# Patient Record
Sex: MALE | Race: WHITE | NOT HISPANIC OR LATINO | Employment: OTHER | URBAN - NONMETROPOLITAN AREA
[De-identification: names, ages, dates, MRNs, and addresses within clinical notes are randomized per-mention and may not be internally consistent; named-entity substitution may affect disease eponyms.]

---

## 2017-01-31 RX ORDER — SPIRONOLACTONE 50 MG/1
TABLET, FILM COATED ORAL
Qty: 30 TABLET | Refills: 0 | Status: SHIPPED | OUTPATIENT
Start: 2017-01-31

## 2017-02-02 ENCOUNTER — APPOINTMENT (OUTPATIENT)
Dept: GENERAL RADIOLOGY | Facility: HOSPITAL | Age: 52
End: 2017-02-02

## 2017-02-02 ENCOUNTER — APPOINTMENT (OUTPATIENT)
Dept: ULTRASOUND IMAGING | Facility: HOSPITAL | Age: 52
End: 2017-02-02

## 2017-02-02 ENCOUNTER — HOSPITAL ENCOUNTER (INPATIENT)
Facility: HOSPITAL | Age: 52
LOS: 5 days | Discharge: HOME | End: 2017-02-07
Attending: INTERNAL MEDICINE | Admitting: FAMILY MEDICINE

## 2017-02-02 DIAGNOSIS — I50.20 SYSTOLIC CONGESTIVE HEART FAILURE, UNSPECIFIED CONGESTIVE HEART FAILURE CHRONICITY: Primary | ICD-10-CM

## 2017-02-02 DIAGNOSIS — Z78.9 DECREASED ACTIVITIES OF DAILY LIVING (ADL): ICD-10-CM

## 2017-02-02 DIAGNOSIS — R68.89 DECREASED ACTIVITY TOLERANCE: ICD-10-CM

## 2017-02-02 LAB
ALBUMIN SERPL-MCNC: 3.6 G/DL (ref 3.5–5)
ALBUMIN/GLOB SERPL: 1.3 G/DL (ref 1.1–2.5)
ALP SERPL-CCNC: 59 U/L (ref 24–120)
ALT SERPL W P-5'-P-CCNC: 40 U/L (ref 0–54)
ANION GAP SERPL CALCULATED.3IONS-SCNC: 6 MMOL/L (ref 4–13)
APTT PPP: 42.6 SECONDS (ref 24.1–34.8)
AST SERPL-CCNC: 15 U/L (ref 7–45)
BACTERIA UR QL AUTO: ABNORMAL /HPF
BASOPHILS # BLD AUTO: 0.02 10*3/MM3 (ref 0–0.2)
BASOPHILS NFR BLD AUTO: 0.3 % (ref 0–2)
BILIRUB SERPL-MCNC: 1 MG/DL (ref 0.1–1)
BILIRUB UR QL STRIP: NEGATIVE
BUN BLD-MCNC: 26 MG/DL (ref 5–21)
BUN/CREAT SERPL: 21.8 (ref 7–25)
CALCIUM SPEC-SCNC: 8.7 MG/DL (ref 8.4–10.4)
CHLORIDE SERPL-SCNC: 98 MMOL/L (ref 98–110)
CLARITY UR: ABNORMAL
CO2 SERPL-SCNC: 30 MMOL/L (ref 24–31)
COLOR UR: YELLOW
CREAT BLD-MCNC: 1.19 MG/DL (ref 0.5–1.4)
CRP SERPL-MCNC: <0.5 MG/DL (ref 0–0.99)
D DIMER PPP FEU-MCNC: 0.45 MG/L (FEU) (ref 0–0.5)
DEPRECATED RDW RBC AUTO: 64 FL (ref 40–54)
DIGOXIN SERPL-MCNC: 1.4 NG/ML (ref 0.8–2)
EOSINOPHIL # BLD AUTO: 0.07 10*3/MM3 (ref 0–0.7)
EOSINOPHIL NFR BLD AUTO: 1.1 % (ref 0–4)
ERYTHROCYTE [DISTWIDTH] IN BLOOD BY AUTOMATED COUNT: 16.8 % (ref 12–15)
GFR SERPL CREATININE-BSD FRML MDRD: 64 ML/MIN/1.73
GLOBULIN UR ELPH-MCNC: 2.7 GM/DL
GLUCOSE BLD-MCNC: 150 MG/DL (ref 70–100)
GLUCOSE UR STRIP-MCNC: NEGATIVE MG/DL
HCT VFR BLD AUTO: 52.3 % (ref 40–52)
HGB BLD-MCNC: 16.6 G/DL (ref 14–18)
HGB UR QL STRIP.AUTO: ABNORMAL
HYALINE CASTS UR QL AUTO: ABNORMAL /LPF
IMM GRANULOCYTES # BLD: 0.03 10*3/MM3 (ref 0–0.03)
IMM GRANULOCYTES NFR BLD: 0.5 % (ref 0–5)
INR PPP: 2.34 (ref 0.91–1.09)
KETONES UR QL STRIP: NEGATIVE
LEUKOCYTE ESTERASE UR QL STRIP.AUTO: ABNORMAL
LYMPHOCYTES # BLD AUTO: 1.38 10*3/MM3 (ref 0.72–4.86)
LYMPHOCYTES NFR BLD AUTO: 22.5 % (ref 15–45)
MCH RBC QN AUTO: 33.3 PG (ref 28–32)
MCHC RBC AUTO-ENTMCNC: 31.7 G/DL (ref 33–36)
MCV RBC AUTO: 104.8 FL (ref 82–95)
MONOCYTES # BLD AUTO: 0.65 10*3/MM3 (ref 0.19–1.3)
MONOCYTES NFR BLD AUTO: 10.6 % (ref 4–12)
NEUTROPHILS # BLD AUTO: 3.98 10*3/MM3 (ref 1.87–8.4)
NEUTROPHILS NFR BLD AUTO: 65 % (ref 39–78)
NITRITE UR QL STRIP: NEGATIVE
NT-PROBNP SERPL-MCNC: 5350 PG/ML (ref 0–900)
PH UR STRIP.AUTO: 5.5 [PH] (ref 5–8)
PLATELET # BLD AUTO: 120 10*3/MM3 (ref 130–400)
PMV BLD AUTO: 12.9 FL (ref 6–12)
POTASSIUM BLD-SCNC: 3.9 MMOL/L (ref 3.5–5.3)
PROT SERPL-MCNC: 6.3 G/DL (ref 6.3–8.7)
PROT UR QL STRIP: ABNORMAL
PROTHROMBIN TIME: 26.5 SECONDS (ref 11.9–14.6)
RBC # BLD AUTO: 4.99 10*6/MM3 (ref 4.8–5.9)
RBC # UR: ABNORMAL /HPF
REF LAB TEST METHOD: ABNORMAL
SODIUM BLD-SCNC: 134 MMOL/L (ref 135–145)
SP GR UR STRIP: 1.01 (ref 1–1.03)
SQUAMOUS #/AREA URNS HPF: ABNORMAL /HPF
TROPONIN I SERPL-MCNC: 0 NG/ML (ref 0–0.07)
UROBILINOGEN UR QL STRIP: ABNORMAL
WBC NRBC COR # BLD: 6.13 10*3/MM3 (ref 4.8–10.8)
WBC UR QL AUTO: ABNORMAL /HPF

## 2017-02-02 PROCEDURE — 80162 ASSAY OF DIGOXIN TOTAL: CPT | Performed by: NURSE PRACTITIONER

## 2017-02-02 PROCEDURE — 25010000002 CEFTRIAXONE: Performed by: NURSE PRACTITIONER

## 2017-02-02 PROCEDURE — 87086 URINE CULTURE/COLONY COUNT: CPT | Performed by: NURSE PRACTITIONER

## 2017-02-02 PROCEDURE — 25010000002 FUROSEMIDE PER 20 MG: Performed by: NURSE PRACTITIONER

## 2017-02-02 PROCEDURE — 93970 EXTREMITY STUDY: CPT

## 2017-02-02 PROCEDURE — 93970 EXTREMITY STUDY: CPT | Performed by: SURGERY

## 2017-02-02 PROCEDURE — 85610 PROTHROMBIN TIME: CPT | Performed by: NURSE PRACTITIONER

## 2017-02-02 PROCEDURE — 93010 ELECTROCARDIOGRAM REPORT: CPT | Performed by: INTERNAL MEDICINE

## 2017-02-02 PROCEDURE — 85025 COMPLETE CBC W/AUTO DIFF WBC: CPT | Performed by: NURSE PRACTITIONER

## 2017-02-02 PROCEDURE — 87040 BLOOD CULTURE FOR BACTERIA: CPT | Performed by: NURSE PRACTITIONER

## 2017-02-02 PROCEDURE — 71020 HC CHEST PA AND LATERAL: CPT

## 2017-02-02 PROCEDURE — 85379 FIBRIN DEGRADATION QUANT: CPT | Performed by: NURSE PRACTITIONER

## 2017-02-02 PROCEDURE — 86140 C-REACTIVE PROTEIN: CPT | Performed by: NURSE PRACTITIONER

## 2017-02-02 PROCEDURE — 99285 EMERGENCY DEPT VISIT HI MDM: CPT

## 2017-02-02 PROCEDURE — 80053 COMPREHEN METABOLIC PANEL: CPT | Performed by: NURSE PRACTITIONER

## 2017-02-02 PROCEDURE — 85730 THROMBOPLASTIN TIME PARTIAL: CPT | Performed by: NURSE PRACTITIONER

## 2017-02-02 PROCEDURE — 84484 ASSAY OF TROPONIN QUANT: CPT

## 2017-02-02 PROCEDURE — 93005 ELECTROCARDIOGRAM TRACING: CPT | Performed by: NURSE PRACTITIONER

## 2017-02-02 PROCEDURE — 94799 UNLISTED PULMONARY SVC/PX: CPT

## 2017-02-02 PROCEDURE — 81001 URINALYSIS AUTO W/SCOPE: CPT | Performed by: NURSE PRACTITIONER

## 2017-02-02 PROCEDURE — 83880 ASSAY OF NATRIURETIC PEPTIDE: CPT | Performed by: NURSE PRACTITIONER

## 2017-02-02 RX ORDER — SPIRONOLACTONE 50 MG/1
50 TABLET, FILM COATED ORAL DAILY
Status: DISCONTINUED | OUTPATIENT
Start: 2017-02-03 | End: 2017-02-07 | Stop reason: HOSPADM

## 2017-02-02 RX ORDER — SODIUM CHLORIDE 0.9 % (FLUSH) 0.9 %
10 SYRINGE (ML) INJECTION AS NEEDED
Status: DISCONTINUED | OUTPATIENT
Start: 2017-02-02 | End: 2017-02-07 | Stop reason: HOSPADM

## 2017-02-02 RX ORDER — ACETAZOLAMIDE 500 MG/1
500 CAPSULE, EXTENDED RELEASE ORAL 2 TIMES DAILY
Status: DISCONTINUED | OUTPATIENT
Start: 2017-02-02 | End: 2017-02-03

## 2017-02-02 RX ORDER — ONDANSETRON 2 MG/ML
4 INJECTION INTRAMUSCULAR; INTRAVENOUS EVERY 6 HOURS PRN
Status: DISCONTINUED | OUTPATIENT
Start: 2017-02-02 | End: 2017-02-07 | Stop reason: HOSPADM

## 2017-02-02 RX ORDER — BUDESONIDE AND FORMOTEROL FUMARATE DIHYDRATE 80; 4.5 UG/1; UG/1
2 AEROSOL RESPIRATORY (INHALATION)
Status: DISCONTINUED | OUTPATIENT
Start: 2017-02-02 | End: 2017-02-03

## 2017-02-02 RX ORDER — FUROSEMIDE 10 MG/ML
40 INJECTION INTRAMUSCULAR; INTRAVENOUS ONCE
Status: COMPLETED | OUTPATIENT
Start: 2017-02-02 | End: 2017-02-02

## 2017-02-02 RX ORDER — COLCHICINE 0.6 MG/1
0.6 TABLET ORAL DAILY
Status: DISCONTINUED | OUTPATIENT
Start: 2017-02-03 | End: 2017-02-07 | Stop reason: HOSPADM

## 2017-02-02 RX ORDER — SODIUM CHLORIDE 0.9 % (FLUSH) 0.9 %
1-10 SYRINGE (ML) INJECTION AS NEEDED
Status: DISCONTINUED | OUTPATIENT
Start: 2017-02-02 | End: 2017-02-07 | Stop reason: HOSPADM

## 2017-02-02 RX ORDER — DIGOXIN 125 MCG
125 TABLET ORAL
Status: DISCONTINUED | OUTPATIENT
Start: 2017-02-03 | End: 2017-02-03

## 2017-02-02 RX ORDER — FUROSEMIDE 10 MG/ML
40 INJECTION INTRAMUSCULAR; INTRAVENOUS 2 TIMES DAILY
Status: DISCONTINUED | OUTPATIENT
Start: 2017-02-02 | End: 2017-02-03

## 2017-02-02 RX ORDER — ACETAMINOPHEN 325 MG/1
650 TABLET ORAL EVERY 4 HOURS PRN
Status: DISCONTINUED | OUTPATIENT
Start: 2017-02-02 | End: 2017-02-07 | Stop reason: HOSPADM

## 2017-02-02 RX ADMIN — CEFTRIAXONE 1 G: 1 INJECTION, POWDER, FOR SOLUTION INTRAMUSCULAR; INTRAVENOUS at 18:42

## 2017-02-02 RX ADMIN — FUROSEMIDE 40 MG: 10 INJECTION, SOLUTION INTRAMUSCULAR; INTRAVENOUS at 18:21

## 2017-02-02 NOTE — ED PROVIDER NOTES
Subjective   HPI Comments: Is a 28-year-old white male who presents here today with complaint of cough and shortness of breath and lower extremity edema.  Patient states this is been occurring since Christmas.  He reports he has not followed up with primary care provider that he is currently in between doctors.  Patient does have a history of chronic atrial fibrillation for which he does follow with Dr. Box.  Patient states that he does take 20 mg of Lasix daily and reports that he has been persistently taking this as well as Aldactone.  Patient reports that he does monitor salt intake in his diet. He reports that since Christmas he has had difficulty with walking short distances from the bathroom to the living room.  He reports that he become short of breath when he takes office closed.  Patient reports he has had about a 5 pound weight gain since Christmas.  He states he does have a cough with thick white phlegm.  Patient presents ER today for further evaluation.  She denies fever or chest pain at this time.    Patient is a 51 y.o. male presenting with shortness of breath.   History provided by:  Patient   used: No    Shortness of Breath   Severity:  Mild  Onset quality:  Gradual  Duration:  6 weeks  Timing:  Constant  Progression:  Worsening  Chronicity:  New  Context: activity    Context: not animal exposure, not emotional upset, not fumes, not known allergens, not occupational exposure, not pollens, not smoke exposure, not strong odors, not URI and not weather changes    Relieved by:  Nothing  Worsened by:  Nothing  Ineffective treatments:  None tried  Associated symptoms: sputum production    Associated symptoms: no abdominal pain, no chest pain, no claudication, no cough, no diaphoresis, no ear pain, no fever, no headaches, no hemoptysis, no neck pain, no PND, no rash, no sore throat, no syncope, no swollen glands, no vomiting and no wheezing    Risk factors: obesity    Risk factors: no  recent alcohol use, no family hx of DVT, no hx of cancer, no hx of PE/DVT, no oral contraceptive use, no prolonged immobilization, no recent surgery and no tobacco use        Review of Systems   Constitutional: Negative for diaphoresis and fever.   HENT: Negative for ear pain and sore throat.    Respiratory: Positive for sputum production and shortness of breath. Negative for cough, hemoptysis and wheezing.    Cardiovascular: Negative for chest pain, claudication, syncope and PND.   Gastrointestinal: Negative for abdominal pain and vomiting.   Musculoskeletal: Negative for neck pain.   Skin: Negative for rash.   Neurological: Negative for headaches.   All other systems reviewed and are negative.      Past Medical History   Diagnosis Date   • Cardiomegaly    • CHF (NYHA class I, ACC/AHA stage B)    • DVT (deep venous thrombosis)    • S/P cardiac cath    • Severe left ventricular systolic dysfunction    • Sleep apnea        No Known Allergies    History reviewed. No pertinent past surgical history.    Family History   Problem Relation Age of Onset   • Heart disease Mother    • Heart disease Father    • Heart disease Maternal Grandmother    • Heart disease Maternal Grandfather    • Heart disease Paternal Grandmother    • Heart disease Paternal Grandfather        Social History     Social History   • Marital status: Single     Spouse name: N/A   • Number of children: N/A   • Years of education: N/A     Social History Main Topics   • Smoking status: Current Every Day Smoker     Packs/day: 0.50   • Smokeless tobacco: None   • Alcohol use No   • Drug use: No   • Sexual activity: Defer     Other Topics Concern   • None     Social History Narrative           Objective   Physical Exam   Constitutional: He is oriented to person, place, and time. He appears well-developed and well-nourished.   HENT:   Head: Normocephalic and atraumatic.   Eyes: Conjunctivae are normal. Pupils are equal, round, and reactive to light.   Neck:  Normal range of motion. Neck supple.   Cardiovascular: Normal rate, regular rhythm and normal heart sounds.    Pulmonary/Chest: Effort normal and breath sounds normal.   Abdominal: Soft. Bowel sounds are normal.   Musculoskeletal: Normal range of motion.        Legs:  Neurological: He is alert and oriented to person, place, and time.   Skin: Skin is warm and dry.   Psychiatric: He has a normal mood and affect.   Nursing note and vitals reviewed.      Procedures         ED Course  ED Course   Value Comment By Time    CXR:   Impression: Findings concerning for pulmonary edema. Alternatively,  viral etiologies could have this appearance. Correlate clinically.     Kim MICHELLE Evon, APRN 02/02 1509    BLE venous doppler: no DVT visualized Kim M Evon APRN 02/02 1509   Specific Gravity, UA: 1.008 (Reviewed) Kim Barnard, APRN 02/02 1724   Leuk Esterase, UA: (!) Moderate (2+) (Reviewed) Kim Barnard, APRN 02/02 1724   Blood, UA: (!) Large (3+) (Reviewed) Kim Barnard, APRN 02/02 1724    Labs at this time.  Patient urinalysis shows a large amount of blood, trace amount of protein, moderate amount leuk esterase, gently to count red blood cells, 16 WBC.  His INR was elevated at 2.3.  PT 26.5 and PTT 2.6.  His BNP is 5350.  Kim MICHELLE Evon, APRN 02/02 1753    Patient did have 2 sets of blood cultures drawn while the ER today.  He also received 1 g of Rocephin IV for his urinary tract infection.  His bilateral venous Doppler ultrasound was negative.  Chest x-ray showed findings concerning for pulmonary edema..  He stated patient's heart rate has been anywhere from 55 on the way down to 33 which was reportedly per nursing staff.  A repeat EKG was obtained after the original hCG which did show an atrial fibrillation with a slow ventricular response at 57 bpm.  I did go and speak to the patient and reevaluate him his heart rate was 38 and then immediately went back up into the 50s.  At this time patient  does not wish to be discharged home.  He states that he feels like he has too much fluid on board of any severe admitted to be evaluated further.  This is been discussed with Dr. Carcamo at this time is evaluating the patient as well. Kim Barnard, APRN 02/02 7503                  [facility]    Final diagnoses:   Systolic congestive heart failure, unspecified congestive heart failure chronicity            Arnulfo Carcamo Jr., MD  02/02/17 1011

## 2017-02-03 ENCOUNTER — APPOINTMENT (OUTPATIENT)
Dept: CT IMAGING | Facility: HOSPITAL | Age: 52
End: 2017-02-03

## 2017-02-03 ENCOUNTER — APPOINTMENT (OUTPATIENT)
Dept: CARDIOLOGY | Facility: HOSPITAL | Age: 52
End: 2017-02-03
Attending: INTERNAL MEDICINE

## 2017-02-03 PROBLEM — E66.01 MORBID OBESITY (HCC): Status: ACTIVE | Noted: 2017-02-03

## 2017-02-03 PROBLEM — E11.9 NEW ONSET TYPE 2 DIABETES MELLITUS (HCC): Status: ACTIVE | Noted: 2017-02-03

## 2017-02-03 PROBLEM — Z79.01 CHRONIC ANTICOAGULATION: Status: ACTIVE | Noted: 2017-02-03

## 2017-02-03 PROBLEM — F10.11 HISTORY OF ALCOHOL ABUSE: Status: ACTIVE | Noted: 2017-02-03

## 2017-02-03 PROBLEM — R00.1 BRADYCARDIA: Status: ACTIVE | Noted: 2017-02-03

## 2017-02-03 PROBLEM — I42.8 NONISCHEMIC CARDIOMYOPATHY (HCC): Status: ACTIVE | Noted: 2017-02-03

## 2017-02-03 PROBLEM — D53.1 MEGALOBLASTIC ANEMIA: Status: ACTIVE | Noted: 2017-02-03

## 2017-02-03 PROBLEM — R31.29 MICROSCOPIC HEMATURIA: Status: ACTIVE | Noted: 2017-02-03

## 2017-02-03 PROBLEM — G47.33 OBSTRUCTIVE SLEEP APNEA SYNDROME: Status: ACTIVE | Noted: 2017-02-03

## 2017-02-03 PROBLEM — I48.0 PAROXYSMAL ATRIAL FIBRILLATION (HCC): Status: ACTIVE | Noted: 2017-02-03

## 2017-02-03 PROBLEM — E78.5 DYSLIPIDEMIA: Status: ACTIVE | Noted: 2017-02-03

## 2017-02-03 PROBLEM — I50.43 ACUTE ON CHRONIC COMBINED SYSTOLIC AND DIASTOLIC CONGESTIVE HEART FAILURE (HCC): Status: ACTIVE | Noted: 2017-02-02

## 2017-02-03 PROBLEM — Z72.0 TOBACCO ABUSE: Status: ACTIVE | Noted: 2017-02-03

## 2017-02-03 LAB
ANION GAP SERPL CALCULATED.3IONS-SCNC: 7 MMOL/L (ref 4–13)
ARTICHOKE IGE QN: 81 MG/DL (ref 0–99)
BH CV ECHO MEAS - AI DEC SLOPE: 116 CM/SEC^2
BH CV ECHO MEAS - AI MAX PG: 7.7 MMHG
BH CV ECHO MEAS - AI MAX VEL: 139 CM/SEC
BH CV ECHO MEAS - AI P1/2T: 351 MSEC
BH CV ECHO MEAS - AO MAX PG (FULL): 8.9 MMHG
BH CV ECHO MEAS - AO MAX PG: 12.3 MMHG
BH CV ECHO MEAS - AO MEAN PG (FULL): 4 MMHG
BH CV ECHO MEAS - AO MEAN PG: 6 MMHG
BH CV ECHO MEAS - AO ROOT AREA (BSA CORRECTED): 1.4
BH CV ECHO MEAS - AO ROOT AREA: 11.3 CM^2
BH CV ECHO MEAS - AO ROOT DIAM: 3.8 CM
BH CV ECHO MEAS - AO V2 MAX: 175 CM/SEC
BH CV ECHO MEAS - AO V2 MEAN: 118 CM/SEC
BH CV ECHO MEAS - AO V2 VTI: 32.2 CM
BH CV ECHO MEAS - AVA(I,A): 2 CM^2
BH CV ECHO MEAS - AVA(I,D): 2 CM^2
BH CV ECHO MEAS - AVA(V,A): 2.2 CM^2
BH CV ECHO MEAS - AVA(V,D): 2.2 CM^2
BH CV ECHO MEAS - BSA(HAYCOCK): 3 M^2
BH CV ECHO MEAS - BSA: 2.8 M^2
BH CV ECHO MEAS - BZI_BMI: 53 KILOGRAMS/M^2
BH CV ECHO MEAS - BZI_METRIC_HEIGHT: 180.3 CM
BH CV ECHO MEAS - BZI_METRIC_WEIGHT: 172.4 KG
BH CV ECHO MEAS - CONTRAST EF 4CH: 43.6 ML/M^2
BH CV ECHO MEAS - EDV(CUBED): 117.6 ML
BH CV ECHO MEAS - EDV(MOD-SP4): 78.3 ML
BH CV ECHO MEAS - EDV(TEICH): 112.8 ML
BH CV ECHO MEAS - EF(CUBED): 62.3 %
BH CV ECHO MEAS - EF(MOD-SP4): 43.6 %
BH CV ECHO MEAS - EF(TEICH): 53.7 %
BH CV ECHO MEAS - ESV(CUBED): 44.4 ML
BH CV ECHO MEAS - ESV(MOD-SP4): 44.2 ML
BH CV ECHO MEAS - ESV(TEICH): 52.3 ML
BH CV ECHO MEAS - FS: 27.8 %
BH CV ECHO MEAS - IVS/LVPW: 0.89
BH CV ECHO MEAS - IVSD: 1.1 CM
BH CV ECHO MEAS - LA DIMENSION: 5.4 CM
BH CV ECHO MEAS - LA/AO: 1.4
BH CV ECHO MEAS - LV DIASTOLIC VOL/BSA (35-75): 28.3 ML/M^2
BH CV ECHO MEAS - LV MASS(C)D: 229 GRAMS
BH CV ECHO MEAS - LV MASS(C)DI: 82.7 GRAMS/M^2
BH CV ECHO MEAS - LV MAX PG: 3.4 MMHG
BH CV ECHO MEAS - LV MEAN PG: 2 MMHG
BH CV ECHO MEAS - LV SYSTOLIC VOL/BSA (12-30): 16 ML/M^2
BH CV ECHO MEAS - LV V1 MAX: 91.6 CM/SEC
BH CV ECHO MEAS - LV V1 MEAN: 64.3 CM/SEC
BH CV ECHO MEAS - LV V1 VTI: 15.8 CM
BH CV ECHO MEAS - LVIDD: 4.9 CM
BH CV ECHO MEAS - LVIDS: 3.5 CM
BH CV ECHO MEAS - LVLD AP4: 8.5 CM
BH CV ECHO MEAS - LVLS AP4: 8.6 CM
BH CV ECHO MEAS - LVOT AREA (M): 4.2 CM^2
BH CV ECHO MEAS - LVOT AREA: 4.2 CM^2
BH CV ECHO MEAS - LVOT DIAM: 2.3 CM
BH CV ECHO MEAS - LVPWD: 1.3 CM
BH CV ECHO MEAS - MR MAX PG: 85.7 MMHG
BH CV ECHO MEAS - MR MAX VEL: 463 CM/SEC
BH CV ECHO MEAS - MV DEC TIME: 0.22 SEC
BH CV ECHO MEAS - MV E MAX VEL: 110 CM/SEC
BH CV ECHO MEAS - RAP SYSTOLE: 5 MMHG
BH CV ECHO MEAS - RVSP: 49.1 MMHG
BH CV ECHO MEAS - SI(AO): 131.8 ML/M^2
BH CV ECHO MEAS - SI(CUBED): 26.5 ML/M^2
BH CV ECHO MEAS - SI(LVOT): 23.7 ML/M^2
BH CV ECHO MEAS - SI(MOD-SP4): 12.3 ML/M^2
BH CV ECHO MEAS - SI(TEICH): 21.9 ML/M^2
BH CV ECHO MEAS - SV(AO): 365.2 ML
BH CV ECHO MEAS - SV(CUBED): 73.3 ML
BH CV ECHO MEAS - SV(LVOT): 65.6 ML
BH CV ECHO MEAS - SV(MOD-SP4): 34.1 ML
BH CV ECHO MEAS - SV(TEICH): 60.5 ML
BH CV ECHO MEAS - TR MAX VEL: 332 CM/SEC
BUN BLD-MCNC: 27 MG/DL (ref 5–21)
BUN/CREAT SERPL: 22.1 (ref 7–25)
CALCIUM SPEC-SCNC: 8.9 MG/DL (ref 8.4–10.4)
CHLORIDE SERPL-SCNC: 99 MMOL/L (ref 98–110)
CHOLEST SERPL-MCNC: 110 MG/DL (ref 130–200)
CO2 SERPL-SCNC: 31 MMOL/L (ref 24–31)
CREAT BLD-MCNC: 1.22 MG/DL (ref 0.5–1.4)
DEPRECATED RDW RBC AUTO: 64.8 FL (ref 40–54)
ERYTHROCYTE [DISTWIDTH] IN BLOOD BY AUTOMATED COUNT: 17 % (ref 12–15)
GFR SERPL CREATININE-BSD FRML MDRD: 62 ML/MIN/1.73
GLUCOSE BLD-MCNC: 169 MG/DL (ref 70–100)
GLUCOSE BLDC GLUCOMTR-MCNC: 156 MG/DL (ref 70–130)
GLUCOSE BLDC GLUCOMTR-MCNC: 171 MG/DL (ref 70–130)
HBA1C MFR BLD: 8.8 %
HCT VFR BLD AUTO: 52.1 % (ref 40–52)
HDLC SERPL-MCNC: 22 MG/DL
HGB BLD-MCNC: 16.2 G/DL (ref 14–18)
LDLC/HDLC SERPL: 2.7 {RATIO}
LEFT ATRIUM VOLUME INDEX: 29.4 ML/M2
LV EF 2D ECHO EST: 55 %
MCH RBC QN AUTO: 32.9 PG (ref 28–32)
MCHC RBC AUTO-ENTMCNC: 31.1 G/DL (ref 33–36)
MCV RBC AUTO: 105.7 FL (ref 82–95)
PLATELET # BLD AUTO: 122 10*3/MM3 (ref 130–400)
PMV BLD AUTO: 13.1 FL (ref 6–12)
POTASSIUM BLD-SCNC: 3.9 MMOL/L (ref 3.5–5.3)
RBC # BLD AUTO: 4.93 10*6/MM3 (ref 4.8–5.9)
SODIUM BLD-SCNC: 137 MMOL/L (ref 135–145)
TRIGL SERPL-MCNC: 143 MG/DL (ref 0–149)
TSH SERPL DL<=0.05 MIU/L-ACNC: 3.25 MIU/ML (ref 0.47–4.68)
WBC NRBC COR # BLD: 5.85 10*3/MM3 (ref 4.8–10.8)

## 2017-02-03 PROCEDURE — 83036 HEMOGLOBIN GLYCOSYLATED A1C: CPT | Performed by: INTERNAL MEDICINE

## 2017-02-03 PROCEDURE — 82962 GLUCOSE BLOOD TEST: CPT

## 2017-02-03 PROCEDURE — 84443 ASSAY THYROID STIM HORMONE: CPT | Performed by: INTERNAL MEDICINE

## 2017-02-03 PROCEDURE — 63710000001 INSULIN LISPRO (HUMAN) PER 5 UNITS: Performed by: INTERNAL MEDICINE

## 2017-02-03 PROCEDURE — 99222 1ST HOSP IP/OBS MODERATE 55: CPT | Performed by: INTERNAL MEDICINE

## 2017-02-03 PROCEDURE — 80061 LIPID PANEL: CPT | Performed by: INTERNAL MEDICINE

## 2017-02-03 PROCEDURE — 85027 COMPLETE CBC AUTOMATED: CPT | Performed by: INTERNAL MEDICINE

## 2017-02-03 PROCEDURE — 93306 TTE W/DOPPLER COMPLETE: CPT | Performed by: INTERNAL MEDICINE

## 2017-02-03 PROCEDURE — 25010000002 FUROSEMIDE PER 20 MG: Performed by: INTERNAL MEDICINE

## 2017-02-03 PROCEDURE — 80048 BASIC METABOLIC PNL TOTAL CA: CPT | Performed by: INTERNAL MEDICINE

## 2017-02-03 PROCEDURE — 25010000002 PERFLUTREN (DEFINITY) 8.476 MG IN SODIUM CHLORIDE 10 ML INJECTION: Performed by: INTERNAL MEDICINE

## 2017-02-03 PROCEDURE — C8929 TTE W OR WO FOL WCON,DOPPLER: HCPCS

## 2017-02-03 PROCEDURE — 94640 AIRWAY INHALATION TREATMENT: CPT

## 2017-02-03 RX ORDER — ACETAZOLAMIDE 250 MG/1
250 TABLET ORAL 2 TIMES DAILY
Status: DISCONTINUED | OUTPATIENT
Start: 2017-02-03 | End: 2017-02-07 | Stop reason: HOSPADM

## 2017-02-03 RX ORDER — BUDESONIDE AND FORMOTEROL FUMARATE DIHYDRATE 160; 4.5 UG/1; UG/1
2 AEROSOL RESPIRATORY (INHALATION)
Status: DISCONTINUED | OUTPATIENT
Start: 2017-02-03 | End: 2017-02-07 | Stop reason: HOSPADM

## 2017-02-03 RX ORDER — LISINOPRIL 2.5 MG/1
2.5 TABLET ORAL
Status: DISCONTINUED | OUTPATIENT
Start: 2017-02-03 | End: 2017-02-05

## 2017-02-03 RX ORDER — NICOTINE POLACRILEX 4 MG
15 LOZENGE BUCCAL
Status: DISCONTINUED | OUTPATIENT
Start: 2017-02-03 | End: 2017-02-07 | Stop reason: HOSPADM

## 2017-02-03 RX ORDER — DEXTROSE MONOHYDRATE 25 G/50ML
25 INJECTION, SOLUTION INTRAVENOUS
Status: DISCONTINUED | OUTPATIENT
Start: 2017-02-03 | End: 2017-02-07 | Stop reason: HOSPADM

## 2017-02-03 RX ADMIN — FUROSEMIDE 40 MG: 10 INJECTION, SOLUTION INTRAMUSCULAR; INTRAVENOUS at 11:01

## 2017-02-03 RX ADMIN — BUMETANIDE 0.5 MG/HR: 0.25 INJECTION INTRAMUSCULAR; INTRAVENOUS at 17:31

## 2017-02-03 RX ADMIN — LISINOPRIL 2.5 MG: 2.5 TABLET ORAL at 17:31

## 2017-02-03 RX ADMIN — ACETAZOLAMIDE 500 MG: 500 CAPSULE, EXTENDED RELEASE ORAL at 11:00

## 2017-02-03 RX ADMIN — RIVAROXABAN 20 MG: 20 TABLET, FILM COATED ORAL at 11:00

## 2017-02-03 RX ADMIN — BUDESONIDE AND FORMOTEROL FUMARATE DIHYDRATE 2 PUFF: 80; 4.5 AEROSOL RESPIRATORY (INHALATION) at 11:59

## 2017-02-03 RX ADMIN — SODIUM CHLORIDE 1.5 ML: 9 INJECTION INTRAMUSCULAR; INTRAVENOUS; SUBCUTANEOUS at 09:45

## 2017-02-03 RX ADMIN — COLCHICINE 0.6 MG: 0.6 TABLET, FILM COATED ORAL at 11:00

## 2017-02-03 RX ADMIN — INSULIN LISPRO 2 UNITS: 100 INJECTION, SOLUTION INTRAVENOUS; SUBCUTANEOUS at 17:31

## 2017-02-03 RX ADMIN — TIOTROPIUM BROMIDE 1 CAPSULE: 18 CAPSULE ORAL; RESPIRATORY (INHALATION) at 12:00

## 2017-02-03 RX ADMIN — ACETAZOLAMIDE 250 MG: 250 TABLET ORAL at 17:32

## 2017-02-03 RX ADMIN — SPIRONOLACTONE 50 MG: 50 TABLET, FILM COATED ORAL at 11:00

## 2017-02-03 RX ADMIN — BUDESONIDE AND FORMOTEROL FUMARATE DIHYDRATE 2 PUFF: 160; 4.5 AEROSOL RESPIRATORY (INHALATION) at 20:51

## 2017-02-03 RX ADMIN — INSULIN LISPRO 2 UNITS: 100 INJECTION, SOLUTION INTRAVENOUS; SUBCUTANEOUS at 20:33

## 2017-02-03 NOTE — PROGRESS NOTES
[facility] Medicine Services  INPATIENT PROGRESS NOTE    Length of Stay: 1  Date of Admission: 2/2/2017  Primary Care Physician: No Known Provider (was AMTEO Mir)    Subjective   Chief Complaint: shortness of breath  HPI   He does not feel that he is getting very much fluid off.  Still very short of breath activity.  Still complains of significant lower extremity edema.    No complaints of chest discomfort.    He does not think that he can lie flat for a CT scan of his belly today.    Review of Systems     All pertinent negatives and positives are as above. All other systems have been reviewed and are negative unless otherwise stated.     Objective    Temp:  [97.6 °F (36.4 °C)-98.2 °F (36.8 °C)] 98 °F (36.7 °C)  Heart Rate:  [43-66] 63  Resp:  [18-21] 21  BP: (101-135)/(46-66) 135/57  Physical Exam  Constitutional: He is oriented to person, place, and time. He appears well-developed and well-nourished.   Head: Normocephalic and atraumatic.   Eyes: Conjunctivae and EOM are normal. Pupils are equal, round, and reactive to light.   Neck: Neck supple. No JVD present. No thyromegaly present.   Cardiovascular: Normal rate, regular rhythm, normal heart sounds and intact distal pulses. Exam reveals no gallop and no friction rub.   No murmur heard.  Pulmonary/Chest: Effort normal. No respiratory distress. He has no wheezes. He has rales. He exhibits no tenderness.   Abdominal: Soft. Bowel sounds are normal. He exhibits distension. There is no tenderness. There is no rebound and no guarding.   Musculoskeletal: Normal range of motion. He exhibits edema (1+ bilateral lower extremities with chronic stasis dermatitis and dry skin). He exhibits no tenderness or deformity.   Neurological: He is alert and oriented to person, place, and time. He displays normal reflexes. No cranial nerve deficit. He exhibits normal muscle tone.   Skin: Skin is warm and dry. No rash noted.   Bilateral  lower extremity stasis dermatitis and very dry skin. On a mycosis of the nails.   Psychiatric: He has a normal mood and affect. His behavior is normal. Judgment and thought content normal.     Intake/Output Summary (Last 24 hours) at 02/03/17 1401  Last data filed at 02/03/17 1303   Gross per 24 hour   Intake   2080 ml   Output    950 ml   Net   1130 ml     Results Review:  I have reviewed the labs, radiology results, and diagnostic studies.    Laboratory Data:     Results from last 7 days  Lab Units 02/03/17  0425 02/02/17  1347   WBC 10*3/mm3 5.85 6.13   HEMOGLOBIN g/dL 16.2 16.6   HEMATOCRIT % 52.1* 52.3*   PLATELETS 10*3/mm3 122* 120*       Results from last 7 days  Lab Units 02/03/17  0425 02/02/17  1347   SODIUM mmol/L 137 134*   POTASSIUM mmol/L 3.9 3.9   CHLORIDE mmol/L 99 98   TOTAL CO2 mmol/L 31.0 30.0   BUN mg/dL 27* 26*   CREATININE mg/dL 1.22 1.19   CALCIUM mg/dL 8.9 8.7   BILIRUBIN mg/dL  --  1.0   ALK PHOS U/L  --  59   ALT (SGPT) U/L  --  40   AST (SGOT) U/L  --  15   GLUCOSE mg/dL 169* 150*     Culture Data:   BLOOD CULTURE   Date Value Ref Range Status   02/02/2017 No growth at less than 24 hours  Preliminary   02/02/2017 No growth at less than 24 hours  Preliminary     URINE CULTURE   Date Value Ref Range Status   02/02/2017 10,000-20,000 CFU/mL Mixed Gram Positive Rossy (A)  Preliminary     Radiology Data:   Imaging Results (last 24 hours)     Procedure Component Value Units Date/Time    XR Chest 2 View [[id number]] Collected:  02/02/17 1441     Updated:  02/02/17 1446    Narrative:       EXAMINATION: XR CHEST 2 VW- 2/2/2017 14:41 CST     HISTORY: Shortness of breath     Comparison: 07/10/2016     Findings:  Heart is magnified but felt to be mildly enlarged. There is diffuse  interstitial prominence and indistinctness of the pulmonary vasculature,  concerning for pulmonary edema. No pleural effusion or pneumothorax.  Lungs appear hyperinflated.       Impression:       Impression: Findings  concerning for pulmonary edema. Alternatively,  viral etiologies could have this appearance. Correlate clinically.  This report was finalized on 02/02/2017 14:44 by Dr. Art Vasquez MD.        Hemoglobin A1c is 8.8.  TSH is 3.250.  Lipid panel shows a total cholesterol 110, HDL 22, LDL 81, and triglycerides 103.    I have reviewed the patient current medications.     Assessment/Plan   Assessment:   1. Acute on chronic diastolic heart failure (last ejection fraction recorded was 60% in 2014).  2. Dyspnea.  3. Lower extremity edema.  4. Paroxysmal atrial fibrillation on chronic anticoagulation with Xarelto.   5. Bradycardia, asymptomatic at present.  6. Relative hypotension.  7. Microscopic hematuria.  8. 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 Ongoing tobacco abuse with underlying COPD.  10. Megaloblastosis.  11.  Type II diabetes mellitus with a hemoglobin A1c of 8.8, new diagnosis.    Plan:   Echocardiogram has been done, but is pending official read.    Disontinue IV Lasix and place on a Bumex drip. Continue Aldactone.    Carvedilol and digoxin are on hold secondary to bradycardia and pauses.    I am going to start him back on a low-dose of lisinopril.    Continue on Xarelto.    He has been seen by Madelyn Bianchi from cardiology and we are awaiting an opinion from Dr. Ca.    Diabetic educator.  Nutrition consult.  I will wait to start him on oral diabetic medications until I can see what his ejection fraction is his kidney function will perform on diuretics.    I need to set him up for a noncontrasted CT scan of the abdomen and pelvis to assess hematuria, microscopic prior to discharge.    Discharge Planning: I expect patient to be discharged to home in 2-3 days.    Will Galo,    02/03/17   1:59 PM

## 2017-02-03 NOTE — PLAN OF CARE
Problem: Patient Care Overview (Adult)  Goal: Plan of Care Review  Outcome: Ongoing (interventions implemented as appropriate)    02/03/17 0301   Coping/Psychosocial Response Interventions   Plan Of Care Reviewed With patient   Patient Care Overview   Progress improving   Outcome Evaluation   Outcome Summary/Follow up Plan no complaints of SOA or pain, frequent pauses greatest at 3.27, 3+ pitting edema to bilateral lower extremities       Goal: Adult Individualization and Mutuality  Outcome: Ongoing (interventions implemented as appropriate)    Problem: Cardiac: Heart Failure (Adult)  Goal: Signs and Symptoms of Listed Potential Problems Will be Absent or Manageable (Cardiac: Heart Failure)  Outcome: Ongoing (interventions implemented as appropriate)    Problem: Arrhythmia/Dysrhythmia (Symptomatic) (Adult)  Goal: Signs and Symptoms of Listed Potential Problems Will be Absent or Manageable (Arrhythmia/Dysrhythmia)  Outcome: Ongoing (interventions implemented as appropriate)

## 2017-02-03 NOTE — PLAN OF CARE
Problem: Patient Care Overview (Adult)  Goal: Plan of Care Review  Outcome: Ongoing (interventions implemented as appropriate)    02/03/17 0397   Coping/Psychosocial Response Interventions   Plan Of Care Reviewed With patient   Patient Care Overview   Progress no change   Outcome Evaluation   Outcome Summary/Follow up Plan Initial RD assessment; appetite good. Pt with demonstrated knowledge deficit of Heart Healthy/ADA diet. Presented recommendations to Pt and encouraged over-time small, goal-based modificiations to diet. Provided RD contact info if further questions arise and Will continue to FU

## 2017-02-03 NOTE — PROGRESS NOTES
Discharge Planning Assessment  [facility]     Patient Name: Donte Bishop  MRN: [id number]  Today's Date: 2/3/2017    Admit Date: 2/2/2017          Discharge Needs Assessment       02/03/17 0931    Living Environment    Lives With parent(s)    Living Arrangements house    Quality Of Family Relationships supportive    Able to Return to Prior Living Arrangements yes    Discharge Needs Assessment    Concerns To Be Addressed no discharge needs identified;denies needs/concerns at this time    Anticipated Changes Related to Illness none    Equipment Currently Used at Home respiratory supplies    Equipment Needed After Discharge none    Transportation Available family or friend will provide            Discharge Plan       02/03/17 0935    Case Management/Social Work Plan    Plan PT resides at home with family and plans to dc to the same with no known needs at this time. Will follow.     Patient/Family In Agreement With Plan yes        Discharge Placement     No information found                Demographic Summary     None            Functional Status     None            Psychosocial     None            Abuse/Neglect     None            Legal     None            Substance Abuse     None            Patient Forms     None          SANDI Wood

## 2017-02-03 NOTE — PAYOR COMM NOTE
"[facility]  GWENDOLYN MCINTOSH RN [phone]  FAX [phone]    Shirley Bishop (51 y.o. Male)     Date of Birth Social Security Number Address Home Phone MRN    1965  [address]   [facility] [phone] [id number]    Latter-day Marital Status          [facility] Single       Admission Date Admission Type Admitting Provider Attending Provider Department, Room/Bed    2/2/17 Emergency iWll Galo DO Hancock, John C, DO [facility] 4B, 408/1    Discharge Date Discharge Disposition Discharge Destination                      Attending Provider: Will Galo DO     Allergies:  No Known Allergies    Isolation:  None   Infection:  None   Code Status:  FULL    Ht:  71\" (180.3 cm)   Wt:  380 lb (172 kg)    Admission Cmt:  None   Principal Problem:  Acute on chronic combined systolic and diastolic congestive heart failure [I50.43]                 Active Insurance as of 2/2/2017     Primary Coverage     Payor Plan Insurance Group Employer/Plan Group    ANTHEM MEDICARE REPLACEMENT ANTHEM MEDICARE ADVANTAGE [id number]     Payor Plan Address Payor Plan Phone Number Effective From Effective To    [address] [phone] 1/1/2016     [place] [address]       Subscriber Name Subscriber Birth Date Member ID       SHIRLEY BISHOP 1965 [id number]                 Emergency Contacts      (Rel.) Home Phone Work Phone Mobile Phone    Anahy Bishop (Mother) [phone] -- --               History & Physical      Will Galo DO at 2/2/2017  6:41 PM              [facility] Medicine Services  HISTORY AND PHYSICAL    Date of Admission: 2/2/2017  Primary Care Physician: He was seen in [facility] at [facility].  He is looking for a new provider.    Subjective     Chief Complaint: shortness of breath and lower extremity edema    History of Present Illness  Mr. Bishop is a 51-year-old  gentleman who resides in [facility]" [place].  He has been seen Juliette Chun for primary care.  However, she has recently left her practice and he is in search of new primary care. He has followed with Dr. Ca from cardiology in the past.  He previously had a history of a nonischemic cardiomyopathy and had an ejection fraction is low as 30% in 2014.  An echo was repeated later that year that showed his ejection fraction had increased to 60%.  However, he still had features of diastolic heart failure.    He says he slowly been retaining fluid since around Christmas.  He last saw Dr. Ca and Juliette Chun in September.  He says that he has been compliant with his medications.  He tells me that he has been drinking nothing but water and has not been eating any foods that contain salt.  Nevertheless, he continues to retain fluid.    He denies associated chest pain.  He has had acceleration of his lower extremity edema.  He complains of orthopnea paroxysmal nocturnal dyspnea.     He is found to have microscopic hematuria.  He is unaware of ever having this problem.  He denies oneyda blood in his urine.    He has a history of alcohol abuse.  He says he has not had any alcohol since Christmas.  He previously drank whiskey on a nearly daily basis.    Review of Systems   Constitutional: Positive for activity change, fatigue and unexpected weight change. Negative for chills, diaphoresis and fever.   Respiratory: Positive for shortness of breath. Negative for cough, chest tightness, wheezing and stridor.    Cardiovascular: Positive for leg swelling. Negative for chest pain and palpitations.   Gastrointestinal: Positive for abdominal distention. Negative for blood in stool, constipation, diarrhea, nausea and vomiting.   Genitourinary: Negative for difficulty urinating, dysuria, flank pain, frequency, hematuria, penile swelling and scrotal swelling.   Musculoskeletal: Negative for arthralgias and myalgias.   Neurological: Positive for weakness. Negative for  dizziness, syncope, light-headedness and numbness.      Otherwise complete ROS reviewed and negative except as mentioned in the HPI.    Past Medical History:   Past Medical History   Diagnosis Date   • Cardiomegaly    • CHF (NYHA class I, ACC/AHA stage B)    • DVT (deep venous thrombosis)    • S/P cardiac cath    • Severe left ventricular systolic dysfunction    • Sleep apnea      Past Surgical History:History reviewed. No pertinent past surgical history.    Social History:  reports that he has been smoking.  He has been smoking about 0.50 packs per day. He does not have any smokeless tobacco history on file. He reports that he does not drink alcohol or use illicit drugs.    Family History: family history includes Heart disease in his father, maternal grandfather, maternal grandmother, mother, paternal grandfather, and paternal grandmother.   His father had a nonischemic cardiomyopathy as well.    Allergies:  No Known Allergies    Medications:  Prior to Admission medications    Medication Sig Start Date End Date Taking? Authorizing Provider   acetaZOLAMIDE (DIAMOX) 500 MG capsule Take 500 mg by mouth 2 (two) times a day.    Historical Provider, MD   budesonide-formoterol (SYMBICORT) 80-4.5 MCG/ACT inhaler Inhale 2 puffs 2 (two) times a day.    Historical Provider, MD   carvedilol (COREG) 12.5 MG tablet Take 12.5 mg by mouth 2 (two) times a day with meals.    Historical Provider, MD   colchicine 0.6 MG tablet Take 0.6 mg by mouth daily.    Historical Provider, MD   digoxin (LANOXIN) 125 MCG tablet Take 125 mcg by mouth every day. DOSE UNKNOWN    Historical Provider, MD   furosemide (LASIX) 20 MG tablet Take 20 mg by mouth daily.    Historical Provider, MD   lisinopril (PRINIVIL,ZESTRIL) 5 MG tablet Take 5 mg by mouth daily.    Historical Provider, MD   pseudoephedrine-guaifenesin (MUCINEX D)  MG per 12 hr tablet Take 1 tablet by mouth every 12 (twelve) hours.    Historical Provider, MD   rivaroxaban (XARELTO)  "20 MG tablet Take 20 mg by mouth daily.    Historical Provider, MD   spironolactone (ALDACTONE) 50 MG tablet Take 50 mg by mouth daily.    Historical Provider, MD   tiotropium (SPIRIVA) 18 MCG per inhalation capsule Place 1 capsule into inhaler and inhale 1 (one) time daily.    Historical Provider, MD     Objective     Vital Signs:   Visit Vitals   • /46   • Pulse (!) 47   • Temp 97.4 °F (36.3 °C)   • Resp 18   • Ht 71\" (180.3 cm)   • Wt (!) 380 lb (172 kg)   • SpO2 93%   • BMI 53 kg/m2     Physical Exam   Constitutional: He is oriented to person, place, and time. He appears well-developed and well-nourished.   HENT:   Head: Normocephalic and atraumatic.   Eyes: Conjunctivae and EOM are normal. Pupils are equal, round, and reactive to light.   Neck: Neck supple. No JVD present. No thyromegaly present.   Cardiovascular: Normal rate, regular rhythm, normal heart sounds and intact distal pulses.  Exam reveals no gallop and no friction rub.    No murmur heard.  Pulmonary/Chest: Effort normal. No respiratory distress. He has no wheezes. He has rales. He exhibits no tenderness.   Abdominal: Soft. Bowel sounds are normal. He exhibits distension. There is no tenderness. There is no rebound and no guarding.   Musculoskeletal: Normal range of motion. He exhibits edema (1+ bilateral lower extremities with chronic stasis dermatitis and dry skin). He exhibits no tenderness or deformity.   Lymphadenopathy:     He has no cervical adenopathy.   Neurological: He is alert and oriented to person, place, and time. He displays normal reflexes. No cranial nerve deficit. He exhibits normal muscle tone.   Skin: Skin is warm and dry. No rash noted.   Bilateral lower extremity stasis dermatitis and very dry skin.  On a mycosis of the nails.   Psychiatric: He has a normal mood and affect. His behavior is normal. Judgment and thought content normal.     Results Reviewed:  Lab Results (last 24 hours)     Procedure Component Value Units " Date/Time    CBC & Differential [[id number]] Collected:  02/02/17 1347    Specimen:  Blood Updated:  02/02/17 1357    Narrative:       The following orders were created for panel order CBC & Differential.  Procedure                               Abnormality         Status                     ---------                               -----------         ------                     CBC Auto Differential[[id number]]         Abnormal            Final result                 Please view results for these tests on the individual orders.    CBC Auto Differential [[id number]]  (Abnormal) Collected:  02/02/17 1347    Specimen:  Blood Updated:  02/02/17 1357     WBC 6.13 10*3/mm3      RBC 4.99 10*6/mm3      Hemoglobin 16.6 g/dL      Hematocrit 52.3 (H) %      .8 (H) fL      MCH 33.3 (H) pg      MCHC 31.7 (L) g/dL      RDW 16.8 (H) %      RDW-SD 64.0 (H) fl      MPV 12.9 (H) fL      Platelets 120 (L) 10*3/mm3      Neutrophil % 65.0 %      Lymphocyte % 22.5 %      Monocyte % 10.6 %      Eosinophil % 1.1 %      Basophil % 0.3 %      Immature Grans % 0.5 %      Neutrophils, Absolute 3.98 10*3/mm3      Lymphocytes, Absolute 1.38 10*3/mm3      Monocytes, Absolute 0.65 10*3/mm3      Eosinophils, Absolute 0.07 10*3/mm3      Basophils, Absolute 0.02 10*3/mm3      Immature Grans, Absolute 0.03 10*3/mm3     POC Troponin, Rapid [[id number]]  (Normal) Collected:  02/02/17 1354    Specimen:  Blood Updated:  02/02/17 1411     Troponin I 0.00 ng/mL       Serial Number: [id number]    : [id number]       Protime-INR [[id number]]  (Abnormal) Collected:  02/02/17 1347    Specimen:  Blood Updated:  02/02/17 1411     Protime 26.5 (H) Seconds      INR 2.34 (H)     aPTT [[id number]]  (Abnormal) Collected:  02/02/17 1347    Specimen:  Blood Updated:  02/02/17 1411     PTT 42.6 (H) seconds     D-dimer, Quantitative [[id number]]  (Normal) Collected:  02/02/17 1347    Specimen:  Blood Updated:  02/02/17 1411     D-Dimer, Quantitative 0.45 mg/L (FEU)      Narrative:       Reference Range is 0-0.50 mg/L FEU. However, results <0.50 mg/L FEU tends to rule out DVT or PE. Results >0.50 mg/L FEU are not useful in predicting absence or presence of DVT or PE.    Comprehensive Metabolic Panel [[id number]]  (Abnormal) Collected:  02/02/17 1347    Specimen:  Blood Updated:  02/02/17 1414     Glucose 150 (H) mg/dL      BUN 26 (H) mg/dL      Creatinine 1.19 mg/dL      Sodium 134 (L) mmol/L      Potassium 3.9 mmol/L      Chloride 98 mmol/L      CO2 30.0 mmol/L      Calcium 8.7 mg/dL      Total Protein 6.3 g/dL      Albumin 3.60 g/dL      ALT (SGPT) 40 U/L      AST (SGOT) 15 U/L      Alkaline Phosphatase 59 U/L      Total Bilirubin 1.0 mg/dL      eGFR Non African Amer 64 mL/min/1.73      Globulin 2.7 gm/dL      A/G Ratio 1.3 g/dL      BUN/Creatinine Ratio 21.8      Anion Gap 6.0 mmol/L     C-reactive Protein [[id number]]  (Normal) Collected:  02/02/17 1347    Specimen:  Blood Updated:  02/02/17 1414     C-Reactive Protein <0.50 mg/dL     Urine Culture [[id number]] Collected:  02/02/17 1427    Specimen:  Urine from Urine, Clean Catch Updated:  02/02/17 1445    Digoxin Level [[id number]]  (Normal) Collected:  02/02/17 1347    Specimen:  Blood from Arm, Left Updated:  02/02/17 1454     Digoxin 1.40 ng/mL     Urinalysis With / Culture If Indicated [[id number]]  (Abnormal) Collected:  02/02/17 1427    Specimen:  Urine from Urine, Clean Catch Updated:  02/02/17 1528     Color, UA Yellow      Appearance, UA Cloudy (A)      pH, UA 5.5      Specific Gravity, UA 1.008      Glucose, UA Negative      Ketones, UA Negative      Bilirubin, UA Negative      Blood, UA Large (3+) (A)      Protein, UA Trace (A)      Leuk Esterase, UA Moderate (2+) (A)      Nitrite, UA Negative      Urobilinogen, UA 0.2 E.U./dL     Urinalysis, Microscopic Only [[id number]]  (Abnormal) Collected:  02/02/17 1427    Specimen:  Urine from Urine, Clean Catch Updated:  02/02/17 1528     RBC, UA Too Numerous to Count (A) /HPF      WBC,  UA 6-12 (A) /HPF      Bacteria, UA None Seen /HPF      Squamous Epithelial Cells, UA 0-2 /HPF      Hyaline Casts, UA 0-2 /LPF      Methodology Automated Microscopy     BNP [[id number]]  (Abnormal) Collected:  02/02/17 1347    Specimen:  Blood Updated:  02/02/17 1751     proBNP 5350.0 (H) pg/mL         Imaging Results (last 24 hours)     Procedure Component Value Units Date/Time    XR Chest 2 View [[id number]] Collected:  02/02/17 1441     Updated:  02/02/17 1446    Narrative:       EXAMINATION: XR CHEST 2 VW- 2/2/2017 14:41 CST     HISTORY: Shortness of breath     Comparison: 07/10/2016     Findings:  Heart is magnified but felt to be mildly enlarged. There is diffuse  interstitial prominence and indistinctness of the pulmonary vasculature,  concerning for pulmonary edema. No pleural effusion or pneumothorax.  Lungs appear hyperinflated.       Impression:       Impression: Findings concerning for pulmonary edema. Alternatively,  viral etiologies could have this appearance. Correlate clinically.  This report was finalized on 02/02/2017 14:44 by Dr. Art Vasquez MD.        I have personally reviewed and interpreted the radiology studies and ECG obtained at time of admission.     Assessment / Plan     Assessment & Plan  1.  Acute on chronic diastolic heart failure (last ejection fraction recorded was 60% in 2014).  2.  Dyspnea.  3.  Lower extremity edema.  4.  Paroxysmal atrial fibrillation on chronic anticoagulation with Xarelto.   5.  Bradycardia, asymptomatic at present.  6.  Relative hypotension.  7.  Microscopic hematuria.  8.  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  Ongoing tobacco abuse with underlying COPD.  10. Megaloblastic anemia.    He is admitted to [facility].  We will place him on  telemetry.  He will be diuresed with intravenous Lasix twice per day.  I will continue his Aldactone as well.  He also is chronically on Diamox.  I will hold his  lisinopril and carvedilol given his blood pressure and bradycardia.  I will continue on digoxin. He needs an updated echocardiogram which has been ordered.  We will continue his Xarelto.  I will consult Dr. Ca to see him.    Monitor on telemetry. Follow blood pressure closely.    Check hemoglobin A1c, fasting lipid panel, and TSH.    He will eventually need workup for microscopic hematuria. E was given a dose of Rocephin in the emergency department for a presumed urinary tract infection.  He has hematuria and no UTI by my interpretation.  There are no bacteria.    Code Status: Full.      I discussed the patients findings and my recommendations with the patient and his ER nurse.    Estimated length of stay is 3-4 days.     Will Galo DO   02/02/17   6:41 PM               Electronically signed by Will Galo DO at 2/2/2017  6:53 PM           Emergency Department Notes      Arnulfo Carcamo Jr., MD at 2/2/2017  1:30 PM          Subjective   HPI Comments: Is a 28-year-old white male who presents here today with complaint of cough and shortness of breath and lower extremity edema.  Patient states this is been occurring since Christmas.  He reports he has not followed up with primary care provider that he is currently in between doctors.  Patient does have a history of chronic atrial fibrillation for which he does follow with Dr. Box.  Patient states that he does take 20 mg of Lasix daily and reports that he has been persistently taking this as well as Aldactone.  Patient reports that he does monitor salt intake in his diet. He reports that since Christmas he has had difficulty with walking short distances from the bathroom to the living room.  He reports that he become short of breath when he takes office closed.  Patient reports he has had about a 5 pound weight gain since Christmas.  He states he does have a cough with thick white phlegm.  Patient presents ER today for further evaluation.  She denies  fever or chest pain at this time.    Patient is a 51 y.o. male presenting with shortness of breath.   History provided by:  Patient   used: No    Shortness of Breath   Severity:  Mild  Onset quality:  Gradual  Duration:  6 weeks  Timing:  Constant  Progression:  Worsening  Chronicity:  New  Context: activity    Context: not animal exposure, not emotional upset, not fumes, not known allergens, not occupational exposure, not pollens, not smoke exposure, not strong odors, not URI and not weather changes    Relieved by:  Nothing  Worsened by:  Nothing  Ineffective treatments:  None tried  Associated symptoms: sputum production    Associated symptoms: no abdominal pain, no chest pain, no claudication, no cough, no diaphoresis, no ear pain, no fever, no headaches, no hemoptysis, no neck pain, no PND, no rash, no sore throat, no syncope, no swollen glands, no vomiting and no wheezing    Risk factors: obesity    Risk factors: no recent alcohol use, no family hx of DVT, no hx of cancer, no hx of PE/DVT, no oral contraceptive use, no prolonged immobilization, no recent surgery and no tobacco use        Review of Systems   Constitutional: Negative for diaphoresis and fever.   HENT: Negative for ear pain and sore throat.    Respiratory: Positive for sputum production and shortness of breath. Negative for cough, hemoptysis and wheezing.    Cardiovascular: Negative for chest pain, claudication, syncope and PND.   Gastrointestinal: Negative for abdominal pain and vomiting.   Musculoskeletal: Negative for neck pain.   Skin: Negative for rash.   Neurological: Negative for headaches. other systems reviewed and are negative.      Past Medical History   Diagnosis Date   • Cardiomegaly    • CHF (NYHA class I, ACC/AHA stage B)    • DVT (deep venous thrombosis)    • S/P cardiac cath    • Severe left ventricular systolic dysfunction    • Sleep apnea        No Known Allergies    History reviewed. No pertinent past  surgical history.    Family History   Problem Relation Age of Onset   • Heart disease Mother    • Heart disease Father    • Heart disease Maternal Grandmother    • Heart disease Maternal Grandfather    • Heart disease Paternal Grandmother    • Heart disease Paternal Grandfather        Social History     Social History   • Marital status: Single     Spouse name: N/A   • Number of children: N/A   • Years of education: N/A     Social History Main Topics   • Smoking status: Current Every Day Smoker     Packs/day: 0.50   • Smokeless tobacco: None   • Alcohol use No   • Drug use: No   • Sexual activity: Defer     Other Topics Concern   • None     Social History Narrative           Objective   Physical Exam   Constitutional: He is oriented to person, place, and time. He appears well-developed and well-nourished.   HENT:   Head: Normocephalic and atraumatic.   Eyes: Conjunctivae are normal. Pupils are equal, round, and reactive to light.   Neck: Normal range of motion. Neck supple.   Cardiovascular: Normal rate, regular rhythm and normal heart sounds.    Pulmonary/Chest: Effort normal and breath sounds normal.   Abdominal: Soft. Bowel sounds are normal.   Musculoskeletal: Normal range of motion.        Legs:  Neurological: He is alert and oriented to person, place, and time.   Skin: Skin is warm and dry.   Psychiatric: He has a normal mood and affect. note and vitals reviewed.      Procedures        ED Course  ED Course   Value Comment By Time    CXR:   Impression: Findings concerning for pulmonary edema. Alternatively,  viral etiologies could have this appearance. Correlate clinically.     MATEO Carbajal 02/02 1509    BLE venous doppler: no DVT visualized MATEO Carbajal 02/02 1509   Specific Gravity, UA: 1.008 (Reviewed) MATEO Carbajal 02/02 1724   Leuk Esterase, UA: (!) Moderate (2+) (Reviewed) MATEO Carbajal 02/02 1724   Blood, UA: (!) Large (3+) (Reviewed) Kim Barnard, MATEO  02/02 1724    Labs at this time.  Patient urinalysis shows a large amount of blood, trace amount of protein, moderate amount leuk esterase, gently to count red blood cells, 16 WBC.  His INR was elevated at 2.3.  PT 26.5 and PTT 2.6.  His BNP is 5350.  Kim Barnard, APRN 02/02 1753    Patient did have 2 sets of blood cultures drawn while the ER today.  He also received 1 g of Rocephin IV for his urinary tract infection.  His bilateral venous Doppler ultrasound was negative.  Chest x-ray showed findings concerning for pulmonary edema..  He stated patient's heart rate has been anywhere from 55 on the way down to 33 which was reportedly per nursing staff.  A repeat EKG was obtained after the original hCG which did show an atrial fibrillation with a slow ventricular response at 57 bpm.  I did go and speak to the patient and reevaluate him his heart rate was 38 and then immediately went back up into the 50s.  At this time patient does not wish to be discharged home.  He states that he feels like he has too much fluid on board of any severe admitted to be evaluated further.  This is been discussed with Dr. Carcamo at this time is evaluating the patient as well. Kim Barnard, APRN 02/02 1754                  [facility]    Final diagnoses:   Systolic congestive heart failure, unspecified congestive heart failure chronicity           Arnulfo Carcamo Jr., MD  02/02/17 [id number]       Electronically signed by Arnulfo Carcamo Jr., MD at 2/2/2017  6:08 PM        Intake & Output (last day)       02/02 0701 - 02/03 0700 02/03 0701 - 02/04 0700    P.O. 1440 640    Total Intake(mL/kg) 1440 (8.4) 640 (3.7)    Urine (mL/kg/hr) 800 150 (0.1)    Total Output 800 150    Net +640 +490          Unmeasured Urine Occurrence 1 x           Lab Results (last 24 hours)     Procedure Component Value Units Date/Time    CBC & Differential [[id number]] Collected:  02/02/17 1347    Specimen:  Blood Updated:  02/02/17 9497    Narrative:       The  following orders were created for panel order CBC & Differential.  Procedure                               Abnormality         Status                     ---------                               -----------         ------                     CBC Auto Differential[[id number]]         Abnormal            Final result                 Please view results for these tests on the individual orders.    CBC Auto Differential [[id number]]  (Abnormal) Collected:  02/02/17 1347    Specimen:  Blood Updated:  02/02/17 1357     WBC 6.13 10*3/mm3      RBC 4.99 10*6/mm3      Hemoglobin 16.6 g/dL      Hematocrit 52.3 (H) %      .8 (H) fL      MCH 33.3 (H) pg      MCHC 31.7 (L) g/dL      RDW 16.8 (H) %      RDW-SD 64.0 (H) fl      MPV 12.9 (H) fL      Platelets 120 (L) 10*3/mm3      Neutrophil % 65.0 %      Lymphocyte % 22.5 %      Monocyte % 10.6 %      Eosinophil % 1.1 %      Basophil % 0.3 %      Immature Grans % 0.5 %      Neutrophils, Absolute 3.98 10*3/mm3      Lymphocytes, Absolute 1.38 10*3/mm3      Monocytes, Absolute 0.65 10*3/mm3      Eosinophils, Absolute 0.07 10*3/mm3      Basophils, Absolute 0.02 10*3/mm3      Immature Grans, Absolute 0.03 10*3/mm3     POC Troponin, Rapid [[id number]]  (Normal) Collected:  02/02/17 1354    Specimen:  Blood Updated:  02/02/17 1411     Troponin I 0.00 ng/mL       Serial Number: [id number]    : [id number]       Protime-INR [[id number]]  (Abnormal) Collected:  02/02/17 1347    Specimen:  Blood Updated:  02/02/17 1411     Protime 26.5 (H) Seconds      INR 2.34 (H)     aPTT [[id number]]  (Abnormal) Collected:  02/02/17 1347    Specimen:  Blood Updated:  02/02/17 1411     PTT 42.6 (H) seconds     D-dimer, Quantitative [[id number]]  (Normal) Collected:  02/02/17 1347    Specimen:  Blood Updated:  02/02/17 1411     D-Dimer, Quantitative 0.45 mg/L (FEU)     Narrative:       Reference Range is 0-0.50 mg/L FEU. However, results <0.50 mg/L FEU tends to rule out DVT or PE. Results >0.50 mg/L FEU are not  useful in predicting absence or presence of DVT or PE.    Comprehensive Metabolic Panel [[id number]]  (Abnormal) Collected:  02/02/17 1347    Specimen:  Blood Updated:  02/02/17 1414     Glucose 150 (H) mg/dL      BUN 26 (H) mg/dL      Creatinine 1.19 mg/dL      Sodium 134 (L) mmol/L      Potassium 3.9 mmol/L      Chloride 98 mmol/L      CO2 30.0 mmol/L      Calcium 8.7 mg/dL      Total Protein 6.3 g/dL      Albumin 3.60 g/dL      ALT (SGPT) 40 U/L      AST (SGOT) 15 U/L      Alkaline Phosphatase 59 U/L      Total Bilirubin 1.0 mg/dL      eGFR Non African Amer 64 mL/min/1.73      Globulin 2.7 gm/dL      A/G Ratio 1.3 g/dL      BUN/Creatinine Ratio 21.8      Anion Gap 6.0 mmol/L     C-reactive Protein [[id number]]  (Normal) Collected:  02/02/17 1347    Specimen:  Blood Updated:  02/02/17 1414     C-Reactive Protein <0.50 mg/dL     Digoxin Level [[id number]]  (Normal) Collected:  02/02/17 1347    Specimen:  Blood from Arm, Left Updated:  02/02/17 1454     Digoxin 1.40 ng/mL     Urinalysis With / Culture If Indicated [[id number]]  (Abnormal) Collected:  02/02/17 1427    Specimen:  Urine from Urine, Clean Catch Updated:  02/02/17 1528     Color, UA Yellow      Appearance, UA Cloudy (A)      pH, UA 5.5      Specific Gravity, UA 1.008      Glucose, UA Negative      Ketones, UA Negative      Bilirubin, UA Negative      Blood, UA Large (3+) (A)      Protein, UA Trace (A)      Leuk Esterase, UA Moderate (2+) (A)      Nitrite, UA Negative      Urobilinogen, UA 0.2 E.U./dL     Urinalysis, Microscopic Only [[id number]]  (Abnormal) Collected:  02/02/17 1427    Specimen:  Urine from Urine, Clean Catch Updated:  02/02/17 1528     RBC, UA Too Numerous to Count (A) /HPF      WBC, UA 6-12 (A) /HPF      Bacteria, UA None Seen /HPF      Squamous Epithelial Cells, UA 0-2 /HPF      Hyaline Casts, UA 0-2 /LPF      Methodology Automated Microscopy     BNP [[id number]]  (Abnormal) Collected:  02/02/17 1347    Specimen:  Blood Updated:  02/02/17 8994      proBNP 5350.0 (H) pg/mL     CBC (No Diff) [[id number]]  (Abnormal) Collected:  02/03/17 0425    Specimen:  Blood Updated:  02/03/17 0502     WBC 5.85 10*3/mm3      RBC 4.93 10*6/mm3      Hemoglobin 16.2 g/dL      Hematocrit 52.1 (H) %      .7 (H) fL      MCH 32.9 (H) pg      MCHC 31.1 (L) g/dL      RDW 17.0 (H) %      RDW-SD 64.8 (H) fl      MPV 13.1 (H) fL      Platelets 122 (L) 10*3/mm3     Lipid Panel [[id number]]  (Abnormal) Collected:  02/03/17 0425    Specimen:  Blood Updated:  02/03/17 0509     Total Cholesterol 110 (L) mg/dL      Triglycerides 143 mg/dL      HDL Cholesterol 22 (L) mg/dL      LDL Cholesterol  81 mg/dL      LDL/HDL Ratio 2.70     Basic Metabolic Panel [[id number]]  (Abnormal) Collected:  02/03/17 0425    Specimen:  Blood Updated:  02/03/17 0518     Glucose 169 (H) mg/dL      BUN 27 (H) mg/dL      Creatinine 1.22 mg/dL      Sodium 137 mmol/L      Potassium 3.9 mmol/L      Chloride 99 mmol/L      CO2 31.0 mmol/L      Calcium 8.9 mg/dL      eGFR Non African Amer 62 mL/min/1.73      BUN/Creatinine Ratio 22.1      Anion Gap 7.0 mmol/L     Narrative:       GFR Normal >60  Chronic Kidney Disease <60  Kidney Failure <15    TSH [[id number]]  (Normal) Collected:  02/03/17 0425    Specimen:  Blood Updated:  02/03/17 0528     TSH 3.250 mIU/mL     Hemoglobin A1c [[id number]] Collected:  02/03/17 0425    Specimen:  Blood Updated:  02/03/17 0603     Hemoglobin A1C 8.8 %     Narrative:       Less than 6.0           Non-Diabetic Range  6.0-7.0                 ADA Therapeutic Target  Greater than 7.0        Action Suggested    Blood Culture [[id number]]  (Normal) Collected:  02/02/17 1842    Specimen:  Blood from Arm, Left Updated:  02/03/17 0701     Blood Culture No growth at less than 24 hours     Blood Culture [[id number]]  (Normal) Collected:  02/02/17 1842    Specimen:  Blood from Arm, Right Updated:  02/03/17 0701     Blood Culture No growth at less than 24 hours     Urine Culture [[id number]]  (Abnormal)  Collected:  02/02/17 1427    Specimen:  Urine from Urine, Clean Catch Updated:  02/03/17 0806     Urine Culture        10,000-20,000 CFU/mL Mixed Gram Positive Rossy (A)    Narrative:       Probable Contaminant        Imaging Results (last 24 hours)     Procedure Component Value Units Date/Time    XR Chest 2 View [[id number]] Collected:  02/02/17 1441     Updated:  02/02/17 1446    Narrative:       EXAMINATION: XR CHEST 2 VW- 2/2/2017 14:41 CST     HISTORY: Shortness of breath     Comparison: 07/10/2016     Findings:  Heart is magnified but felt to be mildly enlarged. There is diffuse  interstitial prominence and indistinctness of the pulmonary vasculature,  concerning for pulmonary edema. No pleural effusion or pneumothorax.  Lungs appear hyperinflated.       Impression:       Impression: Findings concerning for pulmonary edema. Alternatively,  viral etiologies could have this appearance. Correlate clinically.  This report was finalized on 02/02/2017 14:44 by Dr. Art Vasquez MD.          Orders (last 24 hrs)     Start     Ordered    02/04/17 0600  Basic Metabolic Panel  Morning Draw      02/03/17 0933    02/03/17 1200  digoxin (LANOXIN) tablet 125 mcg  Daily Digoxin,   Status:  Discontinued      02/02/17 2017 02/03/17 1045  perflutren (DEFINITY) 8.476 mg in sodium chloride 10 mL injection  Once in Imaging      02/03/17 1003    02/03/17 0940  Adult Transthoracic Echo Complete With Contrast  Once      02/02/17 2011 02/03/17 0900  colchicine tablet 0.6 mg  Daily      02/02/17 2017 02/03/17 0900  rivaroxaban (XARELTO) tablet 20 mg  Daily      02/02/17 2017 02/03/17 0900  spironolactone (ALDACTONE) tablet 50 mg  Daily      02/02/17 2017 02/03/17 0600  Basic Metabolic Panel  Morning Draw      02/02/17 2011 02/03/17 0600  CBC (No Diff)  Morning Draw      02/02/17 2011 02/03/17 0600  Hemoglobin A1c  Morning Draw      02/02/17 2011 02/03/17 0600  Lipid Panel  Morning Draw      02/02/17 2011     02/03/17 0600  TSH  Morning Draw      02/02/17 2011 02/03/17 0000  Vital Signs  Every 4 Hours      02/02/17 2011 02/02/17 2200  Incentive Spirometry  Every 2 Hours While Awake      02/02/17 2011 02/02/17 2130  budesonide-formoterol (SYMBICORT) 80-4.5 MCG/ACT inhaler 2 puff  2 Times Daily - RT      02/02/17 2017 02/02/17 2100  furosemide (LASIX) injection 40 mg  2 Times Daily      02/02/17 2017 02/02/17 2100  acetaZOLAMIDE (DIAMOX) 12 hr capsule 500 mg  2 Times Daily      02/02/17 2017 02/02/17 2100  tiotropium (SPIRIVA) 18 MCG per inhalation capsule 1 capsule  Daily - RT      02/02/17 2017 02/02/17 2100  Strict Intake and Output  Every Hour      02/02/17 2011 02/02/17 2012  Weigh patient  Once      02/02/17 2011 02/02/17 2012  Oxygen Therapy-  Continuous      02/02/17 2011 02/02/17 2012  Insert Peripheral IV  Once      02/02/17 2011 02/02/17 2012  Saline Lock & Maintain IV Access  Continuous      02/02/17 2011 02/02/17 2012  Full Code  Continuous      02/02/17 2011 02/02/17 2012  VTE Risk Assessment - Moderate Risk  Once      02/02/17 2011 02/02/17 2012  Pharmacologic VTE Prophylaxis Not Indicated: Currently Anticoagulated  Once      02/02/17 2011 02/02/17 2012  Mechanical VTE Prophylaxis Not Indicated: Dermatitis / Infection to Bilateral Lower Extremities  Once      02/02/17 2011 02/02/17 2012  Diet Regular; Thin; Cardiac  Diet Effective Now      02/02/17 2011 02/02/17 2012  Echocardiogram 2D complete  Once,   Status:  Canceled      02/02/17 2011 02/02/17 2012  Inpatient Consult to Cardiology  Once     Specialty:  Cardiology  Provider:  Curtis Ca MD    02/02/17 2011 02/02/17 2011  acetaminophen (TYLENOL) tablet 650 mg  Every 4 Hours PRN      02/02/17 2011 02/02/17 2011  ondansetron (ZOFRAN) injection 4 mg  Every 6 Hours PRN      02/02/17 2011 02/02/17 2011  sodium chloride 0.9 % flush 1-10 mL  As Needed      02/02/17 2011 02/02/17 1808  Inpatient  Admission  Once      02/02/17 1808    02/02/17 1757  furosemide (LASIX) injection 40 mg  Once      02/02/17 1755    02/02/17 1755  cefTRIAXone (ROCEPHIN) 1 g/100 mL 0.9% NS (MBP)  Once      02/02/17 1753    02/02/17 1754  Blood Culture  Once      02/02/17 1753    02/02/17 1754  Blood Culture  Once      02/02/17 1753    02/02/17 1733  ECG 12 Lead  Once      02/02/17 1733    02/02/17 1623  Vital signs  Per Hospital Policy,   Status:  Canceled      02/02/17 1622    02/02/17 1512  Vital signs  Per Hospital Policy,   Status:  Canceled      02/02/17 1511    02/02/17 1510  BNP  Once      02/02/17 1509    02/02/17 1446  Urinalysis, Microscopic Only  Once      02/02/17 1445    02/02/17 1446  Urine Culture  Once      02/02/17 1445    02/02/17 1412  POC Troponin, Rapid  Once      02/02/17 1411    02/02/17 1357  Digoxin Level  STAT      02/02/17 1356    02/02/17 1329  sodium chloride 0.9 % flush 10 mL  As Needed      02/02/17 1329    Unscheduled  Oxygen Therapy- Nasal Cannula; 2 LPM; Titrate for spO2: equal to or greater than, 92%  As Needed      02/02/17 1513                  Consult Notes (last 24 hours) (Notes from 2/2/2017  1:35 PM through 2/3/2017  1:35 PM)      MATEO Lo at 2/3/2017  9:07 AM  Version 2 of 2         [facility] HEART GROUP CONSULT NOTE    Referring Provider: Will Galo DO    Reason for Consultation: CHF     Chief Complaint   Patient presents with   • Leg Swelling   • Cough       Subjective .     History of present illness:  Donte Bishop is a 51 y.o. male with a known PMH significant for chronic combined CHF, Atrial Fibrillation anticoagulated with Xarelto, COPD, tobacco abuse, ETOH use, DVT, DONTE, obesity, dyslipidemia, who presented to [facility] via ED with complaints of a 1.5 month hx of weight gain, LE edema, and dyspnea. Patient was admitted to  telemetry under the care of the hospitalist team to 4B telemetry with a Cardiology consult. He has reportedly been compliant with  his medications and low sodium diet.   He was found to have microscopic hematuria. He is unaware of ever having this problem. He denies oneyda blood in his urine. He has a history of alcohol abuse. He says he has not had any alcohol since Christmas. He previously drank whiskey on a nearly daily basis.    History  Past Medical History   Diagnosis Date   • Alcohol abuse, in remission      Quit 12/2016   • Anemia    • Arthritis    • Atrial fibrillation    • Cardiomegaly    • CHF (NYHA class I, ACC/AHA stage B)    • Chronic anticoagulation      Xarelto    • COPD (chronic obstructive pulmonary disease)    • DVT (deep venous thrombosis)    • New onset type 2 diabetes mellitus 2/3/2017   • Obesity    • S/P cardiac cath    • Severe left ventricular systolic dysfunction    • Sleep apnea    • Tobacco abuse      1/2 PPD    ,   Past Surgical History   Procedure Laterality Date   • Eye surgery     • Fracture surgery     • Fracture surgery Left      left arm. has screws  and pins   • Cardiac catheterization  2014     Nonischemic Cardiomyopathy    ,   Family History   Problem Relation Age of Onset   • Heart disease Mother    • Heart disease Father    • Heart disease Maternal Grandmother    • Heart disease Maternal Grandfather    • Heart disease Paternal Grandmother    • Heart disease Paternal Grandfather    ,   Social History   Substance Use Topics   • Smoking status: Current Every Day Smoker     Packs/day: 0.50   • Smokeless tobacco: None   • Alcohol use No      Comment: was occassional..none for two months   ,     Medications  Current Facility-Administered Medications   Medication Dose Route Frequency Provider Last Rate Last Dose   • acetaminophen (TYLENOL) tablet 650 mg  650 mg Oral Q4H PRN Will Galo DO       • acetaZOLAMIDE (DIAMOX) 12 hr capsule 500 mg  500 mg Oral BID Will Galo DO   500 mg at 02/03/17 1100   • budesonide-formoterol (SYMBICORT) 80-4.5 MCG/ACT inhaler 2 puff  2 puff Inhalation BID - RT Will GUADALUPE  DO [place]   2 puff at 02/02/17 2041   • colchicine tablet 0.6 mg  0.6 mg Oral Daily Will Galo DO   0.6 mg at 02/03/17 1100   • furosemide (LASIX) injection 40 mg  40 mg Intravenous BID Will Galo DO   40 mg at 02/03/17 1101   • ondansetron (ZOFRAN) injection 4 mg  4 mg Intravenous Q6H PRN Will Galo DO       • rivaroxaban (XARELTO) tablet 20 mg  20 mg Oral Daily Will Galo DO   20 mg at 02/03/17 1100   • sodium chloride 0.9 % flush 1-10 mL  1-10 mL Intravenous PRN Will Galo DO       • sodium chloride 0.9 % flush 10 mL  10 mL Intravenous PRN MATEO Carbajal       • spironolactone (ALDACTONE) tablet 50 mg  50 mg Oral Daily Will Galo DO   50 mg at 02/03/17 1100   • tiotropium (SPIRIVA) 18 MCG per inhalation capsule 1 capsule  1 capsule Inhalation Daily - RT Will Galo DO   1 capsule at 02/02/17 2041       Allergies:  Review of patient's allergies indicates no known allergies.    Review of Systems  Review of Systems   Constitution: Positive for weakness and malaise/fatigue. Negative for diaphoresis and fever.   Cardiovascular: Positive for dyspnea on exertion and leg swelling. Negative for chest pain and irregular heartbeat.   Respiratory: Positive for cough, shortness of breath and sputum production (clear).    Skin: Positive for poor wound healing. Negative for rash.   Musculoskeletal: Negative for falls.   Gastrointestinal: Positive for heartburn. Negative for bloating, abdominal pain, nausea and vomiting.   Genitourinary: Negative for hematuria.   Neurological: Negative for dizziness, focal weakness and light-headedness.   Psychiatric/Behavioral: Negative for altered mental status.       Objective     Physical Exam:  Patient Vitals for the past 24 hrs:   BP Temp Temp src Pulse Resp SpO2 Height Weight   02/03/17 0700 124/66 98 °F (36.7 °C) Temporal Art 66 21 92 % - -   02/03/17 0404 131/52 98 °F (36.7 °C) Oral 56 20 93 % - -   02/02/17 2338 113/52 97.7 °F (36.5 °C) Oral  "62 18 93 % - -   02/02/17 1958 123/57 97.6 °F (36.4 °C) Oral 57 20 90 % 71\" (180.3 cm) (!) 380 lb (172 kg)   02/02/17 1901 - 98.2 °F (36.8 °C) Oral - - - - -   02/02/17 1847 115/66 - - (!) 48 - 96 % - -   02/02/17 1837 119/65 - - (!) 47 - 97 % - -   02/02/17 1825 110/59 - - (!) 45 - 97 % - -   02/02/17 1802 116/59 - - - - 96 % - -   02/02/17 1747 118/57 - - (!) 49 - 95 % - -   02/02/17 1717 104/46 - - (!) 47 - 93 % - -   02/02/17 1702 104/63 - - (!) 43 - 94 % - -   02/02/17 1646 105/56 - - (!) 45 - 95 % - -   02/02/17 1631 111/59 - - 57 - 94 % - -   02/02/17 1617 113/62 - - (!) 47 - 96 % - -   02/02/17 1602 110/51 - - 51 - 93 % - -   02/02/17 1549 102/49 - - 62 - 95 % - -   02/02/17 1517 108/61 - - (!) 48 - 96 % - -   02/02/17 1507 - - - 55 - (!) 89 % - -   02/02/17 1503 - - - (!) 43 - 90 % - -   02/02/17 1502 - - - (!) 47 - 90 % - -   02/02/17 1501 122/57 - - (!) 48 - 91 % - -   02/02/17 1457 - - - (!) 48 - 90 % - -   02/02/17 1446 101/53 - - (!) 48 - 94 % - -   02/02/17 1439 108/54 - - - - - - -   02/02/17 1347 100/47 - - 56 - 90 % - -   02/02/17 1342 110/58 - - (!) 46 - 91 % - -   02/02/17 1253 104/42 97.4 °F (36.3 °C) - 50 18 96 % 71\" (180.3 cm) (!) 380 lb (172 kg)     Physical Exam   Constitutional: He is oriented to person, place, and time. He is cooperative. No distress. Nasal cannula in place.   Obese. Sitting on the side of the bed. Mother at bedside.    HENT:   Head: Normocephalic and atraumatic.   Cardiovascular: Intact distal pulses.  An irregularly irregular rhythm present. Bradycardia present.  Exam reveals distant heart sounds.  murmur heard.  Telemetry: A-Fib 30-70's with frequent pauses up to 3 seconds.    Pulmonary/Chest: No accessory muscle usage. No respiratory distress. He has decreased breath sounds.   Abdominal: Soft. Bowel sounds are normal. He exhibits no distension. There is no tenderness.   Musculoskeletal: He exhibits edema (BLE 4+pitting edema).   Neurological: He is alert and oriented " to person, place, and time.   Skin: Skin is warm and dry. No rash noted. He is not diaphoretic.   Psychiatric: He has a normal mood and affect. His speech is normal and behavior is normal.   Vitals reviewed.  Patient currently off the floor in Echo.     Results Review:   I reviewed the patient's new clinical results.    Lab Results   Component Value Date    WBC 5.85 02/03/2017    HGB 16.2 02/03/2017    HCT 52.1 (H) 02/03/2017    .7 (H) 02/03/2017     (L) 02/03/2017     Lab Results   Component Value Date    GLUCOSE 169 (H) 02/03/2017    CALCIUM 8.9 02/03/2017     02/03/2017    K 3.9 02/03/2017    CO2 31.0 02/03/2017    CL 99 02/03/2017    BUN 27 (H) 02/03/2017    CREATININE 1.22 02/03/2017    EGFRIFNONA 62 02/03/2017    BCR 22.1 02/03/2017    ANIONGAP 7.0 02/03/2017     Lab Results   Component Value Date    TSH 3.250 02/03/2017     Lab Results   Component Value Date    DDIMER 0.45 02/02/2017     Lab Results   Component Value Date    HGBA1C 8.8 02/03/2017     Lab Results   Component Value Date    DIGOXIN 1.40 02/02/2017       Imaging Results (last 72 hours)     Procedure Component Value Units Date/Time    XR Chest 2 View [[id number]] Collected:  02/02/17 1441     Updated:  02/02/17 1446    Narrative:       EXAMINATION: XR CHEST 2 VW- 2/2/2017 14:41 CST     HISTORY: Shortness of breath     Comparison: 07/10/2016     Findings:  Heart is magnified but felt to be mildly enlarged. There is diffuse  interstitial prominence and indistinctness of the pulmonary vasculature,  concerning for pulmonary edema. No pleural effusion or pneumothorax.  Lungs appear hyperinflated.       Impression:       Impression: Findings concerning for pulmonary edema. Alternatively,  viral etiologies could have this appearance. Correlate clinically.  This report was finalized on 02/02/2017 14:44 by Dr. Art Vasquez MD.            Principal Problem:    -Acute on chronic combined systolic and diastolic congestive heart failure r/t  Nonischemic Cardiomyopathy, EF as been as low as 30%, most recently 60% per 2014 Echo with diastolic dysfunction.     Active Problems:    -Paroxysmal atrial fibrillation- rate controlled/Bradycardic     -Chronic anticoagulation r/t paroxysmal A-Fib and DVT hx     -Type 2 DM- new onset wit georges A1C of 8.8     -Microscopic hematuria    -Bradycardia down to 30 BPM overnight with frequent pauses lasting up to 3 seconds.     -COPD (chronic obstructive pulmonary disease)    -Dyslipidemia    -Obstructive sleep apnea syndrome    -Morbid obesity    -Tobacco abuse, 1/2 PPD     -Megaloblastic anemia    -History of alcohol abuse, in remission since 12/2016       Plan   1. Monitor telemetry  2. Strict I&O, daily weight, low sodium diet  3. Anticoagulated with Xarelto  4. Coreg on hold due to bradycardia, Lisinopril on hold due to hypotension  5. Hold Digoxin- not Dig-toxic but having frequent pauses up to 3 seconds.   6. Agree with IV diuresis with Lasix  7. Echo-pending  8. BMP in am     Further orders per Dr. Ca upon his evaluation of the patient.     Thank you for the consultation, cardiology will gladly continue to follow.     MATEO Lo        Please note this cardiology consultation note is the result of a face to face consultation with the patient, in addition to reviewing medical records at length by myself, MATEO Alves.     Time: More than 50% of time spent in counseling and coordination of care:  Total face-to-face/floor time 45 min.  Time spent in counseling 20 min. Counseling included the following topics: Lab results, ECG/telemetry, reviewign medical records at length, assessment, discussing the plan of care      Electronically signed by MATEO Lo at 2/3/2017 11:19 AM      MATEO Lo at 2/3/2017  9:07 AM  Version 1 of 2         [facility] HEART GROUP CONSULT NOTE    Referring Provider: Will Galo DO    Reason for Consultation: CHF     Chief  Complaint   Patient presents with   • Leg Swelling   • Cough       Subjective .     History of present illness:  Donte Bishop is a 51 y.o. male with a known PMH significant for chronic combined CHF, Atrial Fibrillation anticoagulated with Xarelto, COPD, tobacco abuse, ETOH use, DVT, DONTE, obesity, dyslipidemia, who presented to [facility] via ED with complaints of a 1.5 month hx of weight gain, LE edema, and dyspnea. Patient was admitted to  telemetry under the care of the hospitalist team to  telemetry with a Cardiology consult. He has reportedly been compliant with his medications and low sodium diet.   He was found to have microscopic hematuria. He is unaware of ever having this problem. He denies oneyda blood in his urine. He has a history of alcohol abuse. He says he has not had any alcohol since Christmas. He previously drank whiskey on a nearly daily basis.    History  Past Medical History   Diagnosis Date   • Alcohol abuse, in remission      Quit 12/2016   • Anemia    • Arthritis    • Atrial fibrillation    • Cardiomegaly    • CHF (NYHA class I, ACC/AHA stage B)    • Chronic anticoagulation      Xarelto    • COPD (chronic obstructive pulmonary disease)    • DVT (deep venous thrombosis)    • New onset type 2 diabetes mellitus 2/3/2017   • Obesity    • S/P cardiac cath    • Severe left ventricular systolic dysfunction    • Sleep apnea    • Tobacco abuse      1/2 PPD    ,   Past Surgical History   Procedure Laterality Date   • Eye surgery     • Fracture surgery     • Fracture surgery Left      left arm. has screws  and pins   • Cardiac catheterization  2014     Nonischemic Cardiomyopathy    ,   Family History   Problem Relation Age of Onset   • Heart disease Mother    • Heart disease Father    • Heart disease Maternal Grandmother    • Heart disease Maternal Grandfather    • Heart disease Paternal Grandmother    • Heart disease Paternal Grandfather    ,   Social History   Substance Use Topics   • Smoking status:  "Current Every Day Smoker     Packs/day: 0.50   • Smokeless tobacco: None   • Alcohol use No      Comment: was occassional..none for two months   ,     Medications  Current Facility-Administered Medications   Medication Dose Route Frequency Provider Last Rate Last Dose   • acetaminophen (TYLENOL) tablet 650 mg  650 mg Oral Q4H PRN Will Galo DO       • acetaZOLAMIDE (DIAMOX) 12 hr capsule 500 mg  500 mg Oral BID Will Galo DO   500 mg at 02/02/17 2151   • budesonide-formoterol (SYMBICORT) 80-4.5 MCG/ACT inhaler 2 puff  2 puff Inhalation BID - RT Will Galo DO   2 puff at 02/02/17 2041   • colchicine tablet 0.6 mg  0.6 mg Oral Daily Will Galo DO       • furosemide (LASIX) injection 40 mg  40 mg Intravenous BID Will Galo DO   40 mg at 02/02/17 2158   • ondansetron (ZOFRAN) injection 4 mg  4 mg Intravenous Q6H PRN Will Galo DO       • rivaroxaban (XARELTO) tablet 20 mg  20 mg Oral Daily Will Galo DO       • sodium chloride 0.9 % flush 1-10 mL  1-10 mL Intravenous PRN Will Galo DO       • sodium chloride 0.9 % flush 10 mL  10 mL Intravenous PRN MATEO Carbajal       • spironolactone (ALDACTONE) tablet 50 mg  50 mg Oral Daily Will Galo DO       • tiotropium (SPIRIVA) 18 MCG per inhalation capsule 1 capsule  1 capsule Inhalation Daily - RT Will Galo DO   1 capsule at 02/02/17 2041       Allergies:  Review of patient's allergies indicates no known allergies.    Review of Systems  Review of Systems   Unable to perform ROS  Patient off the floor in Echo.     Objective     Physical Exam:  Patient Vitals for the past 24 hrs:   BP Temp Temp src Pulse Resp SpO2 Height Weight   02/03/17 0700 124/66 98 °F (36.7 °C) Temporal Art 66 21 92 % - -   02/03/17 0404 131/52 98 °F (36.7 °C) Oral 56 20 93 % - -   02/02/17 2338 113/52 97.7 °F (36.5 °C) Oral 62 18 93 % - -   02/02/17 1958 123/57 97.6 °F (36.4 °C) Oral 57 20 90 % 71\" (180.3 cm) (!) 380 lb (172 kg)   02/02/17 1901 - " "98.2 °F (36.8 °C) Oral - - - - -   02/02/17 1847 115/66 - - (!) 48 - 96 % - -   02/02/17 1837 119/65 - - (!) 47 - 97 % - -   02/02/17 1825 110/59 - - (!) 45 - 97 % - -   02/02/17 1802 116/59 - - - - 96 % - -   02/02/17 1747 118/57 - - (!) 49 - 95 % - -   02/02/17 1717 104/46 - - (!) 47 - 93 % - -   02/02/17 1702 104/63 - - (!) 43 - 94 % - -   02/02/17 1646 105/56 - - (!) 45 - 95 % - -   02/02/17 1631 111/59 - - 57 - 94 % - -   02/02/17 1617 113/62 - - (!) 47 - 96 % - -   02/02/17 1602 110/51 - - 51 - 93 % - -   02/02/17 1549 102/49 - - 62 - 95 % - -   02/02/17 1517 108/61 - - (!) 48 - 96 % - -   02/02/17 1507 - - - 55 - (!) 89 % - -   02/02/17 1503 - - - (!) 43 - 90 % - -   02/02/17 1502 - - - (!) 47 - 90 % - -   02/02/17 1501 122/57 - - (!) 48 - 91 % - -   02/02/17 1457 - - - (!) 48 - 90 % - -   02/02/17 1446 101/53 - - (!) 48 - 94 % - -   02/02/17 1439 108/54 - - - - - - -   02/02/17 1347 100/47 - - 56 - 90 % - -   02/02/17 1342 110/58 - - (!) 46 - 91 % - -   02/02/17 1253 104/42 97.4 °F (36.3 °C) - 50 18 96 % 71\" (180.3 cm) (!) 380 lb (172 kg)     Physical Exam   Cardiovascular: An irregularly irregular rhythm present. Bradycardia present.    Telemetry: A-Fib 30-70's with frequent pauses up to 3 seconds.    Vitals reviewed.  Patient currently off the floor in Echo.     Results Review:   I reviewed the patient's new clinical results.    Lab Results   Component Value Date    WBC 5.85 02/03/2017    HGB 16.2 02/03/2017    HCT 52.1 (H) 02/03/2017    .7 (H) 02/03/2017     (L) 02/03/2017     Lab Results   Component Value Date    GLUCOSE 169 (H) 02/03/2017    CALCIUM 8.9 02/03/2017     02/03/2017    K 3.9 02/03/2017    CO2 31.0 02/03/2017    CL 99 02/03/2017    BUN 27 (H) 02/03/2017    CREATININE 1.22 02/03/2017    EGFRIFNONA 62 02/03/2017    BCR 22.1 02/03/2017    ANIONGAP 7.0 02/03/2017     Lab Results   Component Value Date    TSH 3.250 02/03/2017     Lab Results   Component Value Date    DDIMER " 0.45 02/02/2017     Lab Results   Component Value Date    HGBA1C 8.8 02/03/2017     Lab Results   Component Value Date    DIGOXIN 1.40 02/02/2017       Imaging Results (last 72 hours)     Procedure Component Value Units Date/Time    XR Chest 2 View [[id number]] Collected:  02/02/17 1441     Updated:  02/02/17 1446    Narrative:       EXAMINATION: XR CHEST 2 VW- 2/2/2017 14:41 CST     HISTORY: Shortness of breath     Comparison: 07/10/2016     Findings:  Heart is magnified but felt to be mildly enlarged. There is diffuse  interstitial prominence and indistinctness of the pulmonary vasculature,  concerning for pulmonary edema. No pleural effusion or pneumothorax.  Lungs appear hyperinflated.       Impression:       Impression: Findings concerning for pulmonary edema. Alternatively,  viral etiologies could have this appearance. Correlate clinically.  This report was finalized on 02/02/2017 14:44 by Dr. Art Vasquez MD.            Principal Problem:    -Acute on chronic combined systolic and diastolic congestive heart failure r/t Nonischemic Cardiomyopathy, EF as been as low as 30%, most recently 60% per 2014 Echo with diastolic dysfunction.     Active Problems:    -Paroxysmal atrial fibrillation- rate controlled/Bradycardic     -Chronic anticoagulation r/t paroxysmal A-Fib and DVT hx     -Type 2 DM- new onset wit georges A1C of 8.8     -Microscopic hematuria    -Bradycardia down to 30 BPM overnight with frequent pauses lasting up to 3 seconds.     -COPD (chronic obstructive pulmonary disease)    -Dyslipidemia    -Obstructive sleep apnea syndrome    -Morbid obesity    -Tobacco abuse, 1/2 PPD     -Megaloblastic anemia    -History of alcohol abuse, in remission since 12/2016       Plan   1. Monitor telemetry  2. Strict I&O, daily weight, low sodium diet  3. Anticoagulated with Xarelto  4. Coreg on hold due to bradycardia, Lisinopril on hold due to hypotension  5. Hold Digoxin- not Dig-toxic but having frequent pauses up to 3  seconds.   6. Agree with IV diuresis with Lasix  7. Echo-pending  8. BMP in am     Further orders per Dr. Ca upon his evaluation of the patient.     Thank you for the consultation, cardiology will gladly continue to follow.     MATEO Lo        Please note this cardiology consultation note is the result of a face to face consultation with the patient, in addition to reviewing medical records at length by myself, MATEO Alves.     Time: More than 50% of time spent in counseling and coordination of care:  Total face-to-face/floor time 45 min.  Time spent in counseling 20 min. Counseling included the following topics: Lab results, ECG/telemetry, reviewign medical records at length, assessment, discussing the plan of care      Electronically signed by MATEO Lo at 2/3/2017  9:55 AM

## 2017-02-03 NOTE — H&P
[facility] Medicine Services  HISTORY AND PHYSICAL    Date of Admission: 2/2/2017  Primary Care Physician: He was seen in [facility] at [facility].  He is looking for a new provider.    Subjective     Chief Complaint: shortness of breath and lower extremity edema    History of Present Illness  Mr. Bishop is a 51-year-old  gentleman who resides in [place], [place].  He has been seen Juliette Chun for primary care.  However, she has recently left her practice and he is in search of new primary care. He has followed with Dr. Ca from cardiology in the past.  He previously had a history of a nonischemic cardiomyopathy and had an ejection fraction is low as 30% in 2014.  An echo was repeated later that year that showed his ejection fraction had increased to 60%.  However, he still had features of diastolic heart failure.    He says he slowly been retaining fluid since around [place].  He last saw Dr. Ca and Juliette Chun in September.  He says that he has been compliant with his medications.  He tells me that he has been drinking nothing but water and has not been eating any foods that contain salt.  Nevertheless, he continues to retain fluid.    He denies associated chest pain.  He has had acceleration of his lower extremity edema.  He complains of orthopnea paroxysmal nocturnal dyspnea.     He is found to have microscopic hematuria.  He is unaware of ever having this problem.  He denies oneyda blood in his urine.    He has a history of alcohol abuse.  He says he has not had any alcohol since Christmas.  He previously drank whiskey on a nearly daily basis.    Review of Systems   Constitutional: Positive for activity change, fatigue and unexpected weight change. Negative for chills, diaphoresis and fever.   Respiratory: Positive for shortness of breath. Negative for cough, chest tightness, wheezing and stridor.    Cardiovascular: Positive for leg swelling.  Negative for chest pain and palpitations.   Gastrointestinal: Positive for abdominal distention. Negative for blood in stool, constipation, diarrhea, nausea and vomiting.   Genitourinary: Negative for difficulty urinating, dysuria, flank pain, frequency, hematuria, penile swelling and scrotal swelling.   Musculoskeletal: Negative for arthralgias and myalgias.   Neurological: Positive for weakness. Negative for dizziness, syncope, light-headedness and numbness.      Otherwise complete ROS reviewed and negative except as mentioned in the HPI.    Past Medical History:   Past Medical History   Diagnosis Date   • Cardiomegaly    • CHF (NYHA class I, ACC/AHA stage B)    • DVT (deep venous thrombosis)    • S/P cardiac cath    • Severe left ventricular systolic dysfunction    • Sleep apnea      Past Surgical History:History reviewed. No pertinent past surgical history.    Social History:  reports that he has been smoking.  He has been smoking about 0.50 packs per day. He does not have any smokeless tobacco history on file. He reports that he does not drink alcohol or use illicit drugs.    Family History: family history includes Heart disease in his father, maternal grandfather, maternal grandmother, mother, paternal grandfather, and paternal grandmother.   His father had a nonischemic cardiomyopathy as well.    Allergies:  No Known Allergies    Medications:  Prior to Admission medications    Medication Sig Start Date End Date Taking? Authorizing Provider   acetaZOLAMIDE (DIAMOX) 500 MG capsule Take 500 mg by mouth 2 (two) times a day.    Historical Provider, MD   budesonide-formoterol (SYMBICORT) 80-4.5 MCG/ACT inhaler Inhale 2 puffs 2 (two) times a day.    Historical Provider, MD   carvedilol (COREG) 12.5 MG tablet Take 12.5 mg by mouth 2 (two) times a day with meals.    Historical Provider, MD   colchicine 0.6 MG tablet Take 0.6 mg by mouth daily.    Historical Provider, MD   digoxin (LANOXIN) 125 MCG tablet Take 125 mcg  "by mouth every day. DOSE UNKNOWN    Historical Provider, MD   furosemide (LASIX) 20 MG tablet Take 20 mg by mouth daily.    Historical Provider, MD   lisinopril (PRINIVIL,ZESTRIL) 5 MG tablet Take 5 mg by mouth daily.    Historical Provider, MD   pseudoephedrine-guaifenesin (MUCINEX D)  MG per 12 hr tablet Take 1 tablet by mouth every 12 (twelve) hours.    Historical Provider, MD   rivaroxaban (XARELTO) 20 MG tablet Take 20 mg by mouth daily.    Historical Provider, MD   spironolactone (ALDACTONE) 50 MG tablet Take 50 mg by mouth daily.    Historical Provider, MD   tiotropium (SPIRIVA) 18 MCG per inhalation capsule Place 1 capsule into inhaler and inhale 1 (one) time daily.    Historical Provider, MD     Objective     Vital Signs:   Visit Vitals   • /46   • Pulse (!) 47   • Temp 97.4 °F (36.3 °C)   • Resp 18   • Ht 71\" (180.3 cm)   • Wt (!) 380 lb (172 kg)   • SpO2 93%   • BMI 53 kg/m2     Physical Exam   Constitutional: He is oriented to person, place, and time. He appears well-developed and well-nourished.   HENT:   Head: Normocephalic and atraumatic.   Eyes: Conjunctivae and EOM are normal. Pupils are equal, round, and reactive to light.   Neck: Neck supple. No JVD present. No thyromegaly present.   Cardiovascular: Normal rate, regular rhythm, normal heart sounds and intact distal pulses.  Exam reveals no gallop and no friction rub.    No murmur heard.  Pulmonary/Chest: Effort normal. No respiratory distress. He has no wheezes. He has rales. He exhibits no tenderness.   Abdominal: Soft. Bowel sounds are normal. He exhibits distension. There is no tenderness. There is no rebound and no guarding.   Musculoskeletal: Normal range of motion. He exhibits edema (1+ bilateral lower extremities with chronic stasis dermatitis and dry skin). He exhibits no tenderness or deformity.   Lymphadenopathy:     He has no cervical adenopathy.   Neurological: He is alert and oriented to person, place, and time. He " displays normal reflexes. No cranial nerve deficit. He exhibits normal muscle tone.   Skin: Skin is warm and dry. No rash noted.   Bilateral lower extremity stasis dermatitis and very dry skin.  On a mycosis of the nails.   Psychiatric: He has a normal mood and affect. His behavior is normal. Judgment and thought content normal.     Results Reviewed:  Lab Results (last 24 hours)     Procedure Component Value Units Date/Time    CBC & Differential [[id number]] Collected:  02/02/17 1347    Specimen:  Blood Updated:  02/02/17 1357    Narrative:       The following orders were created for panel order CBC & Differential.  Procedure                               Abnormality         Status                     ---------                               -----------         ------                     CBC Auto Differential[[id number]]         Abnormal            Final result                 Please view results for these tests on the individual orders.    CBC Auto Differential [[id number]]  (Abnormal) Collected:  02/02/17 1347    Specimen:  Blood Updated:  02/02/17 1357     WBC 6.13 10*3/mm3      RBC 4.99 10*6/mm3      Hemoglobin 16.6 g/dL      Hematocrit 52.3 (H) %      .8 (H) fL      MCH 33.3 (H) pg      MCHC 31.7 (L) g/dL      RDW 16.8 (H) %      RDW-SD 64.0 (H) fl      MPV 12.9 (H) fL      Platelets 120 (L) 10*3/mm3      Neutrophil % 65.0 %      Lymphocyte % 22.5 %      Monocyte % 10.6 %      Eosinophil % 1.1 %      Basophil % 0.3 %      Immature Grans % 0.5 %      Neutrophils, Absolute 3.98 10*3/mm3      Lymphocytes, Absolute 1.38 10*3/mm3      Monocytes, Absolute 0.65 10*3/mm3      Eosinophils, Absolute 0.07 10*3/mm3      Basophils, Absolute 0.02 10*3/mm3      Immature Grans, Absolute 0.03 10*3/mm3     POC Troponin, Rapid [[id number]]  (Normal) Collected:  02/02/17 1354    Specimen:  Blood Updated:  02/02/17 1411     Troponin I 0.00 ng/mL       Serial Number: [id number]    : [id number]       Protime-INR [[id number]]   (Abnormal) Collected:  02/02/17 1347    Specimen:  Blood Updated:  02/02/17 1411     Protime 26.5 (H) Seconds      INR 2.34 (H)     aPTT [[id number]]  (Abnormal) Collected:  02/02/17 1347    Specimen:  Blood Updated:  02/02/17 1411     PTT 42.6 (H) seconds     D-dimer, Quantitative [[id number]]  (Normal) Collected:  02/02/17 1347    Specimen:  Blood Updated:  02/02/17 1411     D-Dimer, Quantitative 0.45 mg/L (FEU)     Narrative:       Reference Range is 0-0.50 mg/L FEU. However, results <0.50 mg/L FEU tends to rule out DVT or PE. Results >0.50 mg/L FEU are not useful in predicting absence or presence of DVT or PE.    Comprehensive Metabolic Panel [[id number]]  (Abnormal) Collected:  02/02/17 1347    Specimen:  Blood Updated:  02/02/17 1414     Glucose 150 (H) mg/dL      BUN 26 (H) mg/dL      Creatinine 1.19 mg/dL      Sodium 134 (L) mmol/L      Potassium 3.9 mmol/L      Chloride 98 mmol/L      CO2 30.0 mmol/L      Calcium 8.7 mg/dL      Total Protein 6.3 g/dL      Albumin 3.60 g/dL      ALT (SGPT) 40 U/L      AST (SGOT) 15 U/L      Alkaline Phosphatase 59 U/L      Total Bilirubin 1.0 mg/dL      eGFR Non African Amer 64 mL/min/1.73      Globulin 2.7 gm/dL      A/G Ratio 1.3 g/dL      BUN/Creatinine Ratio 21.8      Anion Gap 6.0 mmol/L     C-reactive Protein [[id number]]  (Normal) Collected:  02/02/17 1347    Specimen:  Blood Updated:  02/02/17 1414     C-Reactive Protein <0.50 mg/dL     Urine Culture [[id number]] Collected:  02/02/17 1427    Specimen:  Urine from Urine, Clean Catch Updated:  02/02/17 1445    Digoxin Level [[id number]]  (Normal) Collected:  02/02/17 1347    Specimen:  Blood from Arm, Left Updated:  02/02/17 1454     Digoxin 1.40 ng/mL     Urinalysis With / Culture If Indicated [[id number]]  (Abnormal) Collected:  02/02/17 1427    Specimen:  Urine from Urine, Clean Catch Updated:  02/02/17 1528     Color, UA Yellow      Appearance, UA Cloudy (A)      pH, UA 5.5      Specific Gravity, UA 1.008      Glucose, UA  Negative      Ketones, UA Negative      Bilirubin, UA Negative      Blood, UA Large (3+) (A)      Protein, UA Trace (A)      Leuk Esterase, UA Moderate (2+) (A)      Nitrite, UA Negative      Urobilinogen, UA 0.2 E.U./dL     Urinalysis, Microscopic Only [[id number]]  (Abnormal) Collected:  02/02/17 1427    Specimen:  Urine from Urine, Clean Catch Updated:  02/02/17 1528     RBC, UA Too Numerous to Count (A) /HPF      WBC, UA 6-12 (A) /HPF      Bacteria, UA None Seen /HPF      Squamous Epithelial Cells, UA 0-2 /HPF      Hyaline Casts, UA 0-2 /LPF      Methodology Automated Microscopy     BNP [[id number]]  (Abnormal) Collected:  02/02/17 1347    Specimen:  Blood Updated:  02/02/17 1751     proBNP 5350.0 (H) pg/mL         Imaging Results (last 24 hours)     Procedure Component Value Units Date/Time    XR Chest 2 View [[id number]] Collected:  02/02/17 1441     Updated:  02/02/17 1446    Narrative:       EXAMINATION: XR CHEST 2 VW- 2/2/2017 14:41 CST     HISTORY: Shortness of breath     Comparison: 07/10/2016     Findings:  Heart is magnified but felt to be mildly enlarged. There is diffuse  interstitial prominence and indistinctness of the pulmonary vasculature,  concerning for pulmonary edema. No pleural effusion or pneumothorax.  Lungs appear hyperinflated.       Impression:       Impression: Findings concerning for pulmonary edema. Alternatively,  viral etiologies could have this appearance. Correlate clinically.  This report was finalized on 02/02/2017 14:44 by Dr. Art Vasquez MD.        I have personally reviewed and interpreted the radiology studies and ECG obtained at time of admission.     Assessment / Plan     Assessment & Plan  1.  Acute on chronic diastolic heart failure (last ejection fraction recorded was 60% in 2014).  2.  Dyspnea.  3.  Lower extremity edema.  4.  Paroxysmal atrial fibrillation on chronic anticoagulation with Xarelto.   5.  Bradycardia, asymptomatic at present.  6.  Relative hypotension.  7.   Microscopic hematuria.  8.  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  Ongoing tobacco abuse with underlying COPD.  10. Megaloblastic anemia.    He is admitted to [facility].  We will place him on 4B telemetry.  He will be diuresed with intravenous Lasix twice per day.  I will continue his Aldactone as well.  He also is chronically on Diamox.  I will hold his lisinopril and carvedilol given his blood pressure and bradycardia.  I will continue on digoxin. He needs an updated echocardiogram which has been ordered.  We will continue his Xarelto.  I will consult Dr. Ca to see him.    Monitor on telemetry. Follow blood pressure closely.    Check hemoglobin A1c, fasting lipid panel, and TSH.    He will eventually need workup for microscopic hematuria. E was given a dose of Rocephin in the emergency department for a presumed urinary tract infection.  He has hematuria and no UTI by my interpretation.  There are no bacteria.    Code Status: Full.      I discussed the patients findings and my recommendations with the patient and his ER nurse.    Estimated length of stay is 3-4 days.     Will Galo DO   02/02/17   6:41 PM

## 2017-02-03 NOTE — CONSULTS
[facility] CONSULT NOTE    Referring Provider: Will Galo DO    Reason for Consultation: CHF     Chief Complaint   Patient presents with   • Leg Swelling   • Cough       Subjective .     History of present illness:  Donte Bishop is a 51 y.o. male with a known PMH significant for chronic combined CHF, Atrial Fibrillation anticoagulated with Xarelto, COPD, tobacco abuse, ETOH use, DVT, DONTE, obesity, dyslipidemia, who presented to [facility] via ED with complaints of a 1.5 month hx of weight gain, LE edema, and dyspnea. Patient was admitted to  telemetry under the care of the hospitalist team to  telemetry with a Cardiology consult. He has reportedly been compliant with his medications and low sodium diet.   He was found to have microscopic hematuria. He is unaware of ever having this problem. He denies oneyda blood in his urine. He has a history of alcohol abuse. He says he has not had any alcohol since Christmas. He previously drank whiskey on a nearly daily basis.    History  Past Medical History   Diagnosis Date   • Alcohol abuse, in remission      Quit 12/2016   • Anemia    • Arthritis    • Atrial fibrillation    • Cardiomegaly    • CHF (NYHA class I, ACC/AHA stage B)    • Chronic anticoagulation      Xarelto    • COPD (chronic obstructive pulmonary disease)    • DVT (deep venous thrombosis)    • New onset type 2 diabetes mellitus 2/3/2017   • Obesity    • S/P cardiac cath    • Severe left ventricular systolic dysfunction    • Sleep apnea    • Tobacco abuse      1/2 PPD    ,   Past Surgical History   Procedure Laterality Date   • Eye surgery     • Fracture surgery     • Fracture surgery Left      left arm. has screws  and pins   • Cardiac catheterization  2014     Nonischemic Cardiomyopathy    ,   Family History   Problem Relation Age of Onset   • Heart disease Mother    • Heart disease Father    • Heart disease Maternal Grandmother    • Heart disease Maternal Grandfather    • Heart  disease Paternal Grandmother    • Heart disease Paternal Grandfather    ,   Social History   Substance Use Topics   • Smoking status: Current Every Day Smoker     Packs/day: 0.50   • Smokeless tobacco: None   • Alcohol use No      Comment: was occassional..none for two months   ,     Medications  Current Facility-Administered Medications   Medication Dose Route Frequency Provider Last Rate Last Dose   • acetaminophen (TYLENOL) tablet 650 mg  650 mg Oral Q4H PRN Will Galo DO       • acetaZOLAMIDE (DIAMOX) 12 hr capsule 500 mg  500 mg Oral BID Will Galo DO   500 mg at 02/03/17 1100   • budesonide-formoterol (SYMBICORT) 80-4.5 MCG/ACT inhaler 2 puff  2 puff Inhalation BID - RT Will Galo DO   2 puff at 02/02/17 2041   • colchicine tablet 0.6 mg  0.6 mg Oral Daily Will Galo DO   0.6 mg at 02/03/17 1100   • furosemide (LASIX) injection 40 mg  40 mg Intravenous BID Will Galo DO   40 mg at 02/03/17 1101   • ondansetron (ZOFRAN) injection 4 mg  4 mg Intravenous Q6H PRN Will Galo DO       • rivaroxaban (XARELTO) tablet 20 mg  20 mg Oral Daily Will Galo DO   20 mg at 02/03/17 1100   • sodium chloride 0.9 % flush 1-10 mL  1-10 mL Intravenous PRN Will Galo DO       • sodium chloride 0.9 % flush 10 mL  10 mL Intravenous PRN MATEO Carbajal       • spironolactone (ALDACTONE) tablet 50 mg  50 mg Oral Daily Will Galo DO   50 mg at 02/03/17 1100   • tiotropium (SPIRIVA) 18 MCG per inhalation capsule 1 capsule  1 capsule Inhalation Daily - RT Will Galo DO   1 capsule at 02/02/17 2041       Allergies:  Review of patient's allergies indicates no known allergies.    Review of Systems  Review of Systems   Constitution: Positive for weakness and malaise/fatigue. Negative for diaphoresis and fever.   Cardiovascular: Positive for dyspnea on exertion and leg swelling. Negative for chest pain and irregular heartbeat.   Respiratory: Positive for cough, shortness of breath and  "sputum production (clear).    Skin: Positive for poor wound healing. Negative for rash.   Musculoskeletal: Negative for falls.   Gastrointestinal: Positive for heartburn. Negative for bloating, abdominal pain, nausea and vomiting.   Genitourinary: Negative for hematuria.   Neurological: Negative for dizziness, focal weakness and light-headedness.   Psychiatric/Behavioral: Negative for altered mental status.       Objective     Physical Exam:  Patient Vitals for the past 24 hrs:   BP Temp Temp src Pulse Resp SpO2 Height Weight   02/03/17 0700 124/66 98 °F (36.7 °C) Temporal Art 66 21 92 % - -   02/03/17 0404 131/52 98 °F (36.7 °C) Oral 56 20 93 % - -   02/02/17 2338 113/52 97.7 °F (36.5 °C) Oral 62 18 93 % - -   02/02/17 1958 123/57 97.6 °F (36.4 °C) Oral 57 20 90 % 71\" (180.3 cm) (!) 380 lb (172 kg)   02/02/17 1901 - 98.2 °F (36.8 °C) Oral - - - - -   02/02/17 1847 115/66 - - (!) 48 - 96 % - -   02/02/17 1837 119/65 - - (!) 47 - 97 % - -   02/02/17 1825 110/59 - - (!) 45 - 97 % - -   02/02/17 1802 116/59 - - - - 96 % - -   02/02/17 1747 118/57 - - (!) 49 - 95 % - -   02/02/17 1717 104/46 - - (!) 47 - 93 % - -   02/02/17 1702 104/63 - - (!) 43 - 94 % - -   02/02/17 1646 105/56 - - (!) 45 - 95 % - -   02/02/17 1631 111/59 - - 57 - 94 % - -   02/02/17 1617 113/62 - - (!) 47 - 96 % - -   02/02/17 1602 110/51 - - 51 - 93 % - -   02/02/17 1549 102/49 - - 62 - 95 % - -   02/02/17 1517 108/61 - - (!) 48 - 96 % - -   02/02/17 1507 - - - 55 - (!) 89 % - -   02/02/17 1503 - - - (!) 43 - 90 % - -   02/02/17 1502 - - - (!) 47 - 90 % - -   02/02/17 1501 122/57 - - (!) 48 - 91 % - -   02/02/17 1457 - - - (!) 48 - 90 % - -   02/02/17 1446 101/53 - - (!) 48 - 94 % - -   02/02/17 1439 108/54 - - - - - - -   02/02/17 1347 100/47 - - 56 - 90 % - -   02/02/17 1342 110/58 - - (!) 46 - 91 % - -   02/02/17 1253 104/42 97.4 °F (36.3 °C) - 50 18 96 % 71\" (180.3 cm) (!) 380 lb (172 kg)     Physical Exam   Constitutional: He is oriented to " person, place, and time. He is cooperative. No distress. Nasal cannula in place.   Obese. Sitting on the side of the bed. Mother at bedside.    HENT:   Head: Normocephalic and atraumatic.   Cardiovascular: Intact distal pulses.  An irregularly irregular rhythm present. Bradycardia present.  Exam reveals distant heart sounds.    No murmur heard.  Telemetry: A-Fib 30-70's with frequent pauses up to 3 seconds.    Pulmonary/Chest: No accessory muscle usage. No respiratory distress. He has decreased breath sounds.   Abdominal: Soft. Bowel sounds are normal. He exhibits no distension. There is no tenderness.   Musculoskeletal: He exhibits edema (BLE 4+pitting edema).   Neurological: He is alert and oriented to person, place, and time.   Skin: Skin is warm and dry. No rash noted. He is not diaphoretic.   Psychiatric: He has a normal mood and affect. His speech is normal and behavior is normal.   Vitals reviewed.  Patient currently off the floor in [place].     Results Review:   I reviewed the patient's new clinical results.    Lab Results   Component Value Date    WBC 5.85 02/03/2017    HGB 16.2 02/03/2017    HCT 52.1 (H) 02/03/2017    .7 (H) 02/03/2017     (L) 02/03/2017     Lab Results   Component Value Date    GLUCOSE 169 (H) 02/03/2017    CALCIUM 8.9 02/03/2017     02/03/2017    K 3.9 02/03/2017    CO2 31.0 02/03/2017    CL 99 02/03/2017    BUN 27 (H) 02/03/2017    CREATININE 1.22 02/03/2017    EGFRIFNONA 62 02/03/2017    BCR 22.1 02/03/2017    ANIONGAP 7.0 02/03/2017     Lab Results   Component Value Date    TSH 3.250 02/03/2017     Lab Results   Component Value Date    DDIMER 0.45 02/02/2017     Lab Results   Component Value Date    HGBA1C 8.8 02/03/2017     Lab Results   Component Value Date    DIGOXIN 1.40 02/02/2017       Imaging Results (last 72 hours)     Procedure Component Value Units Date/Time    XR Chest 2 View [[id number]] Collected:  02/02/17 1441     Updated:  02/02/17 1446    Narrative:        EXAMINATION: XR CHEST 2 VW- 2/2/2017 14:41 CST     HISTORY: Shortness of breath     Comparison: 07/10/2016     Findings:  Heart is magnified but felt to be mildly enlarged. There is diffuse  interstitial prominence and indistinctness of the pulmonary vasculature,  concerning for pulmonary edema. No pleural effusion or pneumothorax.  Lungs appear hyperinflated.       Impression:       Impression: Findings concerning for pulmonary edema. Alternatively,  viral etiologies could have this appearance. Correlate clinically.  This report was finalized on 02/02/2017 14:44 by Dr. Art Vasquez MD.            Principal Problem:    -Acute on chronic combined systolic and diastolic congestive heart failure r/t Nonischemic Cardiomyopathy, EF as been as low as 30%, most recently 60% per 2014 Echo with diastolic dysfunction.     Active Problems:    -Paroxysmal atrial fibrillation- rate controlled/Bradycardic     -Chronic anticoagulation r/t paroxysmal A-Fib and DVT hx     -Type 2 DM- new onset wit georges A1C of 8.8     -Microscopic hematuria    -Bradycardia down to 30 BPM overnight with frequent pauses lasting up to 3 seconds.     -COPD (chronic obstructive pulmonary disease)    -Dyslipidemia    -Obstructive sleep apnea syndrome    -Morbid obesity    -Tobacco abuse, 1/2 PPD     -Megaloblastic anemia    -History of alcohol abuse, in remission since 12/2016       Plan   1. Monitor telemetry  2. Strict I&O, daily weight, low sodium diet  3. Anticoagulated with Xarelto  4. Coreg on hold due to bradycardia, Lisinopril on hold due to hypotension  5. Hold Digoxin- not Dig-toxic but having frequent pauses up to 3 seconds.   6. Agree with IV diuresis with Lasix  7. Echo-pending  8. BMP in am     Further orders per Dr. Ca upon his evaluation of the patient.     Thank you for the consultation, cardiology will gladly continue to follow.     MATEO Lo        Please note this cardiology consultation note is the result of a face  to face consultation with the patient, in addition to reviewing medical records at length by myself, MATEO Alves.     Time: More than 50% of time spent in counseling and coordination of care:  Total face-to-face/floor time 45 min.  Time spent in counseling 20 min. Counseling included the following topics: Lab results, ECG/telemetry, reviewign medical records at length, assessment, discussing the plan of care

## 2017-02-04 ENCOUNTER — APPOINTMENT (OUTPATIENT)
Dept: CT IMAGING | Facility: HOSPITAL | Age: 52
End: 2017-02-04

## 2017-02-04 LAB
ANION GAP SERPL CALCULATED.3IONS-SCNC: 8 MMOL/L (ref 4–13)
BACTERIA SPEC AEROBE CULT: ABNORMAL
BUN BLD-MCNC: 21 MG/DL (ref 5–21)
BUN/CREAT SERPL: 18.8 (ref 7–25)
CALCIUM SPEC-SCNC: 8.6 MG/DL (ref 8.4–10.4)
CHLORIDE SERPL-SCNC: 94 MMOL/L (ref 98–110)
CO2 SERPL-SCNC: 33 MMOL/L (ref 24–31)
CREAT BLD-MCNC: 1.12 MG/DL (ref 0.5–1.4)
GFR SERPL CREATININE-BSD FRML MDRD: 69 ML/MIN/1.73
GLUCOSE BLD-MCNC: 175 MG/DL (ref 70–100)
GLUCOSE BLDC GLUCOMTR-MCNC: 154 MG/DL (ref 70–130)
GLUCOSE BLDC GLUCOMTR-MCNC: 163 MG/DL (ref 70–130)
GLUCOSE BLDC GLUCOMTR-MCNC: 179 MG/DL (ref 70–130)
GLUCOSE BLDC GLUCOMTR-MCNC: 200 MG/DL (ref 70–130)
MAGNESIUM SERPL-MCNC: 0.9 MG/DL (ref 1.4–2.2)
POTASSIUM BLD-SCNC: 3.5 MMOL/L (ref 3.5–5.3)
SODIUM BLD-SCNC: 135 MMOL/L (ref 135–145)

## 2017-02-04 PROCEDURE — 80048 BASIC METABOLIC PNL TOTAL CA: CPT | Performed by: NURSE PRACTITIONER

## 2017-02-04 PROCEDURE — 82962 GLUCOSE BLOOD TEST: CPT

## 2017-02-04 PROCEDURE — 74176 CT ABD & PELVIS W/O CONTRAST: CPT

## 2017-02-04 PROCEDURE — 94799 UNLISTED PULMONARY SVC/PX: CPT

## 2017-02-04 PROCEDURE — 25010000002 MAGNESIUM SULFATE PER 500 MG OF MAGNESIUM: Performed by: INTERNAL MEDICINE

## 2017-02-04 PROCEDURE — 94640 AIRWAY INHALATION TREATMENT: CPT

## 2017-02-04 PROCEDURE — 99233 SBSQ HOSP IP/OBS HIGH 50: CPT | Performed by: INTERNAL MEDICINE

## 2017-02-04 PROCEDURE — 83735 ASSAY OF MAGNESIUM: CPT | Performed by: INTERNAL MEDICINE

## 2017-02-04 PROCEDURE — 94760 N-INVAS EAR/PLS OXIMETRY 1: CPT

## 2017-02-04 RX ORDER — HYDROCODONE BITARTRATE AND ACETAMINOPHEN 5; 325 MG/1; MG/1
1 TABLET ORAL EVERY 6 HOURS PRN
Status: DISCONTINUED | OUTPATIENT
Start: 2017-02-04 | End: 2017-02-07 | Stop reason: HOSPADM

## 2017-02-04 RX ORDER — POTASSIUM CHLORIDE 750 MG/1
20 CAPSULE, EXTENDED RELEASE ORAL 2 TIMES DAILY WITH MEALS
Status: DISCONTINUED | OUTPATIENT
Start: 2017-02-04 | End: 2017-02-05

## 2017-02-04 RX ADMIN — INSULIN LISPRO 3 UNITS: 100 INJECTION, SOLUTION INTRAVENOUS; SUBCUTANEOUS at 17:12

## 2017-02-04 RX ADMIN — INSULIN LISPRO 2 UNITS: 100 INJECTION, SOLUTION INTRAVENOUS; SUBCUTANEOUS at 21:02

## 2017-02-04 RX ADMIN — ACETAZOLAMIDE 250 MG: 250 TABLET ORAL at 09:58

## 2017-02-04 RX ADMIN — BUDESONIDE AND FORMOTEROL FUMARATE DIHYDRATE 2 PUFF: 160; 4.5 AEROSOL RESPIRATORY (INHALATION) at 21:30

## 2017-02-04 RX ADMIN — METFORMIN HYDROCHLORIDE 500 MG: 500 TABLET ORAL at 17:11

## 2017-02-04 RX ADMIN — ACETAZOLAMIDE 250 MG: 250 TABLET ORAL at 17:12

## 2017-02-04 RX ADMIN — HYDROCODONE BITARTRATE AND ACETAMINOPHEN 1 TABLET: 5; 325 TABLET ORAL at 17:19

## 2017-02-04 RX ADMIN — MAGNESIUM SULFATE HEPTAHYDRATE 2 G: 500 INJECTION, SOLUTION INTRAMUSCULAR; INTRAVENOUS at 10:29

## 2017-02-04 RX ADMIN — BUDESONIDE AND FORMOTEROL FUMARATE DIHYDRATE 2 PUFF: 160; 4.5 AEROSOL RESPIRATORY (INHALATION) at 10:06

## 2017-02-04 RX ADMIN — RIVAROXABAN 20 MG: 20 TABLET, FILM COATED ORAL at 09:58

## 2017-02-04 RX ADMIN — INSULIN LISPRO 2 UNITS: 100 INJECTION, SOLUTION INTRAVENOUS; SUBCUTANEOUS at 12:18

## 2017-02-04 RX ADMIN — LISINOPRIL 2.5 MG: 2.5 TABLET ORAL at 09:58

## 2017-02-04 RX ADMIN — METFORMIN HYDROCHLORIDE 500 MG: 500 TABLET ORAL at 12:18

## 2017-02-04 RX ADMIN — SPIRONOLACTONE 50 MG: 50 TABLET, FILM COATED ORAL at 09:58

## 2017-02-04 RX ADMIN — POTASSIUM CHLORIDE 20 MEQ: 750 CAPSULE, EXTENDED RELEASE ORAL at 17:12

## 2017-02-04 RX ADMIN — TIOTROPIUM BROMIDE 1 CAPSULE: 18 CAPSULE ORAL; RESPIRATORY (INHALATION) at 10:06

## 2017-02-04 RX ADMIN — BUMETANIDE 0.5 MG/HR: 0.25 INJECTION INTRAMUSCULAR; INTRAVENOUS at 09:59

## 2017-02-04 RX ADMIN — POTASSIUM CHLORIDE 20 MEQ: 750 CAPSULE, EXTENDED RELEASE ORAL at 12:18

## 2017-02-04 RX ADMIN — INSULIN LISPRO 2 UNITS: 100 INJECTION, SOLUTION INTRAVENOUS; SUBCUTANEOUS at 09:58

## 2017-02-04 RX ADMIN — COLCHICINE 0.6 MG: 0.6 TABLET, FILM COATED ORAL at 09:58

## 2017-02-04 NOTE — PLAN OF CARE
Problem: Patient Care Overview (Adult)  Goal: Plan of Care Review  Outcome: Ongoing (interventions implemented as appropriate)    02/03/17 1647 02/03/17 2021   Coping/Psychosocial Response Interventions   Plan Of Care Reviewed With patient --    Patient Care Overview   Progress --  no change   Outcome Evaluation   Outcome Summary/Follow up Plan --  Bumex drip started, good urine output. CT scan of abdomen tomorrow. No distress noted       Goal: Adult Individualization and Mutuality  Outcome: Ongoing (interventions implemented as appropriate)  Goal: Discharge Needs Assessment  Outcome: Ongoing (interventions implemented as appropriate)    Problem: Cardiac: Heart Failure (Adult)  Goal: Signs and Symptoms of Listed Potential Problems Will be Absent or Manageable (Cardiac: Heart Failure)  Outcome: Ongoing (interventions implemented as appropriate)    Problem: Arrhythmia/Dysrhythmia (Symptomatic) (Adult)  Goal: Signs and Symptoms of Listed Potential Problems Will be Absent or Manageable (Arrhythmia/Dysrhythmia)  Outcome: Ongoing (interventions implemented as appropriate)

## 2017-02-04 NOTE — PLAN OF CARE
Problem: Patient Care Overview (Adult)  Goal: Plan of Care Review  Outcome: Ongoing (interventions implemented as appropriate)    Problem: Cardiac: Heart Failure (Adult)  Goal: Signs and Symptoms of Listed Potential Problems Will be Absent or Manageable (Cardiac: Heart Failure)  Outcome: Ongoing (interventions implemented as appropriate)    Problem: Arrhythmia/Dysrhythmia (Symptomatic) (Adult)  Goal: Signs and Symptoms of Listed Potential Problems Will be Absent or Manageable (Arrhythmia/Dysrhythmia)  Outcome: Ongoing (interventions implemented as appropriate)    02/04/17 0353   Arrhythmia/Dysrhythmia (Symptomatic)   Problems Assessed (Arrhythmia/Dysrhythmia) all   Problems Present (Arrhythmia/Dysrhythmia) electrophysiological conduction defect

## 2017-02-04 NOTE — PROGRESS NOTES
"    [facility] Medicine Services  INPATIENT PROGRESS NOTE    Length of Stay: 2  Date of Admission: 2/2/2017  Primary Care Physician: No Known Provider (was MATEO Mir)    Subjective   Chief Complaint: edema and dyspnea  HPI   5 liters negative over the last 24 hours with stable renal function. He says he is feeling much better with regards to breathing and edema. He says he was \"up peeing all night.\"     Complains of pain in his legs at times from being in bed. He is on Xarelto and we have not used SCDs because of his edema.     Review of Systems     All pertinent negatives and positives are as above. All other systems have been reviewed and are negative unless otherwise stated.     Objective    Temp:  [96.9 °F (36.1 °C)-98.7 °F (37.1 °C)] 96.9 °F (36.1 °C)  Heart Rate:  [64-73] 72  Resp:  [18-22] 20  BP: (117-144)/(55-74) 117/64  Physical Exam  Constitutional: He is oriented to person, place, and time. He appears well-developed and well-nourished.   Head: Normocephalic and atraumatic.   Eyes: Conjunctivae and EOM are normal. Pupils are equal, round, and reactive to light.   Neck: Neck supple. No JVD present. No thyromegaly present.   Cardiovascular: Normal rate, regular rhythm, normal heart sounds and intact distal pulses. Exam reveals no gallop and no friction rub.   No murmur heard.  Pulmonary/Chest: Effort normal. No respiratory distress. He has no wheezes. He has rales. He exhibits no tenderness.   Abdominal: Soft. Bowel sounds are normal. He exhibits distension. There is no tenderness. There is no rebound and no guarding.   Musculoskeletal: Normal range of motion. He exhibits edema (1+ bilateral lower extremities with chronic stasis dermatitis and dry skin). He exhibits no tenderness or deformity.   Neurological: He is alert and oriented to person, place, and time. He displays normal reflexes. No cranial nerve deficit. He exhibits normal muscle tone.   Skin: Skin is " warm and dry. No rash noted.   Bilateral lower extremity stasis dermatitis and very dry skin. On a mycosis of the nails.   Psychiatric: He has a normal mood and affect. His behavior is normal. Judgment and thought content normal.     Intake/Output Summary (Last 24 hours) at 02/04/17 1111  Last data filed at 02/04/17 1100   Gross per 24 hour   Intake   2360 ml   Output   7875 ml   Net  -5515 ml     Results Review:  I have reviewed the labs, radiology results, and diagnostic studies.    Laboratory Data:     Results from last 7 days  Lab Units 02/04/17  0727 02/03/17  0425 02/02/17  1347   SODIUM mmol/L 135 137 134*   POTASSIUM mmol/L 3.5 3.9 3.9   CHLORIDE mmol/L 94* 99 98   TOTAL CO2 mmol/L 33.0* 31.0 30.0   BUN mg/dL 21 27* 26*   CREATININE mg/dL 1.12 1.22 1.19   CALCIUM mg/dL 8.6 8.9 8.7   BILIRUBIN mg/dL  --   --  1.0   ALK PHOS U/L  --   --  59   ALT (SGPT) U/L  --   --  40   AST (SGOT) U/L  --   --  15   GLUCOSE mg/dL 175* 169* 150*     Radiology Data:   Imaging Results (last 24 hours)     Procedure Component Value Units Date/Time    CT Abdomen Pelvis Without Contrast [[id number]] Collected:  02/04/17 0856     Updated:  02/04/17 0945    Narrative:       CT ABDOMEN AND PELVIS WITHOUT CONTRAST 2/4/2017 8:30 AM CST     HISTORY: Systolic heart failure, hematuria     COMPARISON: None      DLP: 1643 mGy cm     In order to have a CT radiation dose as low as reasonably achievable,  Automated Exposure Control was utilized for adjustment of the mA and/or  KV according to patient size.     TECHNIQUE: Noncontrast enhanced images of the abdomen and pelvis  obtained without oral contrast.      FINDINGS:   Small nodular area along a linear focus of density in the LEFT lung base  (axial image 11) measures up to 7 mm. Follow-up CT chest is recommended  in 6 months for reevaluation.      LIVER: No focal liver lesion.      BILIARY SYSTEM: The gallbladder is unremarkable. No intrahepatic or  extrahepatic ductal dilatation.       PANCREAS: No focal pancreatic lesion.      SPLEEN: Some calcifications in the spleen suggest old granulomatous  change.      KIDNEYS AND ADRENALS: The adrenal glands are unremarkable.  There are  innumerable nonobstructing nephroliths bilaterally. The largest on the  RIGHT is at the inferior pole and measures up to 9 mm. The largest on  the LEFT is also at the inferior pole and measures up to 7 mm. The  ureters are decompressed.     RETROPERITONEUM: No mass, lymphadenopathy or hemorrhage.      GI TRACT: There is diverticulosis without diverticulitis. There is mild  constipation. There is no evidence of bowel obstruction. The appendix  has a normal appearance. There is ingested material within the stomach.     OTHER: There is no mesenteric mass, lymphadenopathy or fluid collection.  Degenerative changes are seen throughout the lumbar spine. There is  transitional anatomy at the lumbosacral junction on the RIGHT.          PELVIS: No mass lesion, fluid collection or significant lymphadenopathy  is seen in the pelvis. The urinary bladder is normal in appearance.       Impression:       1. No evidence of obstructive uropathy. There are numerous bilateral  nonobstructing nephroliths.  2. There is a 7 mm nodular density at the LEFT lung base. Follow-up CT  scan in 6 months is recommended per the below attached guidelines.   3. Diverticulosis without diverticulitis.        Fleischner Society Recommendations for Management of SOLID Pulmonary  Nodules:     1.  If a nodule is less than or equal to 4 mm in size, low risk patients  require no follow up, and high risk patients require a follow-up CT of  the chest at 12 months.  2.  If a nodule is 5-6 mm in size, low risk patients require a follow-up  CT at 12 months, and high risk patients require follow up CT scans at  6-12 months and 18-24 months.  3.  If a nodule is 7-8 mm in size, low risk patients requiring follow-up  at 6-12 months and 18-24 months, and high risk patients  requiring  follow-up at 3-6 months, 9-12 months and 24 months.  4.  If a nodule is greater than 8 mm in size, ALL patients require  follow-up at 3, 9 and 24 months. PET/CT or biopsy should also be  considered.  This report was finalized on 02/04/2017 09:43 by Dr. Tyron Ravi MD.      ECHO Interpretation Summary   · Left ventricular function is normal. Estimated EF = 55%.  · Left ventricular diastolic dysfunction (grade I) consistent with impaired relaxation.  · All left ventricular wall segments contract normally.  · Right ventricular cavity is borderline dilated. Normal function.  · Moderate pulmonary hypertension is present.     Most recent Accu-Cheks are 150, 169, 171, and 154.    I have reviewed the patient current medications.     Assessment/Plan   Assessment:   1. Acute on chronic diastolic heart failure, EF 55%.   2. Moderate pulmonary hypertension.   3. Lower extremity edema and dyspnea.  4. Paroxysmal atrial fibrillation on chronic anticoagulation with Xarelto.   5. Bradycardia, asymptomatic at present.  6. Relative hypotension, improved.  7. Microscopic hematuria with non-obstructing nephrolithiasis.  8. 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   9. Ongoing tobacco abuse with underlying COPD.  10. Megaloblastosis.  11. Type II diabetes mellitus with a hemoglobin A1c of 8.8, new diagnosis.  12. Pulmonary nodule, will require outpatient followup.   13. Hypomagnesmia.      Plan:   Disontinued IV Lasix on 2/3 and placed on a Bumex drip. I would continue this for another 24-48 hours and monitor his status and renal function. Continue Aldactone.     Carvedilol and digoxin are on hold secondary to bradycardia and pauses at presentation.  He has been rate controlled atrial fibrillation on telemetry with no significant bradycardia recently.  However, he continues to have small pauses at times.     Back on a low-dose of lisinopril.    Replace magnesium IV. Place oral  potassium.      Continue on Xarelto.     Start on metformin. Diabetic educator. Nutrition consult.     PT/OT consults.      Discharge Planning: I expect patient to be discharged to home in 2-3 days.    Will Galo DO   02/04/17   11:01 AM

## 2017-02-04 NOTE — PROGRESS NOTES
"[facility] -  Progress Note     LOS: 2 days   Patient Care Team:  No Known Provider as PCP - General      Reason for consultation: CHF     Subjective     Interval History:     Significant diuresis overnight since placed on Bumex gtt. \"I'm breathing so much better\".     Review of Systems:   Review of Systems   Constitution: Positive for weakness and malaise/fatigue. Negative for diaphoresis and fever.   Cardiovascular: Positive for dyspnea on exertion and leg swelling. Negative for chest pain, irregular heartbeat, near-syncope, palpitations, paroxysmal nocturnal dyspnea and syncope.   Respiratory: Positive for shortness of breath (improving) and sputum production (white). Negative for wheezing.    Skin: Negative for rash.   Musculoskeletal: Negative for falls.   Gastrointestinal: Negative for bloating, abdominal pain, nausea and vomiting.   Psychiatric/Behavioral: Negative for altered mental status.        Objective     Vital Sign Min/Max for last 24 hours  Temp  Min: 96.9 °F (36.1 °C)  Max: 98.7 °F (37.1 °C)   BP  Min: 117/64  Max: 144/74   Pulse  Min: 53  Max: 73   Resp  Min: 18  Max: 22   SpO2  Min: 91 %  Max: 93 %   Flow (L/min)  Min: 2  Max: 2   Weight  Min: 392 lb 4 oz (178 kg)  Max: 392 lb 4 oz (178 kg)     Last Weight    02/03/17 2000   Weight: (!) 392 lb 4 oz (178 kg) (standing)         Intake/Output Summary (Last 24 hours) at 02/04/17 1123  Last data filed at 02/04/17 1100   Gross per 24 hour   Intake   2360 ml   Output   7875 ml   Net  -5515 ml         Physical Exam:  Physical Exam   Constitutional: He is oriented to person, place, and time. He appears well-developed and well-nourished. He is cooperative. No distress. Nasal cannula in place.   Resting comfortably in bed. Obese.    HENT:   Head: Normocephalic and atraumatic.   Cardiovascular: Normal rate and intact distal pulses.  An irregularly irregular rhythm present.   Telemetry: A-Fib 61-85 BPM, 2 pauses overnight lasting 2.0 " and 2.37 seconds    Musculoskeletal: He exhibits edema (3+BLE pitting edema).   Neurological: He is alert and oriented to person, place, and time.   Skin: Skin is warm and dry. No rash noted. He is not diaphoretic.   Psychiatric: He has a normal mood and affect. His speech is normal and behavior is normal.        Results Review:   Lab Results   Component Value Date    WBC 5.85 02/03/2017    HGB 16.2 02/03/2017    HCT 52.1 (H) 02/03/2017    .7 (H) 02/03/2017     (L) 02/03/2017     Lab Results   Component Value Date    GLUCOSE 175 (H) 02/04/2017    CALCIUM 8.6 02/04/2017     02/04/2017    K 3.5 02/04/2017    CO2 33.0 (H) 02/04/2017    CL 94 (L) 02/04/2017    BUN 21 02/04/2017    CREATININE 1.12 02/04/2017    EGFRIFNONA 69 02/04/2017    BCR 18.8 02/04/2017    ANIONGAP 8.0 02/04/2017        Echo EF Estimated  Lab Results   Component Value Date    ECHOEFEST 55 02/03/2017       Medication Review: yes  Current Facility-Administered Medications   Medication Dose Route Frequency Provider Last Rate Last Dose   • acetaminophen (TYLENOL) tablet 650 mg  650 mg Oral Q4H PRN Will Galo DO       • acetaZOLAMIDE (DIAMOX) tablet 250 mg  250 mg Oral BID Will Galo DO   250 mg at 02/04/17 0958   • budesonide-formoterol (SYMBICORT) 160-4.5 MCG/ACT inhaler 2 puff  2 puff Inhalation BID - RT Will Galo DO   2 puff at 02/04/17 1006   • bumetanide (BUMEX) 0.1 mg/mL in sodium chloride 0.9 % 100 mL infusion  0.5 mg/hr Intravenous Continuous Will Galo DO 5 mL/hr at 02/04/17 0959 0.5 mg/hr at 02/04/17 0959   • colchicine tablet 0.6 mg  0.6 mg Oral Daily Will Galo DO   0.6 mg at 02/04/17 0958   • dextrose (D50W) solution 25 g  25 g Intravenous Q15 Min PRN Will C Galo, DO       • dextrose (GLUTOSE) oral gel 15 g  15 g Oral Q15 Min PRN Will Galo, DO       • glucagon (human recombinant) (GLUCAGEN DIAGNOSTIC) injection 1 mg  1 mg Subcutaneous Q15 Min PRN Will Galo, DO       • insulin lispro  (humaLOG) injection 2-7 Units  2-7 Units Subcutaneous 4x Daily AC & at Bedtime Will Galo DO   2 Units at 02/04/17 0958   • lisinopril (PRINIVIL,ZESTRIL) tablet 2.5 mg  2.5 mg Oral Q24H Will Galo DO   2.5 mg at 02/04/17 0958   • metFORMIN (GLUCOPHAGE) tablet 500 mg  500 mg Oral BID With Meals Will Galo DO       • ondansetron (ZOFRAN) injection 4 mg  4 mg Intravenous Q6H PRN Will Galo DO       • potassium chloride (MICRO-K) CR capsule 20 mEq  20 mEq Oral BID With Meals Will Galo DO       • rivaroxaban (XARELTO) tablet 20 mg  20 mg Oral Daily Will Galo DO   20 mg at 02/04/17 0958   • sodium chloride 0.9 % flush 1-10 mL  1-10 mL Intravenous PRN Will Galo DO       • sodium chloride 0.9 % flush 10 mL  10 mL Intravenous PRN Kim Barnard, APRN       • spironolactone (ALDACTONE) tablet 50 mg  50 mg Oral Daily Will Galo DO   50 mg at 02/04/17 0958   • tiotropium (SPIRIVA) 18 MCG per inhalation capsule 1 capsule  1 capsule Inhalation Daily - RT Will Galo DO   1 capsule at 02/04/17 1006         Assessment/Plan     Principal Problem:    Acute on chronic combined systolic and diastolic congestive heart failure- significant diuresis since starting Bumex gtt.     Active Problems:    Paroxysmal atrial fibrillation    Nonischemic cardiomyopathy    Chronic anticoagulation    Microscopic hematuria    Bradycardia    New onset type 2 diabetes mellitus    COPD (chronic obstructive pulmonary disease)    Dyslipidemia    Obstructive sleep apnea syndrome    Morbid obesity    Tobacco abuse    Megaloblastic anemia    History of alcohol abuse      Plan   1. Strict I&O, daily weight, low sodium diet  2. Bumex gtt- plans to continue for another day  3. Anticoagulated with Xarelto   4. Monitor telemetry   5. BMP in am       Nila Bianchi, MATEO  02/04/17  11:23 AM

## 2017-02-04 NOTE — PLAN OF CARE
Problem: Patient Care Overview (Adult)  Goal: Plan of Care Review    02/04/17 0407   Outcome Evaluation   Outcome Summary/Follow up Plan hgb a1C 8.8...pt received coverage on SSI

## 2017-02-04 NOTE — PLAN OF CARE
Problem: Patient Care Overview (Adult)  Goal: Plan of Care Review  Outcome: Ongoing (interventions implemented as appropriate)    Problem: Cardiac: Heart Failure (Adult)  Goal: Signs and Symptoms of Listed Potential Problems Will be Absent or Manageable (Cardiac: Heart Failure)  Outcome: Ongoing (interventions implemented as appropriate)    Problem: Arrhythmia/Dysrhythmia (Symptomatic) (Adult)  Goal: Signs and Symptoms of Listed Potential Problems Will be Absent or Manageable (Arrhythmia/Dysrhythmia)  Outcome: Ongoing (interventions implemented as appropriate)

## 2017-02-05 LAB
ANION GAP SERPL CALCULATED.3IONS-SCNC: 7 MMOL/L (ref 4–13)
BUN BLD-MCNC: 21 MG/DL (ref 5–21)
BUN/CREAT SERPL: 18.9 (ref 7–25)
CALCIUM SPEC-SCNC: 8.4 MG/DL (ref 8.4–10.4)
CHLORIDE SERPL-SCNC: 87 MMOL/L (ref 98–110)
CO2 SERPL-SCNC: 39 MMOL/L (ref 24–31)
CREAT BLD-MCNC: 1.11 MG/DL (ref 0.5–1.4)
GFR SERPL CREATININE-BSD FRML MDRD: 69 ML/MIN/1.73
GLUCOSE BLD-MCNC: 180 MG/DL (ref 70–100)
GLUCOSE BLDC GLUCOMTR-MCNC: 176 MG/DL (ref 70–130)
GLUCOSE BLDC GLUCOMTR-MCNC: 176 MG/DL (ref 70–130)
GLUCOSE BLDC GLUCOMTR-MCNC: 188 MG/DL (ref 70–130)
GLUCOSE BLDC GLUCOMTR-MCNC: 195 MG/DL (ref 70–130)
MAGNESIUM SERPL-MCNC: 0.9 MG/DL (ref 1.4–2.2)
POTASSIUM BLD-SCNC: 3.4 MMOL/L (ref 3.5–5.3)
SODIUM BLD-SCNC: 133 MMOL/L (ref 135–145)

## 2017-02-05 PROCEDURE — 25010000002 MAGNESIUM SULFATE PER 500 MG OF MAGNESIUM: Performed by: INTERNAL MEDICINE

## 2017-02-05 PROCEDURE — 94640 AIRWAY INHALATION TREATMENT: CPT

## 2017-02-05 PROCEDURE — 82962 GLUCOSE BLOOD TEST: CPT

## 2017-02-05 PROCEDURE — 80048 BASIC METABOLIC PNL TOTAL CA: CPT | Performed by: INTERNAL MEDICINE

## 2017-02-05 PROCEDURE — 99232 SBSQ HOSP IP/OBS MODERATE 35: CPT | Performed by: NURSE PRACTITIONER

## 2017-02-05 PROCEDURE — 83735 ASSAY OF MAGNESIUM: CPT | Performed by: INTERNAL MEDICINE

## 2017-02-05 RX ORDER — BUMETANIDE 1 MG/1
1 TABLET ORAL 2 TIMES DAILY
Status: DISCONTINUED | OUTPATIENT
Start: 2017-02-06 | End: 2017-02-07

## 2017-02-05 RX ORDER — POTASSIUM CHLORIDE 750 MG/1
40 CAPSULE, EXTENDED RELEASE ORAL 2 TIMES DAILY WITH MEALS
Status: DISCONTINUED | OUTPATIENT
Start: 2017-02-05 | End: 2017-02-07 | Stop reason: HOSPADM

## 2017-02-05 RX ORDER — LISINOPRIL 5 MG/1
5 TABLET ORAL
Status: DISCONTINUED | OUTPATIENT
Start: 2017-02-06 | End: 2017-02-07 | Stop reason: HOSPADM

## 2017-02-05 RX ADMIN — INSULIN LISPRO 2 UNITS: 100 INJECTION, SOLUTION INTRAVENOUS; SUBCUTANEOUS at 11:08

## 2017-02-05 RX ADMIN — BUDESONIDE AND FORMOTEROL FUMARATE DIHYDRATE 2 PUFF: 160; 4.5 AEROSOL RESPIRATORY (INHALATION) at 08:06

## 2017-02-05 RX ADMIN — INSULIN LISPRO 2 UNITS: 100 INJECTION, SOLUTION INTRAVENOUS; SUBCUTANEOUS at 20:30

## 2017-02-05 RX ADMIN — RIVAROXABAN 20 MG: 20 TABLET, FILM COATED ORAL at 09:30

## 2017-02-05 RX ADMIN — BUMETANIDE 0.5 MG/HR: 0.25 INJECTION INTRAMUSCULAR; INTRAVENOUS at 06:06

## 2017-02-05 RX ADMIN — MAGNESIUM GLUCONATE 500 MG ORAL TABLET 400 MG: 500 TABLET ORAL at 08:33

## 2017-02-05 RX ADMIN — HYDROCODONE BITARTRATE AND ACETAMINOPHEN 1 TABLET: 5; 325 TABLET ORAL at 00:58

## 2017-02-05 RX ADMIN — METFORMIN HYDROCHLORIDE 500 MG: 500 TABLET ORAL at 17:13

## 2017-02-05 RX ADMIN — HYDROCODONE BITARTRATE AND ACETAMINOPHEN 1 TABLET: 5; 325 TABLET ORAL at 17:12

## 2017-02-05 RX ADMIN — METFORMIN HYDROCHLORIDE 500 MG: 500 TABLET ORAL at 09:30

## 2017-02-05 RX ADMIN — ACETAZOLAMIDE 250 MG: 250 TABLET ORAL at 09:30

## 2017-02-05 RX ADMIN — LISINOPRIL 2.5 MG: 2.5 TABLET ORAL at 08:33

## 2017-02-05 RX ADMIN — TIOTROPIUM BROMIDE 1 CAPSULE: 18 CAPSULE ORAL; RESPIRATORY (INHALATION) at 08:05

## 2017-02-05 RX ADMIN — POTASSIUM CHLORIDE 40 MEQ: 750 CAPSULE, EXTENDED RELEASE ORAL at 17:12

## 2017-02-05 RX ADMIN — MAGNESIUM GLUCONATE 500 MG ORAL TABLET 400 MG: 500 TABLET ORAL at 17:13

## 2017-02-05 RX ADMIN — COLCHICINE 0.6 MG: 0.6 TABLET, FILM COATED ORAL at 09:30

## 2017-02-05 RX ADMIN — SPIRONOLACTONE 50 MG: 50 TABLET, FILM COATED ORAL at 09:30

## 2017-02-05 RX ADMIN — POTASSIUM CHLORIDE 40 MEQ: 750 CAPSULE, EXTENDED RELEASE ORAL at 09:29

## 2017-02-05 RX ADMIN — INSULIN LISPRO 2 UNITS: 100 INJECTION, SOLUTION INTRAVENOUS; SUBCUTANEOUS at 08:32

## 2017-02-05 RX ADMIN — MAGNESIUM SULFATE HEPTAHYDRATE 2 G: 500 INJECTION, SOLUTION INTRAMUSCULAR; INTRAVENOUS at 09:16

## 2017-02-05 RX ADMIN — BUDESONIDE AND FORMOTEROL FUMARATE DIHYDRATE 2 PUFF: 160; 4.5 AEROSOL RESPIRATORY (INHALATION) at 20:45

## 2017-02-05 RX ADMIN — ACETAZOLAMIDE 250 MG: 250 TABLET ORAL at 17:12

## 2017-02-05 RX ADMIN — INSULIN LISPRO 2 UNITS: 100 INJECTION, SOLUTION INTRAVENOUS; SUBCUTANEOUS at 17:13

## 2017-02-05 NOTE — PLAN OF CARE
Problem: Patient Care Overview (Adult)  Goal: Plan of Care Review  Outcome: Ongoing (interventions implemented as appropriate)    02/05/17 0551   Coping/Psychosocial Response Interventions   Plan Of Care Reviewed With patient   Patient Care Overview   Progress improving   Outcome Evaluation   Outcome Summary/Follow up Plan bumex gtt continued per order, good urine output, PRN pain med given x1 for bilateral leg/knee pain,  this shift 2 units SSI given       Goal: Adult Individualization and Mutuality  Outcome: Ongoing (interventions implemented as appropriate)    Problem: Cardiac: Heart Failure (Adult)  Goal: Signs and Symptoms of Listed Potential Problems Will be Absent or Manageable (Cardiac: Heart Failure)  Outcome: Ongoing (interventions implemented as appropriate)    Problem: Arrhythmia/Dysrhythmia (Symptomatic) (Adult)  Goal: Signs and Symptoms of Listed Potential Problems Will be Absent or Manageable (Arrhythmia/Dysrhythmia)  Outcome: Ongoing (interventions implemented as appropriate)

## 2017-02-05 NOTE — PROGRESS NOTES
"    [facility] Medicine Services  INPATIENT PROGRESS NOTE    Length of Stay: 3  Date of Admission: 2/2/2017  Primary Care Physician: No Known Provider (was MATEO Mir)    Subjective   Chief Complaint: shortness of breath, edema   HPI   He is down 11 liters over the last 48 hours and his renal function is tolerating it without any issue.     He feels considerably better. \"I have legs and can breathe now.\"     He would really like to stop the drip if he can.  He is having quite a bit of trouble from getting up and down so much to urinate at night.  He claims he needs some rest.  I discussed this with his nurse, Angi, and we will discontinue the drip at 6 o'clock tonight and start oral Bumex in the morning.    Review of Systems     All pertinent negatives and positives are as above. All other systems have been reviewed and are negative unless otherwise stated.     Objective    Temp:  [97.3 °F (36.3 °C)-98.1 °F (36.7 °C)] 97.5 °F (36.4 °C)  Heart Rate:  [68-83] 77  Resp:  [18-20] 18  BP: (105-133)/(58-77) 127/65 Telemetry shows atrial flutter from 59-92, no pauses overnight or this morning.   Physical Exam  Constitutional: He is oriented to person, place, and time. He appears well-developed and well-nourished.   Up in bed, watching TV. Discussed with his nurse, Angi.   Head: Normocephalic and atraumatic.   Eyes: Conjunctivae and EOM are normal. Pupils are equal, round, and reactive to light.   Neck: Neck supple. No JVD present. No thyromegaly present.   Cardiovascular: Normal rate, regular rhythm, normal heart sounds and intact distal pulses. Exam reveals no gallop and no friction rub.   No murmur heard.  Pulmonary/Chest: Effort normal. No respiratory distress. He has no wheezes. He has no rales today. He exhibits no tenderness.   Abdominal: Soft. Bowel sounds are normal. He exhibits improved distension. There is no tenderness. There is no rebound and no guarding. "   Musculoskeletal: Normal range of motion. He exhibits edema continues to improve (now trace to 1+ of the bilateral lower extremities with chronic stasis dermatitis and dry skin). He exhibits no tenderness or deformity.   Neurological: He is alert and oriented to person, place, and time. He displays normal reflexes. No cranial nerve deficit. He exhibits normal muscle tone.   Skin: Skin is warm and dry. No rash noted.   Bilateral lower extremity stasis dermatitis and very dry skin. Onychomycosis of the nails.   Psychiatric: He has a normal mood and affect. His behavior is normal. Judgment and thought content normal.     Intake/Output Summary (Last 24 hours) at 02/05/17 1108  Last data filed at 02/05/17 0852   Gross per 24 hour   Intake   1880 ml   Output   8100 ml   Net  -6220 ml     Results Review:  I have reviewed the labs, radiology results, and diagnostic studies.    Laboratory Data:     Results from last 7 days  Lab Units 02/05/17  0659 02/04/17  0727 02/03/17  0425 02/02/17  1347   SODIUM mmol/L 133* 135 137 134*   POTASSIUM mmol/L 3.4* 3.5 3.9 3.9   CHLORIDE mmol/L 87* 94* 99 98   TOTAL CO2 mmol/L 39.0* 33.0* 31.0 30.0   BUN mg/dL 21 21 27* 26*   CREATININE mg/dL 1.11 1.12 1.22 1.19   CALCIUM mg/dL 8.4 8.6 8.9 8.7   BILIRUBIN mg/dL  --   --   --  1.0   ALK PHOS U/L  --   --   --  59   ALT (SGPT) U/L  --   --   --  40   AST (SGOT) U/L  --   --   --  15   GLUCOSE mg/dL 180* 175* 169* 150*     Magnesium 0.9 again.     Most recent Accu-Cheks are 179, 163, 180, 176.    I have reviewed the patient current medications.     Assessment/Plan   Assessment:   1. Acute on chronic diastolic heart failure, EF 55%.   2. Moderate pulmonary hypertension.   3. Lower extremity edema and dyspnea.  4. Paroxysmal atrial fibrillation on chronic anticoagulation with Xarelto.   5. Bradycardia, asymptomatic at present.  6. Relative hypotension, improved.  7. Microscopic hematuria with non-obstructing nephrolithiasis.  8. History of  alcohol abuse, which she says is in remission, last drink in December.   9. Ongoing tobacco abuse with underlying COPD.  10. DONTE on home CPAP.   11. Type II diabetes mellitus with a hemoglobin A1c of 8.8, new diagnosis.  12. Pulmonary nodule, will require outpatient followup.   13. Hypomagnesmia.   14. Slight hypokalemia.   15. Slight hyponatremia.       Plan:   Disontinued IV Lasix on 2/3 and placed on a Bumex drip. I would continue this until 1800 tonight and then start oral Bumex in the morning. Monitor renal function and electrolytes.        Carvedilol and digoxin have been held secondary to bradycardia and pauses at presentation.  He has been rate controlled atrial fibrillation/flutter on telemetry with no significant bradycardia or pauses recently. I will defer to cardiology on restart of these.       Back on a low-dose of lisinopril and will increase to 5 mg po daily.      Replace magnesium IV and orally. Continue oral potassium.       Continue on Xarelto.      Started on metformin on 2/4. Diabetic educator. Nutrition consult.      PT/OT consults.       Discharge Planning: I expect patient to be discharged to home in 1-2 days.    Will Galo,    02/05/17   11:04 AM

## 2017-02-05 NOTE — PROGRESS NOTES
"[facility] -  Progress Note     LOS: 3 days   Patient Care Team:  No Known Provider as PCP - General      Reason for consultation: CHF     Subjective     Interval History:     \"My breathing is a lot better today. My legs are still swollen. I can't sleep bc I'm peeing non stop but I don't want the Bumex gtt off until all this fluid is off\".     Review of Systems:   Review of Systems   Constitution: Positive for weakness and malaise/fatigue. Negative for diaphoresis and fever.   Cardiovascular: Positive for dyspnea on exertion and leg swelling. Negative for chest pain, irregular heartbeat, near-syncope, palpitations, paroxysmal nocturnal dyspnea and syncope.   Respiratory: Positive for shortness of breath (improving) and sputum production (white). Negative for wheezing.    Skin: Negative for rash.   Musculoskeletal: Negative for falls.   Gastrointestinal: Negative for bloating, abdominal pain, nausea and vomiting.   Psychiatric/Behavioral: Negative for altered mental status.        Objective     Vital Sign Min/Max for last 24 hours  Temp  Min: 97.3 °F (36.3 °C)  Max: 98.1 °F (36.7 °C)   BP  Min: 105/58  Max: 135/62   Pulse  Min: 53  Max: 83   Resp  Min: 18  Max: 20   SpO2  Min: 91 %  Max: 93 %   Flow (L/min)  Min: 2  Max: 2   Weight  Min: 379 lb 14.4 oz (172 kg)  Max: 379 lb 14.4 oz (172 kg)     Last Weight    02/04/17  1954   Weight: (!) 379 lb 14.4 oz (172 kg)         Intake/Output Summary (Last 24 hours) at 02/05/17 0842  Last data filed at 02/05/17 0744   Gross per 24 hour   Intake   1880 ml   Output   8775 ml   Net  -6895 ml         Physical Exam:  Physical Exam   Constitutional: He is oriented to person, place, and time. He appears well-developed and well-nourished. He is cooperative. No distress. Nasal cannula in place.   Resting comfortably in bed. Obese.    HENT:   Head: Normocephalic and atraumatic.   Cardiovascular: Normal rate and intact distal pulses.  An irregularly irregular " rhythm present.   Telemetry: A-Flutter 59-92 BPM, no pauses overnight or this morning    Pulmonary/Chest: Effort normal and breath sounds normal. No accessory muscle usage. No respiratory distress.   Abdominal: Soft. Bowel sounds are normal. He exhibits no distension. There is no tenderness.   Musculoskeletal: He exhibits edema (2+ BLE pitting edema- improved compared to yesterday).   Neurological: He is alert and oriented to person, place, and time.   Skin: Skin is warm and dry. No rash noted. He is not diaphoretic.   Psychiatric: He has a normal mood and affect. His speech is normal and behavior is normal.        Results Review:   Lab Results   Component Value Date    WBC 5.85 02/03/2017    HGB 16.2 02/03/2017    HCT 52.1 (H) 02/03/2017    .7 (H) 02/03/2017     (L) 02/03/2017     Lab Results   Component Value Date    GLUCOSE 180 (H) 02/05/2017    CALCIUM 8.4 02/05/2017     (L) 02/05/2017    K 3.4 (L) 02/05/2017    CO2 39.0 (H) 02/05/2017    CL 87 (L) 02/05/2017    BUN 21 02/05/2017    CREATININE 1.11 02/05/2017    EGFRIFNONA 69 02/05/2017    BCR 18.9 02/05/2017    ANIONGAP 7.0 02/05/2017        Echo EF Estimated  Lab Results   Component Value Date    ECHOEFEST 55 02/03/2017       Medication Review: yes  Current Facility-Administered Medications   Medication Dose Route Frequency Provider Last Rate Last Dose   • acetaminophen (TYLENOL) tablet 650 mg  650 mg Oral Q4H PRN Will Galo DO       • acetaZOLAMIDE (DIAMOX) tablet 250 mg  250 mg Oral BID Will Galo DO   250 mg at 02/04/17 1712   • budesonide-formoterol (SYMBICORT) 160-4.5 MCG/ACT inhaler 2 puff  2 puff Inhalation BID - RT Will Galo DO   2 puff at 02/05/17 0806   • bumetanide (BUMEX) 0.1 mg/mL in sodium chloride 0.9 % 100 mL infusion  0.5 mg/hr Intravenous Continuous Will Galo DO 5 mL/hr at 02/05/17 0606 0.5 mg/hr at 02/05/17 0606   • colchicine tablet 0.6 mg  0.6 mg Oral Daily Will Galo DO   0.6 mg at 02/04/17  0958   • dextrose (D50W) solution 25 g  25 g Intravenous Q15 Min PRN Will Galo DO       • dextrose (GLUTOSE) oral gel 15 g  15 g Oral Q15 Min PRN Will Galo DO       • glucagon (human recombinant) (GLUCAGEN DIAGNOSTIC) injection 1 mg  1 mg Subcutaneous Q15 Min PRN Will Galo DO       • HYDROcodone-acetaminophen (NORCO) 5-325 MG per tablet 1 tablet  1 tablet Oral Q6H PRN Will Galo DO   1 tablet at 02/05/17 0058   • insulin lispro (humaLOG) injection 2-7 Units  2-7 Units Subcutaneous 4x Daily AC & at Bedtime Will Galo DO   2 Units at 02/05/17 0832   • lisinopril (PRINIVIL,ZESTRIL) tablet 2.5 mg  2.5 mg Oral Q24H Will Galo DO   2.5 mg at 02/05/17 0833   • magnesium oxide (MAGOX) tablet 400 mg  400 mg Oral BID Will Galo DO   400 mg at 02/05/17 0833   • magnesium sulfate 2 g in dextrose (D5W) 5 % 100 mL IVPB  2 g Intravenous Once Will Galo DO       • metFORMIN (GLUCOPHAGE) tablet 500 mg  500 mg Oral BID With Meals Will Galo DO   500 mg at 02/04/17 1711   • ondansetron (ZOFRAN) injection 4 mg  4 mg Intravenous Q6H PRN Will Galo DO       • potassium chloride (MICRO-K) CR capsule 40 mEq  40 mEq Oral BID With Meals Will Galo DO       • rivaroxaban (XARELTO) tablet 20 mg  20 mg Oral Daily Will Galo DO   20 mg at 02/04/17 0958   • sodium chloride 0.9 % flush 1-10 mL  1-10 mL Intravenous PRN Will Galo DO       • sodium chloride 0.9 % flush 10 mL  10 mL Intravenous PRN MATEO Carbajal       • spironolactone (ALDACTONE) tablet 50 mg  50 mg Oral Daily Will Galo DO   50 mg at 02/04/17 0958   • tiotropium (SPIRIVA) 18 MCG per inhalation capsule 1 capsule  1 capsule Inhalation Daily - RT Will C Galo, DO   1 capsule at 02/05/17 0805         Assessment/Plan     Principal Problem:    Acute on chronic combined systolic and diastolic congestive heart failure- significant diuresis since starting Bumex gtt. 13lb weight loss since admission.      Active Problems:    Paroxysmal atrial fibrillation, rate controlled. No pauses overnight.     Nonischemic cardiomyopathy    Chronic anticoagulation with Xarelto     Microscopic hematuria    Bradycardia    New onset type 2 diabetes mellitus    COPD (chronic obstructive pulmonary disease)    Dyslipidemia    Obstructive sleep apnea syndrome- compliant with home CPAP     Morbid obesity    Tobacco abuse    Megaloblastic anemia    History of alcohol abuse    Hyponatremia    Hypokalemia       Plan   1. Strict I&O, daily weight, low sodium diet  2. Bumex gtt- will likely d/c today- defer to primary who started the gtt  3. Anticoagulated with Xarelto   4. Monitor telemetry   5. BMP in am   6. K+ replacement  7. Continue Lisinopril. BB on hold due to bradycardia and pauses.       Nila Bianchi, APRN  02/05/17  8:42 AM

## 2017-02-06 LAB
ANION GAP SERPL CALCULATED.3IONS-SCNC: 8 MMOL/L (ref 4–13)
BUN BLD-MCNC: 24 MG/DL (ref 5–21)
BUN/CREAT SERPL: 19.8 (ref 7–25)
CALCIUM SPEC-SCNC: 8.7 MG/DL (ref 8.4–10.4)
CHLORIDE SERPL-SCNC: 85 MMOL/L (ref 98–110)
CO2 SERPL-SCNC: 38 MMOL/L (ref 24–31)
CREAT BLD-MCNC: 1.21 MG/DL (ref 0.5–1.4)
GFR SERPL CREATININE-BSD FRML MDRD: 63 ML/MIN/1.73
GLUCOSE BLD-MCNC: 198 MG/DL (ref 70–100)
GLUCOSE BLDC GLUCOMTR-MCNC: 188 MG/DL (ref 70–130)
GLUCOSE BLDC GLUCOMTR-MCNC: 198 MG/DL (ref 70–130)
GLUCOSE BLDC GLUCOMTR-MCNC: 215 MG/DL (ref 70–130)
MAGNESIUM SERPL-MCNC: 1.2 MG/DL (ref 1.4–2.2)
POTASSIUM BLD-SCNC: 3.1 MMOL/L (ref 3.5–5.3)
SODIUM BLD-SCNC: 131 MMOL/L (ref 135–145)

## 2017-02-06 PROCEDURE — 94640 AIRWAY INHALATION TREATMENT: CPT

## 2017-02-06 PROCEDURE — G8979 MOBILITY GOAL STATUS: HCPCS

## 2017-02-06 PROCEDURE — 25010000002 POTASSIUM CHLORIDE PER 2 MEQ: Performed by: FAMILY MEDICINE

## 2017-02-06 PROCEDURE — 82962 GLUCOSE BLOOD TEST: CPT

## 2017-02-06 PROCEDURE — 80048 BASIC METABOLIC PNL TOTAL CA: CPT | Performed by: INTERNAL MEDICINE

## 2017-02-06 PROCEDURE — G8987 SELF CARE CURRENT STATUS: HCPCS | Performed by: OCCUPATIONAL THERAPIST

## 2017-02-06 PROCEDURE — G8980 MOBILITY D/C STATUS: HCPCS

## 2017-02-06 PROCEDURE — 97166 OT EVAL MOD COMPLEX 45 MIN: CPT | Performed by: OCCUPATIONAL THERAPIST

## 2017-02-06 PROCEDURE — G8989 SELF CARE D/C STATUS: HCPCS | Performed by: OCCUPATIONAL THERAPIST

## 2017-02-06 PROCEDURE — G8978 MOBILITY CURRENT STATUS: HCPCS

## 2017-02-06 PROCEDURE — G8988 SELF CARE GOAL STATUS: HCPCS | Performed by: OCCUPATIONAL THERAPIST

## 2017-02-06 PROCEDURE — 99233 SBSQ HOSP IP/OBS HIGH 50: CPT | Performed by: INTERNAL MEDICINE

## 2017-02-06 PROCEDURE — 97161 PT EVAL LOW COMPLEX 20 MIN: CPT

## 2017-02-06 PROCEDURE — 83735 ASSAY OF MAGNESIUM: CPT | Performed by: INTERNAL MEDICINE

## 2017-02-06 RX ORDER — POTASSIUM CHLORIDE 14.9 MG/ML
20 INJECTION INTRAVENOUS ONCE
Status: COMPLETED | OUTPATIENT
Start: 2017-02-06 | End: 2017-02-06

## 2017-02-06 RX ORDER — CARVEDILOL 3.12 MG/1
3.12 TABLET ORAL 2 TIMES DAILY WITH MEALS
Status: DISCONTINUED | OUTPATIENT
Start: 2017-02-06 | End: 2017-02-07 | Stop reason: HOSPADM

## 2017-02-06 RX ADMIN — SPIRONOLACTONE 50 MG: 50 TABLET, FILM COATED ORAL at 10:34

## 2017-02-06 RX ADMIN — ACETAZOLAMIDE 250 MG: 250 TABLET ORAL at 18:49

## 2017-02-06 RX ADMIN — COLCHICINE 0.6 MG: 0.6 TABLET, FILM COATED ORAL at 09:08

## 2017-02-06 RX ADMIN — ACETAZOLAMIDE 250 MG: 250 TABLET ORAL at 09:08

## 2017-02-06 RX ADMIN — LISINOPRIL 5 MG: 5 TABLET ORAL at 09:08

## 2017-02-06 RX ADMIN — MAGNESIUM GLUCONATE 500 MG ORAL TABLET 400 MG: 500 TABLET ORAL at 18:51

## 2017-02-06 RX ADMIN — BUDESONIDE AND FORMOTEROL FUMARATE DIHYDRATE 2 PUFF: 160; 4.5 AEROSOL RESPIRATORY (INHALATION) at 06:59

## 2017-02-06 RX ADMIN — BUDESONIDE AND FORMOTEROL FUMARATE DIHYDRATE 2 PUFF: 160; 4.5 AEROSOL RESPIRATORY (INHALATION) at 20:05

## 2017-02-06 RX ADMIN — MAGNESIUM GLUCONATE 500 MG ORAL TABLET 400 MG: 500 TABLET ORAL at 09:08

## 2017-02-06 RX ADMIN — INSULIN LISPRO 3 UNITS: 100 INJECTION, SOLUTION INTRAVENOUS; SUBCUTANEOUS at 21:15

## 2017-02-06 RX ADMIN — POTASSIUM CHLORIDE 40 MEQ: 750 CAPSULE, EXTENDED RELEASE ORAL at 18:49

## 2017-02-06 RX ADMIN — METFORMIN HYDROCHLORIDE 500 MG: 500 TABLET ORAL at 09:08

## 2017-02-06 RX ADMIN — METFORMIN HYDROCHLORIDE 500 MG: 500 TABLET ORAL at 18:49

## 2017-02-06 RX ADMIN — INSULIN LISPRO 2 UNITS: 100 INJECTION, SOLUTION INTRAVENOUS; SUBCUTANEOUS at 18:50

## 2017-02-06 RX ADMIN — POTASSIUM CHLORIDE 40 MEQ: 750 CAPSULE, EXTENDED RELEASE ORAL at 09:08

## 2017-02-06 RX ADMIN — BUMETANIDE 1 MG: 1 TABLET ORAL at 18:49

## 2017-02-06 RX ADMIN — INSULIN LISPRO 2 UNITS: 100 INJECTION, SOLUTION INTRAVENOUS; SUBCUTANEOUS at 09:07

## 2017-02-06 RX ADMIN — CARVEDILOL 3.12 MG: 3.12 TABLET, FILM COATED ORAL at 16:05

## 2017-02-06 RX ADMIN — RIVAROXABAN 20 MG: 20 TABLET, FILM COATED ORAL at 09:08

## 2017-02-06 RX ADMIN — POTASSIUM CHLORIDE 20 MEQ: 200 INJECTION, SOLUTION INTRAVENOUS at 16:05

## 2017-02-06 RX ADMIN — BUMETANIDE 1 MG: 1 TABLET ORAL at 09:08

## 2017-02-06 RX ADMIN — TIOTROPIUM BROMIDE 1 CAPSULE: 18 CAPSULE ORAL; RESPIRATORY (INHALATION) at 06:52

## 2017-02-06 NOTE — PLAN OF CARE
Problem: Patient Care Overview (Adult)  Goal: Plan of Care Review  Outcome: Ongoing (interventions implemented as appropriate)    02/05/17 1803   Coping/Psychosocial Response Interventions   Plan Of Care Reviewed With patient   Patient Care Overview   Progress improving   Outcome Evaluation   Outcome Summary/Follow up Plan Bumex gtt D/c'd, good urine output       Goal: Adult Individualization and Mutuality  Outcome: Ongoing (interventions implemented as appropriate)  Goal: Discharge Needs Assessment  Outcome: Ongoing (interventions implemented as appropriate)    Problem: Cardiac: Heart Failure (Adult)  Goal: Signs and Symptoms of Listed Potential Problems Will be Absent or Manageable (Cardiac: Heart Failure)  Outcome: Ongoing (interventions implemented as appropriate)    Problem: Arrhythmia/Dysrhythmia (Symptomatic) (Adult)  Goal: Signs and Symptoms of Listed Potential Problems Will be Absent or Manageable (Arrhythmia/Dysrhythmia)  Outcome: Ongoing (interventions implemented as appropriate)

## 2017-02-06 NOTE — PLAN OF CARE
Problem: Patient Care Overview (Adult)  Goal: Plan of Care Review  Outcome: Ongoing (interventions implemented as appropriate)    02/06/17 1189   Coping/Psychosocial Response Interventions   Plan Of Care Reviewed With patient   Patient Care Overview   Progress improving   Outcome Evaluation   Outcome Summary/Follow up Plan OT Eval completed. Pt is independent with all mobility and Modified independent with ADL tasks. He reports he is back to his baseline functional status with ADL and mobility. He denies the need for OT services at this time. Anticipated d/c is home with family.

## 2017-02-06 NOTE — CONSULTS
"Pt awake and alert.  New diagnosis with A1C of 8.8%.  Has not been able to exercise due to SOA and chronic back pain from injury.  He lost his PCP also.  Discussed action of Metformin.  Given glucometer and demo for home use.  Discussed goal.  Has seen Dietitian this visit also. Discussed need for Foot Care to prevent complications.  Feet very dry and nails very thick.  Feet have \"been numb\" for years.  Discussed need to see Podiatrist and possibility of special shoes due to [place].  He has been unable to take care of feet due to SOA and so forth.  "

## 2017-02-06 NOTE — PROGRESS NOTES
Continued Stay Note  [facility]     Patient Name: Donte Bishop  MRN: [id number]  Today's Date: 2/6/2017    Admit Date: 2/2/2017          Discharge Plan       02/06/17 0834    Case Management/Social Work Plan    Plan PT continues to plan to dc home with no known needs. Will continue to follow.     Patient/Family In Agreement With Plan yes              Discharge Codes     None            SANDI Wood

## 2017-02-06 NOTE — PROGRESS NOTES
[facility] Medicine Services  INPATIENT PROGRESS NOTE    Length of Stay: 4  Date of Admission: 2/2/2017  Primary Care Physician: No Known Provider    Subjective     Chief Complaint:     Shortness of breath, edema.    HPI     The patient has had an additional 1.5 L out over the last 24 hours.  He is breathing much better and is no longer short of breath with exertion.  He has transitioned to oral diuretics without difficulty.      Review of Systems       All pertinent negatives and positives are as above. All other systems have been reviewed and are negative unless otherwise stated.     Objective    Temp:  [97 °F (36.1 °C)-98.5 °F (36.9 °C)] 97.1 °F (36.2 °C)  Heart Rate:  [70-91] 91  Resp:  [16-20] 18  BP: (110-139)/(52-91) 124/60    Lab Results (last 24 hours)     Procedure Component Value Units Date/Time    POC Glucose Fingerstick [[id number]]  (Abnormal) Collected:  02/05/17 1607    Specimen:  Blood Updated:  02/05/17 1624     Glucose 176 (H) mg/dL       : [id number] Kenny LisaMeter ID: [id number]       Blood Culture [[id number]]  (Normal) Collected:  02/02/17 1842    Specimen:  Blood from Arm, Left Updated:  02/05/17 1901     Blood Culture No growth at 3 days     Blood Culture [[id number]]  (Normal) Collected:  02/02/17 1842    Specimen:  Blood from Arm, Right Updated:  02/05/17 1901     Blood Culture No growth at 3 days     POC Glucose Fingerstick [[id number]]  (Abnormal) Collected:  02/05/17 1943    Specimen:  Blood Updated:  02/05/17 2013     Glucose 195 (H) mg/dL       : [id number] Doug LisaMeter ID: [id number]       Basic Metabolic Panel [[id number]]  (Abnormal) Collected:  02/06/17 0310    Specimen:  Blood Updated:  02/06/17 0453     Glucose 198 (H) mg/dL      BUN 24 (H) mg/dL      Creatinine 1.21 mg/dL      Sodium 131 (L) mmol/L      Potassium 3.1 (L) mmol/L      Chloride 85 (L) mmol/L      CO2 38.0 (H) mmol/L      Calcium 8.7 mg/dL      eGFR Non  Amer 63  mL/min/1.73      BUN/Creatinine Ratio 19.8      Anion Gap 8.0 mmol/L     Narrative:       GFR Normal >60  Chronic Kidney Disease <60  Kidney Failure <15    Magnesium [[id number]]  (Abnormal) Collected:  02/06/17 0310    Specimen:  Blood Updated:  02/06/17 0453     Magnesium 1.2 (L) mg/dL     POC Glucose Fingerstick [[id number]]  (Abnormal) Collected:  02/06/17 1143    Specimen:  Blood Updated:  02/06/17 1155     Glucose 188 (H) mg/dL       [id number] [id number]             Imaging Results (last 24 hours)     ** No results found for the last 24 hours. **             Intake/Output Summary (Last 24 hours) at 02/06/17 1308  Last data filed at 02/06/17 1139   Gross per 24 hour   Intake   1820 ml   Output   3375 ml   Net  -1555 ml       Physical Exam    Constitutional: He is oriented to person, place, and time. He appears well-developed and well-nourished.   Up in bed, watching TV.  The patient's mother is at bedside.  Head: Normocephalic and atraumatic.   Eyes: Conjunctivae and EOM are normal. Pupils are equal, round, and reactive to light.   Neck: Neck supple. No JVD present. No thyromegaly present.   Cardiovascular: Normal rate, regular rhythm, normal heart sounds and intact distal pulses. Exam reveals no gallop and no friction rub.   No murmur heard.  Pulmonary/Chest: Effort normal. No respiratory distress. He has no wheezes. He has no rales today. He exhibits no tenderness.   Abdominal: Soft. Bowel sounds are normal. He exhibits improved distension. There is no tenderness. There is no rebound and no guarding.   Musculoskeletal: Normal range of motion. He exhibits edema continues to improve (now trace to 1+ of the bilateral lower extremities with chronic stasis dermatitis and dry skin). He exhibits no tenderness or deformity.   Neurological: He is alert and oriented to person, place, and time. He displays normal reflexes. No cranial nerve deficit. He exhibits normal muscle tone.   Skin: Skin  is warm and dry. No rash noted.   Bilateral lower extremity stasis dermatitis and very dry skin. Onychomycosis of the nails.   Psychiatric: He has a normal mood and affect. His behavior is normal. Judgment and thought content normal.      Results Review:  I have reviewed the labs, radiology results, and diagnostic studies since my last progress note and made treatment changes reflective of the results.   I have reviewed the current medications.    Assessment/Plan     Hospital Problem List     * (Principal)Acute on chronic combined systolic and diastolic congestive heart failure    COPD (chronic obstructive pulmonary disease)    Dyslipidemia    Obstructive sleep apnea syndrome    Morbid obesity    Chronic anticoagulation    Overview Signed 2/3/2017  9:15 AM by MATEO Lo     Xarelto         Tobacco abuse    Paroxysmal atrial fibrillation    Megaloblastic anemia    History of alcohol abuse    Microscopic hematuria    Bradycardia    Nonischemic cardiomyopathy    Overview Signed 2/3/2017  9:39 AM by MATEO Lo     Last [facility] 2014          New onset type 2 diabetes mellitus      1. Acute on chronic diastolic heart failure, EF 55%.   2. Moderate pulmonary hypertension.   3. Lower extremity edema and dyspnea.  4. Paroxysmal atrial fibrillation on chronic anticoagulation with Xarelto.   5. Bradycardia, asymptomatic at present.  6. Relative hypotension, improved.  7. Microscopic hematuria with non-obstructing nephrolithiasis.  8. 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   9. Ongoing tobacco abuse with underlying COPD.  10. DONTE on home CPAP.   11. Type II diabetes mellitus with a hemoglobin A1c of 8.8, new diagnosis.  12. Pulmonary nodule, will require outpatient followup.   13. Hypomagnesmia.   14. Slight hypokalemia.   15. Slight hyponatremia.     PLAN:    Probable discharge in a.m.  Continue oral potassium supplementation  One IV run potassium 20  mEq    Selvin Garcia DO   02/06/17   1:08 PM

## 2017-02-06 NOTE — THERAPY DISCHARGE NOTE
Acute Care - Physical Therapy Initial Eval/Discharge  [facility]     Patient Name: Donte Bishop  : 1965  MRN: [id number]  Today's Date: 2017   Onset of Illness/Injury or Date of Surgery Date: 17  Date of Referral to PT: 17  Referring Physician: Dr. Galo      Admit Date: 2017    Visit Dx:    ICD-10-CM ICD-9-CM   1. Systolic congestive heart failure, unspecified congestive heart failure chronicity I50.20 428.20     428.0   2. Decreased activities of daily living (ADL) Z78.9 V49.89   3. Decreased activity tolerance R68.89 780.99     Patient Active Problem List   Diagnosis   • COPD (chronic obstructive pulmonary disease)   • Acute on chronic combined systolic and diastolic congestive heart failure   • Dyslipidemia   • Obstructive sleep apnea syndrome   • Morbid obesity   • Chronic anticoagulation   • Tobacco abuse   • Paroxysmal atrial fibrillation   • Megaloblastic anemia   • History of alcohol abuse   • Microscopic hematuria   • Bradycardia   • Nonischemic cardiomyopathy   • New onset type 2 diabetes mellitus     Past Medical History   Diagnosis Date   • Alcohol abuse, in remission      Quit 2016   • Anemia    • Arthritis    • Atrial fibrillation    • Cardiomegaly    • CHF (NYHA class I, ACC/AHA stage B)    • Chronic anticoagulation      Xarelto    • COPD (chronic obstructive pulmonary disease)    • DVT (deep venous thrombosis)    • New onset type 2 diabetes mellitus 2/3/2017   • Obesity    • S/P cardiac cath    • Severe left ventricular systolic dysfunction    • Sleep apnea    • Tobacco abuse      1/2 PPD      Past Surgical History   Procedure Laterality Date   • Eye surgery     • Fracture surgery     • Fracture surgery Left      left arm. has screws  and pins   • Cardiac catheterization       Nonischemic Cardiomyopathy           PT ASSESSMENT (last 72 hours)      PT Evaluation       17 1053 17 1042    Rehab Evaluation    Document Type evaluation   See PRIMITIVO TURNER (r)  COLE (t) EVANGELISTA (c) evaluation   See MAR  -TR    Subjective Information agree to therapy;complains of;dyspnea;numbness;swelling;weakness   numbness/swelling in B feet/legs  -EVANGELISTA (r) COLE (t) EVANGELISTA (c) agree to therapy;complains of;weakness;dyspnea;numbness;swelling   Numbness and swelling B feet and legs  -TR    Patient Effort, Rehab Treatment  good  -TR    Symptoms Noted During/After Treatment  other (see comments)  -TR    Symptoms Noted Comment  O2 decreased to 86% with mobility. RN informed.   -TR    General Information    Patient Profile Review yes  -EVANGELISTA (r) COLE (t) EVANGELISTA (c) yes  -TR    Onset of Illness/Injury or Date of Surgery Date 02/02/17  -EVANGELISTA (r) COLE (t) EVANGELISTA (c) 02/02/17  -TR    Referring Physician Dr. Galo  -EVANGELISTA (r) COLE (t) EVANGELISTA (c) Dr. Galo   weakness  -TR    General Observations Long sitting in bed, on 2 L O2/NC, alert, oriented, mother present  -EVANGELISTA (r) COLE (t) EVANGELISTA (c) Seated EOB, alert, oriented, O2 NC 2L, telemetry, pts mother present.  -TR    Pertinent History Of Current Problem Admitted with cough, SOB, LE edema since Wallace, A/C CHF, hypotension, microscopic hematuria, megloblastic anemia, 2/2 CXR: pulmonary edema, 2/4 CT abd/pelvis: nodular density at L lung base, diverticulosis.   -EVANGELISTA (r) COLE (t) EVAGNELISTA (c) Admitted with cough, SOB, LE edema since Lyubov, A/C CHF, hypotension, microscopic hematuria, megloblastic anemia, 2/2 CXR: pulmonary edema, 2/4 CT abd/pelvis: nodular density at L lung base, diverticulosis.   -TR    Precautions/Limitations fall precautions;oxygen therapy device and L/min  -EVANGELISTA (r) COLE (t) EVANGELISTA (c) fall precautions;oxygen therapy device and L/min  -TR    Prior Level of Function independent:;all household mobility;gait;ADL's;driving   short distances for gait, no AD  -EVANGELISTA (r)  EVANGELISTA (c) independent:;all household mobility;community mobility;ADL's;driving   walked short distances  -TR    Equipment Currently Used at Home shower chair;respiratory supplies;cane, straight   BiPAP  -EVANGELISTA martinez) COLE Erazot) EVANGELISTA marti)  respiratory supplies;shower chair;oxygen;cane, straight   Bi-pap  -TR    Plans/Goals Discussed With patient;agreed upon  -EVANGELISTA (r) SM (t) EVANGELISTA (c) patient;agreed upon  -TR    Risks Reviewed patient:;LOB;dizziness;increased discomfort;change in vital signs  -EVANGELISTA (r) SM (t) EVANGELISTA (c) patient:;LOB;dizziness;increased discomfort;change in vital signs  -TR    Benefits Reviewed patient:;improve function;increase independence;increase strength;increase balance;increase knowledge  -EVANGELISTA (r) SM (t) EVANGELISTA (c) patient:;improve function;increase independence;increase strength;increase balance;increase knowledge  -TR    Barriers to Rehab previous functional deficit  -EVANGELISTA (r) SM (t) EVANGELISTA (c) previous functional deficit  -TR    Living Environment    Lives With parent(s)  -EVANGELISTA (r) SM (t) EVANGELISTA (c) parent(s)  -TR    Living Arrangements house  -EVANGELISTA (r) SM (t) EVANGELISTA (c) house  -TR    Home Accessibility ramps present at home;stairs (1 railing present);stairs to enter home;bed and bath on same level   uses steps down, ramp up; walk in shower  -EVANGELISTA (r) SM (t) EVANGELISTA (c) ramps present at home;stairs to enter home  -TR    Number of Stairs to Enter Home 4  -EVANGELISTA (r) SM (t) EVANGELISTA (c) 4  -TR    Stair Railings at Home outside, present on right side  -EVANGELISTA (r) SM (t) EVANGELISTA (c) outside, present on right side  -TR    Living Environment Comment  walk in shower  -TR    Clinical Impression    Date of Referral to PT 02/04/17  -EVANGELISTA (r) SM (t) EVANGELISTA (c)     PT Diagnosis decreased endurance  -EVANGELISTA (r) SM (t) EVANGELISTA (c)     Functional Level At Time Of Evaluation Conditionally independent with bed mobility. Independent with standing transfers. Supervision for gait ~ 30 ft due to O2 levels.  -EVANGELISTA (r) SM (t) EVANGELISTA (c)     Patient/Family Goals Statement return home  -EVANGELISTA (r) SM (t) EVANGELISTA (c)     Criteria for Skilled Therapeutic Interventions Met no;current level of function same as previous level of function;patient/family declined skilled intervention at this time  -EVANGELISTA     Rehab Potential other (see comments)    1-time PT eval  -EVANGELISTA (r) SM (t) EVANGELISTA (c)     Predicted Duration of Therapy Intervention (days/wks) 1-time PT eval  -EVANGELISTA (r) SM (t) EVANGELISTA (c)     Vital Signs    Pretreatment Heart Rate (beats/min) 74  -EVANGELISTA (r) SM (t) EVANGELISTA (c)     Posttreatment Heart Rate (beats/min) 75  -EVANGELISTA (r) SM (t) EVANGELISTA (c)     Pre SpO2 (%) 89  -EVANGELISTA (r) SM (t) EVANGELISTA (c) 89  -TR    O2 Delivery Pre Treatment supplemental O2   2L O2/NC  -EVANGELISTA (r) SM (t) EVANGELISTA (c) supplemental O2   2L NC  -TR    Intra SpO2 (%) 86  -EVANGELISTA (r) SM (t) EVANGELISTA (c) 86  -TR    O2 Delivery Intra Treatment room air   notified RN of SpO2  -EVANGELISTA (r) SM (t) EVANGELISTA (c) room air   RN informed of O2 readings  -TR    Post SpO2 (%) 89  -EVANGELISTA (r) SM (t) EVANGELISTA (c) 89  -TR    O2 Delivery Post Treatment supplemental O2   2 L O2/NC  -EVANGELISTA (r) SM (t) EVANGELISTA (c) supplemental O2   2L NC  -TR    Pre Patient Position Supine  -EVANGELISTA (r) SM (t) EVANGELISTA (c) Supine  -TR    Intra Patient Position Standing  -EVANGELISTA (r) SM (t) EVANGELISTA (c) Standing  -TR    Post Patient Position Sitting  -EVANGELISTA (r) SM (t) EVANGELISTA (c) Sitting  -TR    Pain Assessment    Pain Assessment No/denies pain  -EVANGELISTA (r) SM (t) EVANGELISTA (c) No/denies pain  -TR    Vision Assessment/Intervention    Visual Impairment WFL with corrective lenses   reading glasses  -EVANGELISTA (r) SM (t) EVANGELISTA (c) WFL with corrective lenses   Reading glasses  -TR    Cognitive Assessment/Intervention    Current Cognitive/Communication Assessment functional  -EVANGELISTA (r) SM (t) EVANGELISTA (c) functional  -TR    Orientation Status oriented x 4  -EVANGELISTA (r) SM (t) EVANGELISTA (c) oriented x 4  -TR    Follows Commands/Answers Questions able to follow multi-step instructions;100% of the time  -EVANGELISTA (r) SM (t) EVANGELISTA (c) able to follow multi-step instructions;100% of the time  -TR    Personal Safety WNL/WFL  -EVANGELISTA (r) SM (t) EVANGELISTA (c) WNL/WFL  -TR    Personal Safety Interventions fall prevention program maintained;gait belt;muscle strengthening facilitated;nonskid shoes/slippers when out of bed;supervised activity  -EVANGELISTA (r) COLE (t) EVANGELISTA (c) gait belt;fall prevention program  maintained;nonskid shoes/slippers when out of bed;supervised activity  -TR    ROM (Range of Motion)    General ROM Detail B LE AROM WFL. See OT IE for UE.  -EVANGELISTA (r) SM (t) EVANGELISTA (c) B UE AROM WFL except L hand due to arthritis.   -TR    MMT (Manual Muscle Testing)    General MMT Assessment Detail B hip flexors 5/5, B quads 5/5, B hamstrings 4+/5, B ankle dorsiflexors 4/5. B ankles limited by pain in feet with pressure due to edema. See OT IE for UE.  -EVANGELISTA (r) SM (t) EVANGELSITA (c) B UE grossly 4/5  -TR    Bed Mobility, Assessment/Treatment    Bed Mobility, Assistive Device bed rails;head of bed elevated  -EVANGELISTA (r) SM (t) EVANGELISTA (c) bed rails;head of bed elevated  -TR    Bed Mob, Supine to Sit, [place] conditional independence  -EVANGELISTA (r) SM (t) EVANGELISTA (c) conditional independence  -TR    Bed Mob, Sit to Supine, [place] conditional independence  -EVANGELISTA (r) SM (t) EVANGELISTA (c) conditional independence  -TR    Transfer Assessment/Treatment    Transfers, Sit-Stand [place] independent  -EVANGELISTA (r) SM (t) EVANGELISTA (c) independent  -TR    Transfers, Stand-Sit [place] independent  -EVANGELISTA (r) SM (t) EVANGELISTA (c) independent  -TR    Gait Assessment/Treatment    Gait, [place] Level supervision required  -EVANGELISTA (r) SM (t) EVANGELISTA (c)     Gait, Distance (Feet) 30  -EVANGELISTA (r) SM (t) EVANGELISTA (c)     Gait, Gait Pattern Analysis swing-through gait  -EVANGELISTA (r) SM (t) EVANGELISTA (c)     Gait, Gait Deviations trinity decreased  -EVANGELISTA (r) SM (t) EVANGELISTA (c)     Gait, Impairments sensation decreased   neuropathy in feet per pt.  -EVANGELISTA (r) SM (t) EVANGELISTA (c)     Gait, Comment Ambulates short distances at home, pt. states he is at his baseline mobility and has no further PT needs.  -EVANGELISTA     Motor Skills/Interventions    Additional Documentation Balance Skills Training (Group)  -EVANGELISTA (r) SM (t) EVANGELISTA (c) Balance Skills Training (Group)  -TR    Balance Skills Training    Sitting-Level of Assistance Independent  -EVANGELISTA (r) SM (t) EVANGELISTA (c) Independent  -TR    Sitting-Balance Support Feet supported  -EVANGELISTA (r) SM (t) EVANGELISTA (c)  Feet supported  -TR    Standing-Level of Assistance Independent  -EVANGELISTA (r) SM (t) EVANGELISTA (c) Independent  -TR    Static Standing Balance Support No upper extremity supported  -EVANGELISTA (r) SM (t) EVANGELISTA (c) No upper extremity supported  -TR    Gait Balance-Level of Assistance Close supervision  -EVANGELISTA     Gait Balance Support No upper extremity supported  -EVANGELISTA     Sensory Assessment/Intervention    Sensory Impairment --   light touch WNL per pt. in B LE, chronic numbness per pt.  -EVANGELISTA (r) SM (t) EVANGELISTA (c) numbness   B LE per pt, chronic  -TR    Edema Management    Edema Amount --   B LE edema  -EVANGELISTA (r) SM (t) EVANGELISTA (c) --   B LE edema   -TR    Skin/Derm Type dry;crust;shiny   B feet dry/crust, B lower legs shiny  -EVANGELISTA (r) SM (t) EVANGELISTA (c)     Positioning and Restraints    Pre-Treatment Position in bed  -EVANGELISTA (r) SM (t) EVANGELISTA (c) in bed  -TR    Post Treatment Position bed  -EVANGELISTA (r) SM (t) EVANGELISTA (c) bed  -TR    In Bed sitting EOB;call light within reach;encouraged to call for assist;side rails up x2  -EVANGELISTA (r) SM (t) EVANGELISTA (c) sitting EOB;call light within reach;encouraged to call for assist;side rails up x2  -TR      User Key  (r) = Recorded By, (t) = Taken By, (c) = Cosigned By    Initials Name Provider Type    EVANGELISTA Miller, PT DPT Physical Therapist    LUISA Moore, OTR/L Occupational Therapist     Shawna Mulvihill, PT Student PT Student          Physical Therapy Education     Title: PT OT SLP Therapies (Active)     Topic: Physical Therapy (Active)     Point: Mobility training (Done)    Learning Progress Summary    Learner Readiness Method Response Comment Documented by Status   Patient Acceptance E VU Educated pt. on benefits of activity and option of requesting PT consult in future if he decides he would benefit from skilled PT intervention.  02/06/17 1053 Done                      User Key     Initials Effective Dates Name Provider Type Discipline     10/21/16 -  Shawna Mulvihill, PT Student PT Student PT                PT  Recommendation and Plan  Anticipated Discharge Disposition: home with assist  Planned Therapy Interventions: other (see comments) (1-time PT aristeo)  PT Frequency: evaluation only  Plan of Care Review  Plan Of Care Reviewed With: patient  Outcome Summary/Follow up Plan: PT aristeo completed. Pt. was independent with bed mobility and transfers. Pt. ambulated ~ 30 ft. with supervision to monitor SpO2. Pt. reported he is at baseline for mobility and denies the need for PT services at this time. Anticipated d/c to home with assist. Re-consult PT if needed.              Outcome Measures       02/06/17 1053 02/06/17 1042       How much help from another person do you currently need...    Turning from your back to your side while in flat bed without using bedrails? 4  -EVANGELISTA (r) SM (t) EVANGELISTA (c)      Moving from lying on back to sitting on the side of a flat bed without bedrails? 4  -EVANGELISTA (r) SM (t) EVANGELISTA (c)      Moving to and from a bed to a chair (including a wheelchair)? 4  -EVANGELISTA (r) SM (t) EVANGELISTA (c)      Standing up from a chair using your arms (e.g., wheelchair, bedside chair)? 4  -EVANGELISTA (r) SM (t) EVANGELISTA (c)      Climbing 3-5 steps with a railing? 3  -EVANGELISTA (r) SM (t) EVANGELISTA (c)      To walk in hospital room? 3  -EVANGELISTA (r) SM (t) EVANGELISTA (c)      AM-PAC 6 Clicks Score 22  -EVANGELISTA (r) SM (t)      How much help from another is currently needed...    Putting on and taking off regular lower body clothing?  2   Pt reports [place]ila with adaptive techniques at home  -TR     Bathing (including washing, rinsing, and drying)  4   with AE  -TR     Toileting (which includes using toilet bed pan or urinal)  4  -TR     Putting on and taking off regular upper body clothing  4  -TR     Taking care of personal grooming (such as brushing teeth)  4  -TR     Eating meals  4  -TR     Score  22  -TR     Functional Assessment    Outcome Measure Options AM-PAC 6 Clicks Basic Mobility (PT)  -EVANGELISTA (r) SM (t) EVANGELISTA (c) AM-PAC 6 Clicks Daily Activity (OT)  -TR       User Key  (r) = Recorded  By, (t) = Taken By, (c) = Cosigned By    Initials Name Provider Type    EVANGELISTA Miller, PT DPT Physical Therapist    LUISA Moore, OTR/L Occupational Therapist    SM Shawna Mulvihill, PT Student PT Student           Time Calculation:         PT Charges       02/06/17 1213          Time Calculation    Start Time 1053  -EVANGELISTA (r) SM (t) EVANGELISTA (c)      Stop Time 1125  -EVANGELISTA (r) SM (t) EVANGELISTA (c)      Time Calculation (min) 32 min  -EVANGELISTA (r) SM (t)      PT Received On 02/06/17  -EVANGELISTA (r) SM (t) EVANGELISTA (c)      PT Goal Re-Cert Due Date 02/16/17  -EVANGELISTA (r) SM (t) EVANGELISTA (c)        User Key  (r) = Recorded By, (t) = Taken By, (c) = Cosigned By    Initials Name Provider Type    EVANGELISTA Miller, PT DPT Physical Therapist    SM Shawna Mulvihill, PT Student PT Student          Therapy Charges for Today     Code Description Service Date Service Provider Modifiers Qty    81580717769 HC PT EVAL LOW COMPLEXITY 2 2/6/2017 Shawna Mulvihill, PT Student GP, KX 1          PT G-Codes  Outcome Measure Options: AM-PAC 6 Clicks Basic Mobility (PT)  Score: 22  Functional Limitation: Mobility: Walking and moving around  Mobility: Walking and Moving Around Current Status (): At least 20 percent but less than 40 percent impaired, limited or restricted  Mobility: Walking and Moving Around Goal Status (): At least 20 percent but less than 40 percent impaired, limited or restricted  Mobility: Walking and Moving Around Discharge Status (): At least 20 percent but less than 40 percent impaired, limited or restricted    PT Discharge Summary  Anticipated Discharge Disposition: home with assist  Reason for Discharge: (P) At baseline function  Outcomes Achieved: (P)  (1-time PT eval. Pt. at baseline and denies any concerns.)  Discharge Destination: (P) Home with assist    Shawna Mulvihill, PT Student  2/6/2017

## 2017-02-06 NOTE — PLAN OF CARE
Problem: Patient Care Overview (Adult)  Goal: Plan of Care Review  Outcome: Ongoing (interventions implemented as appropriate)    02/06/17 1053   Coping/Psychosocial Response Interventions   Plan Of Care Reviewed With patient   Outcome Evaluation   Outcome Summary/Follow up Plan PT eval completed. Pt. was independent with bed mobility and transfers. Pt. ambulated ~ 30 ft. with supervision to monitor SpO2. Pt. reported he is at baseline for mobility and denies the need for PT services at this time. Anticipated d/c to home with assist. Re-consult PT if needed.

## 2017-02-06 NOTE — THERAPY DISCHARGE NOTE
Acute Care - Occupational Therapy Initial Eval/Discharge  [facility]     Patient Name: Donte Bishop  : 1965  MRN: [id number]  Today's Date: 2017  Onset of Illness/Injury or Date of Surgery Date: 17  Date of Referral to OT: 17  Referring Physician: Dr. Galo (weakness)      Admit Date: 2017       ICD-10-CM ICD-9-CM   1. Systolic congestive heart failure, unspecified congestive heart failure chronicity I50.20 428.20     428.0   2. Decreased activities of daily living (ADL) Z78.9 V49.89     Patient Active Problem List   Diagnosis   • COPD (chronic obstructive pulmonary disease)   • Acute on chronic combined systolic and diastolic congestive heart failure   • Dyslipidemia   • Obstructive sleep apnea syndrome   • Morbid obesity   • Chronic anticoagulation   • Tobacco abuse   • Paroxysmal atrial fibrillation   • Megaloblastic anemia   • History of alcohol abuse   • Microscopic hematuria   • Bradycardia   • Nonischemic cardiomyopathy   • New onset type 2 diabetes mellitus     Past Medical History   Diagnosis Date   • Alcohol abuse, in remission      Quit 2016   • Anemia    • Arthritis    • Atrial fibrillation    • Cardiomegaly    • CHF (NYHA class I, ACC/AHA stage B)    • Chronic anticoagulation      Xarelto    • COPD (chronic obstructive pulmonary disease)    • DVT (deep venous thrombosis)    • New onset type 2 diabetes mellitus 2/3/2017   • Obesity    • S/P cardiac cath    • Severe left ventricular systolic dysfunction    • Sleep apnea    • Tobacco abuse      1/2 PPD      Past Surgical History   Procedure Laterality Date   • Eye surgery     • Fracture surgery     • Fracture surgery Left      left arm. has screws  and pins   • Cardiac catheterization       Nonischemic Cardiomyopathy           OT ASSESSMENT FLOWSHEET (last 72 hours)      OT Evaluation       17 1142 17 1053 17 1042          Rehab Evaluation    Document Type  (P)  evaluation   See MAR  -SM evaluation    See MAR  -TR      Subjective Information  (P)  agree to therapy;complains of;fatigue;dyspnea;numbness;swelling   numbness/swelling in B feet/legs  -SM agree to therapy;complains of;weakness;dyspnea;numbness;swelling   Numbness and swelling B feet and legs  -TR      Patient Effort, Rehab Treatment   good  -TR      Symptoms Noted During/After Treatment   other (see comments)  -TR      Symptoms Noted Comment   O2 decreased to 86% with mobility. RN informed.   -TR      General Information    Patient Profile Review   yes  -TR      Onset of Illness/Injury or Date of Surgery Date   02/02/17  -TR      Referring Physician   Dr. Galo   weakness  -TR      General Observations   Seated EOB, alert, oriented, O2 NC 2L, telemetry, pts mother present.  -TR      Pertinent History Of Current Problem   Admitted with cough, SOB, LE edema since Christmas, A/C CHF, hypotension, microscopic hematuria, megloblastic anemia, 2/2 CXR: pulmonary edema, 2/4 CT abd/pelvis: nodular density at L lung base, diverticulosis.   -TR      Precautions/Limitations   fall precautions;oxygen therapy device and L/min  -TR      Prior Level of Function  (P)  independent:;all household mobility;gait;ADL's;driving   short distances for gait, no AD  - independent:;all household mobility;community mobility;ADL's;driving   walked short distances  -TR      Equipment Currently Used at Home  (P)  shower chair;respiratory supplies;cane, straight   BiPAP  - respiratory supplies;shower chair;oxygen;cane, straight   Bi-pap  -TR      Plans/Goals Discussed With   patient;agreed upon  -TR      Risks Reviewed   patient:;LOB;dizziness;increased discomfort;change in vital signs  -TR      Benefits Reviewed   patient:;improve function;increase independence;increase strength;increase balance;increase knowledge  -TR      Barriers to Rehab   previous functional deficit  -TR      Living Environment    Lives With  (P)  parent(s)  - parent(s)  -TR      Living Arrangements   (P)  house  -SM house  -TR      Home Accessibility  (P)  ramps present at home;stairs (1 railing present);stairs to enter home;bed and bath on same level   uses steps down, ramp up; walk in shower  - ramps present at home;stairs to enter home  -TR      Number of Stairs to Enter Home  (P)  4  -SM 4  -TR      Stair Railings at Home  (P)  outside, present on right side  - outside, present on right side  -TR      Living Environment Comment   walk in shower  -TR      Clinical Impression    Date of Referral to OT   02/04/17  -TR      OT Diagnosis   Decreased ADL  -TR      Impairments Found (describe specific impairments)   aerobic capacity/endurance;ventilation and respiration/gas exchange  -TR      Patient/Family Goals Statement   Return home to live with mother.  -TR      Criteria for Skilled Therapeutic Interventions Met   no;current level of function same as previous level of function  -TR      Therapy Frequency   evaluation only  -TR      Predicted Duration of Therapy Intervention (days/wks)   One time visit  -TR      Anticipated Equipment Needs At Discharge   --   None  -TR      Anticipated Discharge Disposition home with assist  -TR  home with assist  -TR      Vital Signs    Pre SpO2 (%)   89  -TR      O2 Delivery Pre Treatment   supplemental O2   2L NC  -TR      Intra SpO2 (%)   86  -TR      O2 Delivery Intra Treatment   room air   RN informed of O2 readings  -TR      Post SpO2 (%)   89  -TR      O2 Delivery Post Treatment   supplemental O2   2L NC  -TR      Pre Patient Position   Supine  -TR      Intra Patient Position   Standing  -TR      Post Patient Position   Sitting  -TR      Pain Assessment    Pain Assessment  (P)  No/denies pain  - No/denies pain  -TR      Vision Assessment/Intervention    Visual Impairment  (P)  WFL with corrective lenses  - WFL with corrective lenses   Reading glasses  -TR      Cognitive Assessment/Intervention    Current Cognitive/Communication Assessment  (P)  functional  -  functional  -TR      Orientation Status  (P)  oriented x 4  -SM oriented x 4  -TR      Follows Commands/Answers Questions   able to follow multi-step instructions;100% of the time  -TR      Personal Safety   WNL/WFL  -TR      Personal Safety Interventions   gait belt;fall prevention program maintained;nonskid shoes/slippers when out of bed;supervised activity  -TR      ROM (Range of Motion)    General ROM Detail   B UE AROM WFL except L hand due to arthritis.   -TR      MMT (Manual Muscle Testing)    General MMT Assessment Detail   B UE grossly 4/5  -TR      Bed Mobility, Assessment/Treatment    Bed Mobility, Assistive Device   bed rails;head of bed elevated  -TR      Bed Mob, Supine to Sit, [place]   conditional independence  -TR      Bed Mob, Sit to Supine, [place]   conditional independence  -TR      Transfer Assessment/Treatment    Transfers, Sit-Stand [place]   independent  -TR      Transfers, Stand-Sit [place]   independent  -TR      Functional Mobility    Functional Mobility- Ind. Level   independent  -TR      Functional Mobility- Comment   In room and bathroom  -TR      Upper Body Dressing Assessment/Training    UB Dressing Assess/Train, Comment   Expected level: [place].   -TR      Lower Body Dressing Assessment/Training    LB Dressing Assess/Train, Clothing Type   donning:;socks  -TR      LB Dressing Assess/Train, Position   edge of bed  -TR      LB Dressing Assess/Train, [place]   maximum assist (25% patient effort)  -TR      LB Dressing Assess/Train, Impairments   decreased flexibility  -TR      LB Dressing Assess/Train, Comment   Pt reports he uses a foot support at home to don/doff LB clothing. Denies a decline in functional status.   -TR      Toileting Assessment/Training    Toileting Assess/Train, Comment   Expected level: Modified Indepenence.   -TR      Motor Skills/Interventions    Additional Documentation   Balance Skills Training (Group)  -TR      Balance Skills  Training    Sitting-Level of Assistance   Independent  -TR      Sitting-Balance Support   Feet supported  -TR      Standing-Level of Assistance   Independent  -TR      Static Standing Balance Support   No upper extremity supported  -TR      Sensory Assessment/Intervention    Sensory Impairment   numbness   B LE per pt, chronic  -TR      Edema Management    Edema Amount   --   B LE edema   -TR      General Therapy Interventions    Planned Therapy Interventions   --   One time visit  -TR      Positioning and Restraints    Pre-Treatment Position   in bed  -TR      Post Treatment Position   bed  -TR      In Bed   sitting EOB;call light within reach;encouraged to call for assist;side rails up x2  -TR        User Key  (r) = Recorded By, (t) = Taken By, (c) = Cosigned By    Initials Name Effective Dates    TR Kelli Moore, OTR/L 06/22/15 -     SM Shawna Mulvihill, PT Student 10/21/16 -           Occupational Therapy Education     Title: PT OT SLP Therapies (Resolved)     Topic: Occupational Therapy (Resolved)     Point: ADL training (Resolved)    Description: Instruct learner(s) on proper safety adaptation and remediation techniques during self care or transfers.   Instruct in proper use of assistive devices.    Learning Progress Summary    Learner Readiness Method Response Comment Documented by Status   Patient Acceptance E,D VU Education provided on purpose of OT eval, impairments found, HEP, fall prevention, safety with ADL and d/c planning. TR 02/06/17 1137 Done               Point: Home exercise program (Resolved)    Description: Instruct learner(s) on appropriate technique for monitoring, assisting and/or progressing therapeutic exercises/activities.    Learning Progress Summary    Learner Readiness Method Response Comment Documented by Status   Patient Acceptance E,D VU Education provided on purpose of OT eval, impairments found, HEP, fall prevention, safety with ADL and d/c planning. TR 02/06/17 1137 Done                Point: Precautions (Resolved)    Description: Instruct learner(s) on prescribed precautions during self-care and functional transfers.    Learning Progress Summary    Learner Readiness Method Response Comment Documented by Status   Patient Acceptance E,D VU Education provided on purpose of OT eval, impairments found, HEP, fall prevention, safety with ADL and d/c planning. TR 02/06/17 1137 Done               Point: Body mechanics (Resolved)    Description: Instruct learner(s) on proper positioning and spine alignment during self-care, functional mobility activities and/or exercises.    Learning Progress Summary    Learner Readiness Method Response Comment Documented by Status   Patient Acceptance E,D VU Education provided on purpose of OT eval, impairments found, HEP, fall prevention, safety with ADL and d/c planning. TR 02/06/17 1137 Done                      User Key     Initials Effective Dates Name Provider Type Discipline    TR 06/22/15 -  Kelli Moore, OTR/L Occupational Therapist OT                OT Recommendation and Plan  Anticipated Equipment Needs At Discharge:  (None)  Anticipated Discharge Disposition: home with assist  Planned Therapy Interventions:  (One time visit)  Therapy Frequency: evaluation only  Plan of Care Review  Plan Of Care Reviewed With: patient  Progress: improving  Outcome Summary/Follow up Plan: OT Eval completed. Pt is independent with all mobility and Modified independent with ADL tasks. He reports he is back to his baseline functional status with ADL and mobility. He denies the need for OT services at this time. Anticipated d/c is home with family.                Outcome Measures       02/06/17 1042          How much help from another is currently needed...    Putting on and taking off regular lower body clothing? 2   Pt reports [place] with adaptive techniques at home  -TR      Bathing (including washing, rinsing, and drying) 4   with AE  -TR      Toileting  (which includes using toilet bed pan or urinal) 4  -TR      Putting on and taking off regular upper body clothing 4  -TR      Taking care of personal grooming (such as brushing teeth) 4  -TR      Eating meals 4  -TR      Score 22  -TR      Functional Assessment    Outcome Measure Options AM-PAC 6 Clicks Daily Activity (OT)  -TR        User Key  (r) = Recorded By, (t) = Taken By, (c) = Cosigned By    Initials Name Provider Type    LUISA Moore OTR/L Occupational Therapist          Time Calculation:         Time Calculation- OT       02/06/17 1141          Time Calculation- OT    OT Start Time 1042  -TR      OT Stop Time 1127  -TR      OT Time Calculation (min) 45 min  -TR      OT Received On 02/06/17  -TR        User Key  (r) = Recorded By, (t) = Taken By, (c) = Cosigned By    Initials Name Provider Type    LUISA Moore OTR/L Occupational Therapist          Therapy Charges for Today     Code Description Service Date Service Provider Modifiers Qty    38638267658 HC OT SELFCARE CURRENT 2/6/2017 Kelli Moore OTR/L GO, CJ 1    80212406825 HC OT SELFCARE PROJECTED 2/6/2017 Kelli Moore OTR/L GO, CJ 1    24082794809 HC OT SELFCARE DISCHARGE 2/6/2017 Kelli Moore OTR/L GO, CJ 1    37374332905 HC OT EVAL MOD COMPLEXITY 3 2/6/2017 Kelli Moore OTR/L GO, KX 1          OT G-codes  OT Professional Judgement Used?: Yes  OT Functional Scales Options: AM-PAC 6 Clicks Daily Activity (OT)  Score: 22  Functional Limitation: Self care  Self Care Current Status (): At least 20 percent but less than 40 percent impaired, limited or restricted  Self Care Goal Status (): At least 20 percent but less than 40 percent impaired, limited or restricted  Self Care Discharge Status (): At least 20 percent but less than 40 percent impaired, limited or restricted    OT Discharge Summary  Anticipated Discharge Disposition: home with assist  Reason for Discharge: At baseline function  Outcomes  Achieved: Other (Eval only)  Discharge Destination: Home with assist    Kelli Moore, OTR/KASEY  2/6/2017

## 2017-02-06 NOTE — PROGRESS NOTES
[facility] -  Progress Note     LOS: 4 days   Patient Care Team:  No Known Provider as PCP - General      Reason for consultation: CHF     Subjective     Interval History:     Patient feels that he needs some rest- he has not been able to rest since starting the Bumex gtt due to frequent urination. He is feeling significantly better since start Bumex gtt.      Review of Systems:   Review of Systems   Constitution: Positive for weakness and malaise/fatigue. Negative for diaphoresis and fever.   Cardiovascular: Positive for dyspnea on exertion and leg swelling (improving). Negative for chest pain, irregular heartbeat, near-syncope, palpitations, paroxysmal nocturnal dyspnea and syncope.   Respiratory: Positive for shortness of breath (improving) and sputum production (white). Negative for wheezing.    Skin: Negative for rash.   Musculoskeletal: Negative for falls.   Gastrointestinal: Negative for bloating, abdominal pain, nausea and vomiting.   Psychiatric/Behavioral: Negative for altered mental status.        Objective     Vital Sign Min/Max for last 24 hours  Temp  Min: 97 °F (36.1 °C)  Max: 98.5 °F (36.9 °C)   BP  Min: 110/52  Max: 139/83   Pulse  Min: 70  Max: 91   Resp  Min: 16  Max: 20   SpO2  Min: 91 %  Max: 92 %   Flow (L/min)  Min: 2  Max: 3   Weight  Min: 372 lb (169 kg)  Max: 372 lb 9.6 oz (169 kg)     Last Weight    02/06/17  0938   Weight: (!) 372 lb (169 kg)         Intake/Output Summary (Last 24 hours) at 02/06/17 1312  Last data filed at 02/06/17 1139   Gross per 24 hour   Intake   1820 ml   Output   3375 ml   Net  -1555 ml         Physical Exam:  Physical Exam   Constitutional: He is oriented to person, place, and time. He appears well-developed and well-nourished. He is cooperative. No distress. Nasal cannula in place.   Resting comfortably in bed. Obese.    HENT:   Head: Normocephalic and atraumatic.   Cardiovascular: Normal rate, S2 normal and intact distal pulses.  An  irregularly irregular rhythm present. Frequent extrasystoles are present. Exam reveals distant heart sounds.    No murmur heard.  Telemetry: A-Fib up to107 BPM, no pauses or bradycardia  overnight or this morning, multifocal PVCs, couplets, trigeminy.    Pulmonary/Chest: Effort normal and breath sounds normal. No accessory muscle usage. No respiratory distress.   Abdominal: Soft. Bowel sounds are normal. He exhibits no distension. There is no tenderness.   Musculoskeletal: He exhibits edema (2+ BLE pitting edema- improved compared to yesterday).   Neurological: He is alert and oriented to person, place, and time.   Skin: Skin is warm and dry. No rash noted. He is not diaphoretic.   Psychiatric: He has a normal mood and affect. His speech is normal and behavior is normal.   Vitals reviewed.       Results Review:   Lab Results   Component Value Date    WBC 5.85 02/03/2017    HGB 16.2 02/03/2017    HCT 52.1 (H) 02/03/2017    .7 (H) 02/03/2017     (L) 02/03/2017     Lab Results   Component Value Date    GLUCOSE 198 (H) 02/06/2017    CALCIUM 8.7 02/06/2017     (L) 02/06/2017    K 3.1 (L) 02/06/2017    CO2 38.0 (H) 02/06/2017    CL 85 (L) 02/06/2017    BUN 24 (H) 02/06/2017    CREATININE 1.21 02/06/2017    EGFRIFNONA 63 02/06/2017    BCR 19.8 02/06/2017    ANIONGAP 8.0 02/06/2017        Echo EF Estimated  Lab Results   Component Value Date    ECHOEFEST 55 02/03/2017       Medication Review: yes  Current Facility-Administered Medications   Medication Dose Route Frequency Provider Last Rate Last Dose   • acetaminophen (TYLENOL) tablet 650 mg  650 mg Oral Q4H PRN Will Galo DO       • acetaZOLAMIDE (DIAMOX) tablet 250 mg  250 mg Oral BID Will Galo DO   250 mg at 02/06/17 0908   • budesonide-formoterol (SYMBICORT) 160-4.5 MCG/ACT inhaler 2 puff  2 puff Inhalation BID - RT Will Galo DO   2 puff at 02/06/17 0659   • bumetanide (BUMEX) tablet 1 mg  1 mg Oral BID Will Galo DO   1 mg at  02/06/17 0908   • colchicine tablet 0.6 mg  0.6 mg Oral Daily Will Galo DO   0.6 mg at 02/06/17 0908   • dextrose (D50W) solution 25 g  25 g Intravenous Q15 Min PRN Will Galo DO       • dextrose (GLUTOSE) oral gel 15 g  15 g Oral Q15 Min PRN Will Galo DO       • glucagon (human recombinant) (GLUCAGEN DIAGNOSTIC) injection 1 mg  1 mg Subcutaneous Q15 Min PRN Will Galo DO       • HYDROcodone-acetaminophen (NORCO) 5-325 MG per tablet 1 tablet  1 tablet Oral Q6H PRN Will Galo DO   1 tablet at 02/05/17 1712   • insulin lispro (humaLOG) injection 2-7 Units  2-7 Units Subcutaneous 4x Daily AC & at Bedtime Will Galo DO   2 Units at 02/06/17 0907   • lisinopril (PRINIVIL,ZESTRIL) tablet 5 mg  5 mg Oral Q24H Will Galo DO   5 mg at 02/06/17 0908   • magnesium oxide (MAGOX) tablet 400 mg  400 mg Oral BID Will Galo DO   400 mg at 02/06/17 0908   • metFORMIN (GLUCOPHAGE) tablet 500 mg  500 mg Oral BID With Meals Will Galo DO   500 mg at 02/06/17 0908   • ondansetron (ZOFRAN) injection 4 mg  4 mg Intravenous Q6H PRN Will Galo DO       • potassium chloride (MICRO-K) CR capsule 40 mEq  40 mEq Oral BID With Meals Will Galo DO   40 mEq at 02/06/17 0908   • potassium chloride 20 mEq in 100 mL IVPB  20 mEq Intravenous Once Selvin Garcia, DO       • rivaroxaban (XARELTO) tablet 20 mg  20 mg Oral Daily Will Galo DO   20 mg at 02/06/17 0908   • sodium chloride 0.9 % flush 1-10 mL  1-10 mL Intravenous PRN Will Galo DO       • sodium chloride 0.9 % flush 10 mL  10 mL Intravenous PRN MATEO Carbajal       • spironolactone (ALDACTONE) tablet 50 mg  50 mg Oral Daily Will Galo DO   50 mg at 02/06/17 1034   • tiotropium (SPIRIVA) 18 MCG per inhalation capsule 1 capsule  1 capsule Inhalation Daily - RT Will Galo,    1 capsule at 02/06/17 0652         Assessment/Plan     Principal Problem:    Acute on chronic combined systolic and diastolic  congestive heart failure- significant diuresis since starting Bumex gtt.     Active Problems:    Paroxysmal atrial fibrillation, rate controlled. No pauses or bradycardia overnight.     Nonischemic cardiomyopathy    Chronic anticoagulation with Xarelto     Microscopic hematuria    Bradycardia    New onset type 2 diabetes mellitus    COPD (chronic obstructive pulmonary disease)    Dyslipidemia    Obstructive sleep apnea syndrome- compliant with home CPAP     Morbid obesity    Tobacco abuse    Megaloblastic anemia    History of alcohol abuse    Hyponatremia    Hypokalemia       Plan   1. Strict I&O, daily weight, low sodium diet  2. Bumex gtt discontinued by primary   3. Anticoagulated with Xarelto- monitor for abnormal bleedings. Avoid NSAIDS.   4. Monitor telemetry   5. BMP in am   6. K+ and Mag replacement per primary   7. Continue Lisinopril.  8. Restart low dose Coreg   9. Will need close CHF follow up at discharge.       Nila Bianchi, APRN  02/06/17  1:12 PM

## 2017-02-06 NOTE — PLAN OF CARE
Problem: Patient Care Overview (Adult)  Goal: Plan of Care Review  Outcome: Ongoing (interventions implemented as appropriate)    02/06/17 0410   Coping/Psychosocial Response Interventions   Plan Of Care Reviewed With patient   Patient Care Overview   Progress progress toward functional goals as expected   Outcome Evaluation   Outcome Summary/Follow up Plan bumex gtt d/c'd per order and will start bumex po this a.m. good urine out put pt rested better this shift than previous nights and able to lay back, VSS        Goal: Adult Individualization and Mutuality  Outcome: Ongoing (interventions implemented as appropriate)    Problem: Cardiac: Heart Failure (Adult)  Goal: Signs and Symptoms of Listed Potential Problems Will be Absent or Manageable (Cardiac: Heart Failure)  Outcome: Ongoing (interventions implemented as appropriate)    Problem: Arrhythmia/Dysrhythmia (Symptomatic) (Adult)  Goal: Signs and Symptoms of Listed Potential Problems Will be Absent or Manageable (Arrhythmia/Dysrhythmia)  Outcome: Ongoing (interventions implemented as appropriate)

## 2017-02-07 VITALS
RESPIRATION RATE: 18 BRPM | SYSTOLIC BLOOD PRESSURE: 121 MMHG | DIASTOLIC BLOOD PRESSURE: 55 MMHG | BODY MASS INDEX: 44.1 KG/M2 | HEIGHT: 71 IN | WEIGHT: 315 LBS | TEMPERATURE: 97.9 F | OXYGEN SATURATION: 90 % | HEART RATE: 78 BPM

## 2017-02-07 LAB
BACTERIA SPEC AEROBE CULT: NORMAL
BACTERIA SPEC AEROBE CULT: NORMAL
GLUCOSE BLDC GLUCOMTR-MCNC: 205 MG/DL (ref 70–130)
GLUCOSE BLDC GLUCOMTR-MCNC: 213 MG/DL (ref 70–130)

## 2017-02-07 PROCEDURE — 99233 SBSQ HOSP IP/OBS HIGH 50: CPT | Performed by: INTERNAL MEDICINE

## 2017-02-07 PROCEDURE — 82962 GLUCOSE BLOOD TEST: CPT

## 2017-02-07 PROCEDURE — 94640 AIRWAY INHALATION TREATMENT: CPT

## 2017-02-07 RX ORDER — BUMETANIDE 1 MG/1
1 TABLET ORAL DAILY
Qty: 30 TABLET | Refills: 0 | Status: SHIPPED | OUTPATIENT
Start: 2017-02-08 | End: 2017-02-14 | Stop reason: SDUPTHER

## 2017-02-07 RX ORDER — BUMETANIDE 1 MG/1
1 TABLET ORAL DAILY
Status: DISCONTINUED | OUTPATIENT
Start: 2017-02-08 | End: 2017-02-07 | Stop reason: HOSPADM

## 2017-02-07 RX ORDER — CARVEDILOL 3.12 MG/1
3.12 TABLET ORAL 2 TIMES DAILY WITH MEALS
Qty: 60 TABLET | Refills: 0 | Status: SHIPPED | OUTPATIENT
Start: 2017-02-07 | End: 2017-02-14 | Stop reason: SDUPTHER

## 2017-02-07 RX ORDER — POTASSIUM CHLORIDE 750 MG/1
20 CAPSULE, EXTENDED RELEASE ORAL DAILY
Qty: 60 CAPSULE | Refills: 0 | Status: SHIPPED | OUTPATIENT
Start: 2017-02-07 | End: 2017-02-14 | Stop reason: SDUPTHER

## 2017-02-07 RX ADMIN — SPIRONOLACTONE 50 MG: 50 TABLET, FILM COATED ORAL at 08:41

## 2017-02-07 RX ADMIN — POTASSIUM CHLORIDE 40 MEQ: 750 CAPSULE, EXTENDED RELEASE ORAL at 08:41

## 2017-02-07 RX ADMIN — COLCHICINE 0.6 MG: 0.6 TABLET, FILM COATED ORAL at 08:41

## 2017-02-07 RX ADMIN — MAGNESIUM GLUCONATE 500 MG ORAL TABLET 400 MG: 500 TABLET ORAL at 08:41

## 2017-02-07 RX ADMIN — INSULIN LISPRO 3 UNITS: 100 INJECTION, SOLUTION INTRAVENOUS; SUBCUTANEOUS at 08:41

## 2017-02-07 RX ADMIN — LISINOPRIL 5 MG: 5 TABLET ORAL at 08:42

## 2017-02-07 RX ADMIN — TIOTROPIUM BROMIDE 1 CAPSULE: 18 CAPSULE ORAL; RESPIRATORY (INHALATION) at 07:22

## 2017-02-07 RX ADMIN — BUMETANIDE 1 MG: 1 TABLET ORAL at 08:41

## 2017-02-07 RX ADMIN — RIVAROXABAN 20 MG: 20 TABLET, FILM COATED ORAL at 08:41

## 2017-02-07 RX ADMIN — ACETAZOLAMIDE 250 MG: 250 TABLET ORAL at 08:41

## 2017-02-07 RX ADMIN — METFORMIN HYDROCHLORIDE 500 MG: 500 TABLET ORAL at 08:41

## 2017-02-07 RX ADMIN — CARVEDILOL 3.12 MG: 3.12 TABLET, FILM COATED ORAL at 08:41

## 2017-02-07 RX ADMIN — INSULIN LISPRO 3 UNITS: 100 INJECTION, SOLUTION INTRAVENOUS; SUBCUTANEOUS at 11:50

## 2017-02-07 RX ADMIN — BUDESONIDE AND FORMOTEROL FUMARATE DIHYDRATE 2 PUFF: 160; 4.5 AEROSOL RESPIRATORY (INHALATION) at 07:26

## 2017-02-07 NOTE — PLAN OF CARE
Problem: Patient Care Overview (Adult)  Goal: Plan of Care Review  Outcome: Ongoing (interventions implemented as appropriate)    02/07/17 0334   Coping/Psychosocial Response Interventions   Plan Of Care Reviewed With patient   Patient Care Overview   Progress progress toward functional goals as expected   Outcome Evaluation   Outcome Summary/Follow up Plan Pt continues to be Afib, rate controlled in the 70s-90s. Diuresis continues; extremity edema improving. Potassium repleted.          Problem: Cardiac: Heart Failure (Adult)  Goal: Signs and Symptoms of Listed Potential Problems Will be Absent or Manageable (Cardiac: Heart Failure)  Outcome: Ongoing (interventions implemented as appropriate)    Problem: Arrhythmia/Dysrhythmia (Symptomatic) (Adult)  Goal: Signs and Symptoms of Listed Potential Problems Will be Absent or Manageable (Arrhythmia/Dysrhythmia)  Outcome: Ongoing (interventions implemented as appropriate)

## 2017-02-07 NOTE — PAYOR COMM NOTE
"[facility]  GWENDOLYN MCINTOSH RN [phone]  FAX [phone]    Shirley Bishop (51 y.o. Male) [id number]  DCD 2/7    Date of Birth Social Security Number Address Home Phone MRN    1965  [address] [address] [phone] [id number]    Yarsanism Marital Status          [facility] [place] Single       Admission Date Admission Type Admitting Provider Attending Provider Department, Room/Bed    2/2/17 Emergency Selvin Garcia DO Robinson, Maurice S, DO [facility] 4B, 408/1    Discharge Date Discharge Disposition Discharge Destination         Home or Self Care             Attending Provider: Selvin Garcia DO     Allergies:  No Known Allergies    Isolation:  None   Infection:  None   Code Status:  FULL    Ht:  71\" (180.3 cm)   Wt:  371 lb 8 oz (169 kg)    Admission Cmt:  None   Principal Problem:  Acute on chronic combined systolic and diastolic congestive heart failure [I50.43]                 Active Insurance as of 2/2/2017     Primary Coverage     Payor Plan Insurance Group Employer/Plan Group    ANTHEM MEDICARE REPLACEMENT ANTHEM MEDICARE ADVANTAGE [id number]     Payor Plan Address Payor Plan Phone Number Effective From Effective To    [address] [phone] 1/1/2016     [place] [address]       Subscriber Name Subscriber Birth Date Member ID       SHIRLEY BISHOP 1965 [id number]                 Emergency Contacts      (Rel.) Home Phone Work Phone Mobile Phone    Anahy Bishop (Mother) [phone] -- --               Discharge Summary      Selvin Garcia DO at 2/7/2017 11:34 AM              [facility]  DISCHARGE SUMMARY       Date of Admission: 2/2/2017  Date of Discharge:  2/7/2017  Primary Care Physician: No Known Provider    Discharge Diagnoses:  Patient Active Problem List   Diagnosis   • COPD (chronic obstructive pulmonary disease)   • Acute on chronic combined systolic and diastolic " congestive heart failure   • Dyslipidemia   • Obstructive sleep apnea syndrome   • Morbid obesity   • Chronic anticoagulation   • Tobacco abuse   • Paroxysmal atrial fibrillation   • Megaloblastic anemia   • History of alcohol abuse   • Microscopic hematuria   • Bradycardia   • Nonischemic cardiomyopathy   • New onset type 2 diabetes mellitus   1. Acute on chronic diastolic heart failure, EF 55%.   2. Moderate pulmonary hypertension.   3. Lower extremity edema and dyspnea.  4. Paroxysmal atrial fibrillation on chronic anticoagulation with Xarelto.   5. Bradycardia, asymptomatic at present.  6. Relative hypotension, improved.  7. Microscopic hematuria with non-obstructing nephrolithiasis.  8. 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   9. Ongoing tobacco abuse with underlying COPD.  10. DONTE on home CPAP.   11. Type II diabetes mellitus with a hemoglobin A1c of 8.8, new diagnosis.  12. Pulmonary nodule, will require outpatient followup.   13. Hypomagnesmia.   14. Slight hypokalemia.   15. Slight hyponatremia.       Presenting Problem/History of Present Illness:  Systolic congestive heart failure, unspecified congestive heart failure chronicity [I50.20]     Chief Complaint on Day of Discharge:   Shortness of breath    History of Present Illness on Day of Discharge:   Patient's respiratory function is back to baseline.  He is able to ambulate in his room without difficulty.     Hospital Course  Mr. Bishop is a 51-year-old  gentleman who resides in [place], [place]. He has been seen Juliette Chun for primary care. However, she has recently left her practice and he is in search of new primary care. He has followed with Dr. aC from cardiology in the past. He previously had a history of a nonischemic cardiomyopathy and had an ejection fraction is low as 30% in 2014. An echo was repeated later that year that showed his ejection fraction had increased to 60%. However, he  still had features of diastolic heart failure. He says he slowly been retaining fluid since around Christmas. He last saw Dr. Ca and Juliette Chun in September. He says that he has been compliant with his medications. He tells me that he has been drinking nothing but water and has not been eating any foods that contain salt. Nevertheless, he continues to retain fluid. He denies associated chest pain. He has had acceleration of his lower extremity edema. He complains of orthopnea paroxysmal nocturnal dyspnea.  He is found to have microscopic hematuria. He is unaware of ever having this problem. He denies oneyda blood in his urine. He has a history of alcohol abuse. He says he has not had any alcohol since Christmas. He previously drank whiskey on a nearly daily basis.  The patient was admitted to the telemetry unit and diuresed with Lasix twice a day.  Lisinopril and carvedilol were held because of hypotension and bradycardia.  Other home medications were continued.  Cardiology was consulted.  Diuresis was slowed initially but became very brisk after initiation of a Bumex drip.  Total negative fluid balance while hospitalized was greater than 16 L.  Echocardiogram was performed which showed an ejection fraction of 55% with only grade 1 diastolic dysfunction.  Moderate pulmonary hypertension was noted.  Digoxin and beta blocker was cautiously reinitiated and the patient continued to improve throughout his course of stay with decreasing shortness of breath and increasing exercise tolerance.  He was started on metformin for his elevated blood sugars and nutritional consult was performed.  Patient was felt stable for discharge on 2/7/2017.       Procedures Performed:   Echo:  Interpretation Summary   · Left ventricular function is normal. Estimated EF = 55%.  · Left ventricular diastolic dysfunction (grade I) consistent with impaired relaxation.  · All left ventricular wall segments contract normally.  · Right  ventricular cavity is borderline dilated. Normal function.  · Moderate pulmonary hypertension is present.         Consults:   Cardiology:  Assessment/Plan      Principal Problem:  Acute on chronic combined systolic and diastolic congestive heart failure  Active Problems:  COPD (chronic obstructive pulmonary disease)  Dyslipidemia  Obstructive sleep apnea syndrome  Morbid obesity  Chronic anticoagulation  Tobacco abuse  Paroxysmal atrial fibrillation  Megaloblastic anemia  History of alcohol abuse  Microscopic hematuria  Bradycardia  Nonischemic cardiomyopathy  New onset type 2 diabetes mellitus     Plan  Diuresis  Supportive care  Telemetry  Optimal medical therapy  On deep vein thrombosis prophylaxis/precautions  Low salt cardiac diet, fluid,   Low sodium, caffeine   Stressed compliance to diet and medications   Avoid NSAIDS     Curtis Ca MD  02/03/17  10:25 PM    Pertinent Test Results:    Specimen:  Blood Updated:  02/02/17 1357     WBC 6.13 10*3/mm3      RBC 4.99 10*6/mm3      Hemoglobin 16.6 g/dL      Hematocrit 52.3 (H) %      .8 (H) fL      MCH 33.3 (H) pg      MCHC 31.7 (L) g/dL      RDW 16.8 (H) %      RDW-SD 64.0 (H) fl      MPV 12.9 (H) fL      Platelets 120 (L) 10*3/mm3      Neutrophil % 65.0 %      Lymphocyte % 22.5 %      Monocyte % 10.6 %      Eosinophil % 1.1 %      Basophil % 0.3 %      Immature Grans % 0.5 %      Neutrophils, Absolute 3.98 10*3/mm3      Lymphocytes, Absolute 1.38 10*3/mm3      Monocytes, Absolute 0.65 10*3/mm3      Eosinophils, Absolute 0.07 10*3/mm3      Basophils, Absolute 0.02 10*3/mm3      Immature Grans, Absolute 0.03 10*3/mm3     POC Troponin, Rapid [[id number]]  (Normal) Collected:  02/02/17 1354    Specimen:  Blood Updated:  02/02/17 1411     Troponin I 0.00 ng/mL       Serial Number: [id number]    : [id number]       Protime-INR [[id number]]  (Abnormal) Collected:  02/02/17 1347    Specimen:  Blood Updated:  02/02/17 1411     Protime 26.5 (H) Seconds      INR 2.34  (H)     aPTT [[id number]]  (Abnormal) Collected:  02/02/17 1347    Specimen:  Blood Updated:  02/02/17 1411     PTT 42.6 (H) seconds     D-dimer, Quantitative [[id number]]  (Normal) Collected:  02/02/17 1347    Specimen:  Blood Updated:  02/02/17 1411     D-Dimer, Quantitative 0.45 mg/L (FEU)     Comprehensive Metabolic Panel [[id number]]  (Abnormal) Collected:  02/02/17 1347    Specimen:  Blood Updated:  02/02/17 1414     Glucose 150 (H) mg/dL      BUN 26 (H) mg/dL      Creatinine 1.19 mg/dL      Sodium 134 (L) mmol/L      Potassium 3.9 mmol/L      Chloride 98 mmol/L      CO2 30.0 mmol/L      Calcium 8.7 mg/dL      Total Protein 6.3 g/dL      Albumin 3.60 g/dL      ALT (SGPT) 40 U/L      AST (SGOT) 15 U/L      Alkaline Phosphatase 59 U/L      Total Bilirubin 1.0 mg/dL      eGFR Non African Amer 64 mL/min/1.73      Globulin 2.7 gm/dL      A/G Ratio 1.3 g/dL      BUN/Creatinine Ratio 21.8      Anion Gap 6.0 mmol/L     C-reactive Protein [[id number]]  (Normal) Collected:  02/02/17 1347    Specimen:  Blood Updated:  02/02/17 1414     C-Reactive Protein <0.50 mg/dL     Digoxin Level [[id number]]  (Normal) Collected:  02/02/17 1347    Specimen:  Blood from Arm, Left Updated:  02/02/17 1454     Digoxin 1.40 ng/mL     Urinalysis With / Culture If Indicated [[id number]]  (Abnormal) Collected:  02/02/17 1427    Specimen:  Urine from Urine, Clean Catch Updated:  02/02/17 1528     Color, UA Yellow      Appearance, UA Cloudy (A)      pH, UA 5.5      Specific Gravity, UA 1.008      Glucose, UA Negative      Ketones, UA Negative      Bilirubin, UA Negative      Blood, UA Large (3+) (A)      Protein, UA Trace (A)      Leuk Esterase, UA Moderate (2+) (A)      Nitrite, UA Negative      Urobilinogen, UA 0.2 E.U./dL     Urinalysis, Microscopic Only [[id number]]  (Abnormal) Collected:  02/02/17 1427    Specimen:  Urine from Urine, Clean Catch Updated:  02/02/17 1528     RBC, UA Too Numerous to Count (A) /HPF      WBC, UA 6-12 (A) /HPF      Bacteria,  UA None Seen /HPF      Squamous Epithelial Cells, UA 0-2 /HPF      Hyaline Casts, UA 0-2 /LPF      Methodology Automated Microscopy     BNP [[id number]]  (Abnormal) Collected:  02/02/17 1347    Specimen:  Blood Updated:  02/02/17 1751     proBNP 5350.0 (H) pg/mL     CBC (No Diff) [[id number]]  (Abnormal) Collected:  02/03/17 0425    Specimen:  Blood Updated:  02/03/17 0502     WBC 5.85 10*3/mm3      RBC 4.93 10*6/mm3      Hemoglobin 16.2 g/dL      Hematocrit 52.1 (H) %      .7 (H) fL      MCH 32.9 (H) pg      MCHC 31.1 (L) g/dL      RDW 17.0 (H) %      RDW-SD 64.8 (H) fl      MPV 13.1 (H) fL      Platelets 122 (L) 10*3/mm3     Lipid Panel [[id number]]  (Abnormal) Collected:  02/03/17 0425    Specimen:  Blood Updated:  02/03/17 0509     Total Cholesterol 110 (L) mg/dL      Triglycerides 143 mg/dL      HDL Cholesterol 22 (L) mg/dL      LDL Cholesterol  81 mg/dL      LDL/HDL Ratio 2.70     Basic Metabolic Panel [[id number]]  (Abnormal) Collected:  02/03/17 0425    Specimen:  Blood Updated:  02/03/17 0518     Glucose 169 (H) mg/dL      BUN 27 (H) mg/dL      Creatinine 1.22 mg/dL      Sodium 137 mmol/L      Potassium 3.9 mmol/L      Chloride 99 mmol/L      CO2 31.0 mmol/L      Calcium 8.9 mg/dL      eGFR Non African Amer 62 mL/min/1.73      BUN/Creatinine Ratio 22.1      Anion Gap 7.0 mmol/L     TSH [[id number]]  (Normal) Collected:  02/03/17 0425    Specimen:  Blood Updated:  02/03/17 0528     TSH 3.250 mIU/mL     Hemoglobin A1c [[id number]] Collected:  02/03/17 0425    Specimen:  Blood Updated:  02/03/17 0603     Hemoglobin A1C 8.8 %     Urine Culture [[id number]]  (Abnormal) Collected:  02/02/17 1427    Specimen:  Urine from Urine, Clean Catch Updated:  02/04/17 0722     Urine Culture        10,000-20,000 CFU/mL Mixed Gram Positive Rossy (A)    Narrative:       Probable Contaminant    Basic Metabolic Panel [[id number]]  (Abnormal) Collected:  02/04/17 0727    Specimen:  Blood Updated:  02/04/17 0815     Glucose 175 (H) mg/dL       BUN 21 mg/dL      Creatinine 1.12 mg/dL      Sodium 135 mmol/L      Potassium 3.5 mmol/L      Chloride 94 (L) mmol/L      CO2 33.0 (H) mmol/L      Calcium 8.6 mg/dL      eGFR Non African Amer 69 mL/min/1.73      BUN/Creatinine Ratio 18.8      Anion Gap 8.0 mmol/L     Magnesium [[id number]]  (Abnormal) Collected:  02/04/17 0727    Specimen:  Blood Updated:  02/04/17 0822     Magnesium 0.9 (C) mg/dL     Basic Metabolic Panel [[id number]]  (Abnormal) Collected:  02/05/17 0659    Specimen:  Blood Updated:  02/05/17 0728     Glucose 180 (H) mg/dL      BUN 21 mg/dL       Specimen hemolyzed. Results may be affected.        Creatinine 1.11 mg/dL      Sodium 133 (L) mmol/L      Potassium 3.4 (L) mmol/L       Specimen hemolyzed.  Results may be affected.        Chloride 87 (L) mmol/L      CO2 39.0 (H) mmol/L      Calcium 8.4 mg/dL      eGFR Non African Amer 69 mL/min/1.73      BUN/Creatinine Ratio 18.9      Anion Gap 7.0 mmol/L     Magnesium [[id number]]  (Abnormal) Collected:  02/05/17 0659    Specimen:  Blood Updated:  02/05/17 0730     Magnesium 0.9 (C) mg/dL     Basic Metabolic Panel [[id number]]  (Abnormal) Collected:  02/06/17 0310    Specimen:  Blood Updated:  02/06/17 0453     Glucose 198 (H) mg/dL      BUN 24 (H) mg/dL      Creatinine 1.21 mg/dL      Sodium 131 (L) mmol/L      Potassium 3.1 (L) mmol/L      Chloride 85 (L) mmol/L      CO2 38.0 (H) mmol/L      Calcium 8.7 mg/dL      eGFR Non African Amer 63 mL/min/1.73      BUN/Creatinine Ratio 19.8      Anion Gap 8.0 mmol/L     Magnesium [[id number]]  (Abnormal) Collected:  02/06/17 0310    Specimen:  Blood Updated:  02/06/17 0453     Magnesium 1.2 (L) mg/dL     Blood Culture [[id number]]  (Normal) Collected:  02/02/17 1842    Specimen:  Blood from Arm, Left Updated:  02/06/17 1901     Blood Culture No growth at 4 days     Blood Culture [[id number]]  (Normal) Collected:  02/02/17 1842    Specimen:  Blood from Arm, Right Updated:  02/06/17 1901     Blood Culture No growth at 4  days         Imaging Results (last 7 days)     Procedure Component Value Units Date/Time    XR Chest 2 View [[id number]] Collected:  02/02/17 1441     Updated:  02/02/17 1446    Narrative:       EXAMINATION: XR CHEST 2 VW- 2/2/2017 14:41 CST     HISTORY: Shortness of breath     Comparison: 07/10/2016     Findings:  Heart is magnified but felt to be mildly enlarged. There is diffuse  interstitial prominence and indistinctness of the pulmonary vasculature,  concerning for pulmonary edema. No pleural effusion or pneumothorax.  Lungs appear hyperinflated.       Impression:       Impression: Findings concerning for pulmonary edema. Alternatively,  viral etiologies could have this appearance. Correlate clinically.  This report was finalized on 02/02/2017 14:44 by Dr. Art Vasquez MD.    CT Abdomen Pelvis Without Contrast [[id number]] Collected:  02/04/17 0856     Updated:  02/04/17 0945    Narrative:       CT ABDOMEN AND PELVIS WITHOUT CONTRAST 2/4/2017 8:30 AM CST     HISTORY: Systolic heart failure, hematuria     COMPARISON: None      DLP: 1643 mGy cm     In order to have a CT radiation dose as low as reasonably achievable,  Automated Exposure Control was utilized for adjustment of the mA and/or  KV according to patient size.     TECHNIQUE: Noncontrast enhanced images of the abdomen and pelvis  obtained without oral contrast.      FINDINGS:   Small nodular area along a linear focus of density in the LEFT lung base  (axial image 11) measures up to 7 mm. Follow-up CT chest is recommended  in 6 months for reevaluation.      LIVER: No focal liver lesion.      BILIARY SYSTEM: The gallbladder is unremarkable. No intrahepatic or  extrahepatic ductal dilatation.      PANCREAS: No focal pancreatic lesion.      SPLEEN: Some calcifications in the spleen suggest old granulomatous  change.      KIDNEYS AND ADRENALS: The adrenal glands are unremarkable.  There are  innumerable nonobstructing nephroliths bilaterally. The largest on  the  RIGHT is at the inferior pole and measures up to 9 mm. The largest on  the LEFT is also at the inferior pole and measures up to 7 mm. The  ureters are decompressed.     RETROPERITONEUM: No mass, lymphadenopathy or hemorrhage.      GI TRACT: There is diverticulosis without diverticulitis. There is mild  constipation. There is no evidence of bowel obstruction. The appendix  has a normal appearance. There is ingested material within the stomach.     OTHER: There is no mesenteric mass, lymphadenopathy or fluid collection.  Degenerative changes are seen throughout the lumbar spine. There is  transitional anatomy at the lumbosacral junction on the RIGHT.          PELVIS: No mass lesion, fluid collection or significant lymphadenopathy  is seen in the pelvis. The urinary bladder is normal in appearance.       Impression:       1. No evidence of obstructive uropathy. There are numerous bilateral  nonobstructing nephroliths.  2. There is a 7 mm nodular density at the LEFT lung base. Follow-up CT  scan in 6 months is recommended per the below attached guidelines.   3. Diverticulosis without diverticulitis.        Fleischner Society Recommendations for Management of SOLID Pulmonary  Nodules:     1.  If a nodule is less than or equal to 4 mm in size, low risk patients  require no follow up, and high risk patients require a follow-up CT of  the chest at 12 months.  2.  If a nodule is 5-6 mm in size, low risk patients require a follow-up  CT at 12 months, and high risk patients require follow up CT scans at  6-12 months and 18-24 months.  3.  If a nodule is 7-8 mm in size, low risk patients requiring follow-up  at 6-12 months and 18-24 months, and high risk patients requiring  follow-up at 3-6 months, 9-12 months and 24 months.  4.  If a nodule is greater than 8 mm in size, ALL patients require  follow-up at 3, 9 and 24 months. PET/CT or biopsy should also be  considered.  This report was finalized on 02/04/2017 09:43 by   "Tyron Ravi MD.            Condition on Discharge:    Stable and back to baseline    Physical Exam on Discharge:  Visit Vitals   • /55 (BP Location: Right arm, Patient Position: Lying)   • Pulse 78   • Temp 97.9 °F (36.6 °C) (Temporal Artery )   • Resp 18   • Ht 71\" (180.3 cm)   • Wt (!) 371 lb 8 oz (169 kg)   • SpO2 92%   • BMI 51.81 kg/m2     Physical Exam  Constitutional: He is oriented to person, place, and time. He appears well-developed and well-nourished.   Up in bed, watching TV.  The patient's mother is at bedside.  Head: Normocephalic and atraumatic.   Eyes: Conjunctivae and EOM are normal. Pupils are equal, round, and reactive to light.   Neck: Neck supple. No JVD present. No thyromegaly present.   Cardiovascular: Normal rate, regular rhythm, normal heart sounds and intact distal pulses. Exam reveals no gallop and no friction rub.   No murmur heard.  Pulmonary/Chest: Effort normal. No respiratory distress. He has no wheezes. He has no rales today. He exhibits no tenderness.   Abdominal: Soft. Bowel sounds are normal. He exhibits improved distension. There is no tenderness. There is no rebound and no guarding.   Musculoskeletal: Normal range of motion. He exhibits edema continues to improve (now trace to 1+ of the bilateral lower extremities with chronic stasis dermatitis and dry skin). He exhibits no tenderness or deformity.   Neurological: He is alert and oriented to person, place, and time. He displays normal reflexes. No cranial nerve deficit. He exhibits normal muscle tone.   Skin: Skin is warm and dry. No rash noted.   Bilateral lower extremity stasis dermatitis and very dry skin. Onychomycosis of the nails.   Psychiatric: He has a normal mood and affect. His behavior is normal. Judgment and thought content normal.    Discharge Disposition:  Home or Self Care    Discharge Medications:   Donte Bishop   Home Medication Instructions HONEY:[id number]    Printed on:02/07/17 1134   Medication " Information                      acetaZOLAMIDE (DIAMOX) 500 MG capsule  Take 250 mg by mouth Daily.             budesonide-formoterol (SYMBICORT) 80-4.5 MCG/ACT inhaler  Inhale 2 puffs 2 (two) times a day.             bumetanide (BUMEX) 1 MG tablet  Take 1 tablet by mouth Daily.             carvedilol (COREG) 3.125 MG tablet  Take 1 tablet by mouth 2 (Two) Times a Day With Meals.             colchicine 0.6 MG tablet  Take 0.6 mg by mouth daily.             digoxin (LANOXIN) 125 MCG tablet  Take 250 mcg by mouth Daily. DOSE UNKNOWN                 lisinopril (PRINIVIL,ZESTRIL) 5 MG tablet  Take 5 mg by mouth daily.             magnesium oxide (MAGOX) 400 (241.3 MG) MG tablet tablet  Take 1 tablet by mouth Daily.             metFORMIN (GLUCOPHAGE) 500 MG tablet  Take 1 tablet by mouth 2 (Two) Times a Day With Meals.             potassium chloride (MICRO-K) 10 MEQ CR capsule  Take 2 capsules by mouth Daily.             rivaroxaban (XARELTO) 20 MG tablet  Take 20 mg by mouth daily.             spironolactone (ALDACTONE) 50 MG tablet  Take 50 mg by mouth daily.             tiotropium (SPIRIVA) 18 MCG per inhalation capsule  Place 1 capsule into inhaler and inhale 1 (one) time daily.                 Discharge Diet:   Diet Instructions     Diet: Regular, Consistent Carbohydrate; Thin Liquids, No Restrictions       Discharge Diet:   Regular  Consistent Carbohydrate      Fluid Consistency:  Thin Liquids, No Restrictions                 Discharge Care Plan / Instructions:   Discharge to home    Activity at Discharge:   Activity Instructions     Activity as Tolerated                     Follow-up Appointments:  Cardiology in 3 weeks  PCP in one week    Test Results Pending at Discharge:   None     Selvin Garcia DO  02/07/17  11:34 AM    Time: Discharge 35 min    Please note that portions of this note may have been completed with a voice recognition program. Efforts were made to edit the dictations, but occasionally  words are mistranscribed.             Electronically signed by Selvin Garcia DO at 2/7/2017 11:47 AM

## 2017-02-07 NOTE — DISCHARGE SUMMARY
[facility]  DISCHARGE SUMMARY       Date of Admission: 2/2/2017  Date of Discharge:  2/7/2017  Primary Care Physician: No Known Provider    Discharge Diagnoses:  Patient Active Problem List   Diagnosis   • COPD (chronic obstructive pulmonary disease)   • Acute on chronic combined systolic and diastolic congestive heart failure   • Dyslipidemia   • Obstructive sleep apnea syndrome   • Morbid obesity   • Chronic anticoagulation   • Tobacco abuse   • Paroxysmal atrial fibrillation   • Megaloblastic anemia   • History of alcohol abuse   • Microscopic hematuria   • Bradycardia   • Nonischemic cardiomyopathy   • New onset type 2 diabetes mellitus   1. Acute on chronic diastolic heart failure, EF 55%.   2. Moderate pulmonary hypertension.   3. Lower extremity edema and dyspnea.  4. Paroxysmal atrial fibrillation on chronic anticoagulation with Xarelto.   5. Bradycardia, asymptomatic at present.  6. Relative hypotension, improved.  7. Microscopic hematuria with non-obstructing nephrolithiasis.  8. 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   9. Ongoing tobacco abuse with underlying COPD.  10. DONTE on home CPAP.   11. Type II diabetes mellitus with a hemoglobin A1c of 8.8, new diagnosis.  12. Pulmonary nodule, will require outpatient followup.   13. Hypomagnesmia.   14. Slight hypokalemia.   15. Slight hyponatremia.       Presenting Problem/History of Present Illness:  Systolic congestive heart failure, unspecified congestive heart failure chronicity [I50.20]     Chief Complaint on Day of Discharge:   Shortness of breath    History of Present Illness on Day of Discharge:   Patient's respiratory function is back to baseline.  He is able to ambulate in his room without difficulty.     Hospital Course  Mr. Bishop is a 51-year-old  gentleman who resides in [place], [place]. He has been seen Juliette Chun for primary care.  However, she has recently left her practice and he is in search of new primary care. He has followed with Dr. Ca from cardiology in the past. He previously had a history of a nonischemic cardiomyopathy and had an ejection fraction is low as 30% in 2014. An echo was repeated later that year that showed his ejection fraction had increased to 60%. However, he still had features of diastolic heart failure. He says he slowly been retaining fluid since around Christmas. He last saw Dr. Ca and Juliette Chun in September. He says that he has been compliant with his medications. He tells me that he has been drinking nothing but water and has not been eating any foods that contain salt. Nevertheless, he continues to retain fluid. He denies associated chest pain. He has had acceleration of his lower extremity edema. He complains of orthopnea paroxysmal nocturnal dyspnea.  He is found to have microscopic hematuria. He is unaware of ever having this problem. He denies oneyda blood in his urine. He has a history of alcohol abuse. He says he has not had any alcohol since Christmas. He previously drank whiskey on a nearly daily basis.  The patient was admitted to the telemetry unit and diuresed with Lasix twice a day.  Lisinopril and carvedilol were held because of hypotension and bradycardia.  Other home medications were continued.  Cardiology was consulted.  Diuresis was slowed initially but became very brisk after initiation of a Bumex drip.  Total negative fluid balance while hospitalized was greater than 16 L.  Echocardiogram was performed which showed an ejection fraction of 55% with only grade 1 diastolic dysfunction.  Moderate pulmonary hypertension was noted.  Digoxin and beta blocker was cautiously reinitiated and the patient continued to improve throughout his course of stay with decreasing shortness of breath and increasing exercise tolerance.  He was started on metformin for his elevated blood sugars and  nutritional consult was performed.  Patient was felt stable for discharge on 2/7/2017.       Procedures Performed:   Echo:  Interpretation Summary   · Left ventricular function is normal. Estimated EF = 55%.  · Left ventricular diastolic dysfunction (grade I) consistent with impaired relaxation.  · All left ventricular wall segments contract normally.  · Right ventricular cavity is borderline dilated. Normal function.  · Moderate pulmonary hypertension is present.         Consults:   Cardiology:  Assessment/Plan      Principal Problem:  Acute on chronic combined systolic and diastolic congestive heart failure  Active Problems:  COPD (chronic obstructive pulmonary disease)  Dyslipidemia  Obstructive sleep apnea syndrome  Morbid obesity  Chronic anticoagulation  Tobacco abuse  Paroxysmal atrial fibrillation  Megaloblastic anemia  History of alcohol abuse  Microscopic hematuria  Bradycardia  Nonischemic cardiomyopathy  New onset type 2 diabetes mellitus     Plan  Diuresis  Supportive care  Telemetry  Optimal medical therapy  On deep vein thrombosis prophylaxis/precautions  Low salt cardiac diet, fluid,   Low sodium, caffeine   Stressed compliance to diet and medications   Avoid NSAIDS     Curtis Ca MD  02/03/17  10:25 PM    Pertinent Test Results:    Specimen:  Blood Updated:  02/02/17 1357     WBC 6.13 10*3/mm3      RBC 4.99 10*6/mm3      Hemoglobin 16.6 g/dL      Hematocrit 52.3 (H) %      .8 (H) fL      MCH 33.3 (H) pg      MCHC 31.7 (L) g/dL      RDW 16.8 (H) %      RDW-SD 64.0 (H) fl      MPV 12.9 (H) fL      Platelets 120 (L) 10*3/mm3      Neutrophil % 65.0 %      Lymphocyte % 22.5 %      Monocyte % 10.6 %      Eosinophil % 1.1 %      Basophil % 0.3 %      Immature Grans % 0.5 %      Neutrophils, Absolute 3.98 10*3/mm3      Lymphocytes, Absolute 1.38 10*3/mm3      Monocytes, Absolute 0.65 10*3/mm3      Eosinophils, Absolute 0.07 10*3/mm3      Basophils, Absolute 0.02 10*3/mm3      Immature Grans,  Absolute 0.03 10*3/mm3     POC Troponin, Rapid [[id number]]  (Normal) Collected:  02/02/17 1354    Specimen:  Blood Updated:  02/02/17 1411     Troponin I 0.00 ng/mL       Serial Number: [id number]    : [id number]       Protime-INR [[id number]]  (Abnormal) Collected:  02/02/17 1347    Specimen:  Blood Updated:  02/02/17 1411     Protime 26.5 (H) Seconds      INR 2.34 (H)     aPTT [[id number]]  (Abnormal) Collected:  02/02/17 1347    Specimen:  Blood Updated:  02/02/17 1411     PTT 42.6 (H) seconds     D-dimer, Quantitative [[id number]]  (Normal) Collected:  02/02/17 1347    Specimen:  Blood Updated:  02/02/17 1411     D-Dimer, Quantitative 0.45 mg/L (FEU)     Comprehensive Metabolic Panel [[id number]]  (Abnormal) Collected:  02/02/17 1347    Specimen:  Blood Updated:  02/02/17 1414     Glucose 150 (H) mg/dL      BUN 26 (H) mg/dL      Creatinine 1.19 mg/dL      Sodium 134 (L) mmol/L      Potassium 3.9 mmol/L      Chloride 98 mmol/L      CO2 30.0 mmol/L      Calcium 8.7 mg/dL      Total Protein 6.3 g/dL      Albumin 3.60 g/dL      ALT (SGPT) 40 U/L      AST (SGOT) 15 U/L      Alkaline Phosphatase 59 U/L      Total Bilirubin 1.0 mg/dL      eGFR Non African Amer 64 mL/min/1.73      Globulin 2.7 gm/dL      A/G Ratio 1.3 g/dL      BUN/Creatinine Ratio 21.8      Anion Gap 6.0 mmol/L     C-reactive Protein [[id number]]  (Normal) Collected:  02/02/17 1347    Specimen:  Blood Updated:  02/02/17 1414     C-Reactive Protein <0.50 mg/dL     Digoxin Level [[id number]]  (Normal) Collected:  02/02/17 1347    Specimen:  Blood from Arm, Left Updated:  02/02/17 1454     Digoxin 1.40 ng/mL     Urinalysis With / Culture If Indicated [[id number]]  (Abnormal) Collected:  02/02/17 1427    Specimen:  Urine from Urine, Clean Catch Updated:  02/02/17 1528     Color, UA Yellow      Appearance, UA Cloudy (A)      pH, UA 5.5      Specific Gravity, UA 1.008      Glucose, UA Negative      Ketones, UA Negative      Bilirubin, UA Negative      Blood, UA Large  (3+) (A)      Protein, UA Trace (A)      Leuk Esterase, UA Moderate (2+) (A)      Nitrite, UA Negative      Urobilinogen, UA 0.2 E.U./dL     Urinalysis, Microscopic Only [[id number]]  (Abnormal) Collected:  02/02/17 1427    Specimen:  Urine from Urine, Clean Catch Updated:  02/02/17 1528     RBC, UA Too Numerous to Count (A) /HPF      WBC, UA 6-12 (A) /HPF      Bacteria, UA None Seen /HPF      Squamous Epithelial Cells, UA 0-2 /HPF      Hyaline Casts, UA 0-2 /LPF      Methodology Automated Microscopy     BNP [[id number]]  (Abnormal) Collected:  02/02/17 1347    Specimen:  Blood Updated:  02/02/17 1751     proBNP 5350.0 (H) pg/mL     CBC (No Diff) [[id number]]  (Abnormal) Collected:  02/03/17 0425    Specimen:  Blood Updated:  02/03/17 0502     WBC 5.85 10*3/mm3      RBC 4.93 10*6/mm3      Hemoglobin 16.2 g/dL      Hematocrit 52.1 (H) %      .7 (H) fL      MCH 32.9 (H) pg      MCHC 31.1 (L) g/dL      RDW 17.0 (H) %      RDW-SD 64.8 (H) fl      MPV 13.1 (H) fL      Platelets 122 (L) 10*3/mm3     Lipid Panel [[id number]]  (Abnormal) Collected:  02/03/17 0425    Specimen:  Blood Updated:  02/03/17 0509     Total Cholesterol 110 (L) mg/dL      Triglycerides 143 mg/dL      HDL Cholesterol 22 (L) mg/dL      LDL Cholesterol  81 mg/dL      LDL/HDL Ratio 2.70     Basic Metabolic Panel [[id number]]  (Abnormal) Collected:  02/03/17 0425    Specimen:  Blood Updated:  02/03/17 0518     Glucose 169 (H) mg/dL      BUN 27 (H) mg/dL      Creatinine 1.22 mg/dL      Sodium 137 mmol/L      Potassium 3.9 mmol/L      Chloride 99 mmol/L      CO2 31.0 mmol/L      Calcium 8.9 mg/dL      eGFR Non African Amer 62 mL/min/1.73      BUN/Creatinine Ratio 22.1      Anion Gap 7.0 mmol/L     TSH [[id number]]  (Normal) Collected:  02/03/17 0425    Specimen:  Blood Updated:  02/03/17 0528     TSH 3.250 mIU/mL     Hemoglobin A1c [[id number]] Collected:  02/03/17 0425    Specimen:  Blood Updated:  02/03/17 0603     Hemoglobin A1C 8.8 %     Urine Culture  [[id number]]  (Abnormal) Collected:  02/02/17 1427    Specimen:  Urine from Urine, Clean Catch Updated:  02/04/17 0722     Urine Culture        10,000-20,000 CFU/mL Mixed Gram Positive Rossy (A)    Narrative:       Probable Contaminant    Basic Metabolic Panel [[id number]]  (Abnormal) Collected:  02/04/17 0727    Specimen:  Blood Updated:  02/04/17 0815     Glucose 175 (H) mg/dL      BUN 21 mg/dL      Creatinine 1.12 mg/dL      Sodium 135 mmol/L      Potassium 3.5 mmol/L      Chloride 94 (L) mmol/L      CO2 33.0 (H) mmol/L      Calcium 8.6 mg/dL      eGFR Non African Amer 69 mL/min/1.73      BUN/Creatinine Ratio 18.8      Anion Gap 8.0 mmol/L     Magnesium [[id number]]  (Abnormal) Collected:  02/04/17 0727    Specimen:  Blood Updated:  02/04/17 0822     Magnesium 0.9 (C) mg/dL     Basic Metabolic Panel [[id number]]  (Abnormal) Collected:  02/05/17 0659    Specimen:  Blood Updated:  02/05/17 0728     Glucose 180 (H) mg/dL      BUN 21 mg/dL       Specimen hemolyzed. Results may be affected.        Creatinine 1.11 mg/dL      Sodium 133 (L) mmol/L      Potassium 3.4 (L) mmol/L       Specimen hemolyzed.  Results may be affected.        Chloride 87 (L) mmol/L      CO2 39.0 (H) mmol/L      Calcium 8.4 mg/dL      eGFR Non African Amer 69 mL/min/1.73      BUN/Creatinine Ratio 18.9      Anion Gap 7.0 mmol/L     Magnesium [[id number]]  (Abnormal) Collected:  02/05/17 0659    Specimen:  Blood Updated:  02/05/17 0730     Magnesium 0.9 (C) mg/dL     Basic Metabolic Panel [[id number]]  (Abnormal) Collected:  02/06/17 0310    Specimen:  Blood Updated:  02/06/17 0453     Glucose 198 (H) mg/dL      BUN 24 (H) mg/dL      Creatinine 1.21 mg/dL      Sodium 131 (L) mmol/L      Potassium 3.1 (L) mmol/L      Chloride 85 (L) mmol/L      CO2 38.0 (H) mmol/L      Calcium 8.7 mg/dL      eGFR Non African Amer 63 mL/min/1.73      BUN/Creatinine Ratio 19.8      Anion Gap 8.0 mmol/L     Magnesium [[id number]]  (Abnormal) Collected:  02/06/17 0310    Specimen:   Blood Updated:  02/06/17 0453     Magnesium 1.2 (L) mg/dL     Blood Culture [[id number]]  (Normal) Collected:  02/02/17 1842    Specimen:  Blood from Arm, Left Updated:  02/06/17 1901     Blood Culture No growth at 4 days     Blood Culture [[id number]]  (Normal) Collected:  02/02/17 1842    Specimen:  Blood from Arm, Right Updated:  02/06/17 1901     Blood Culture No growth at 4 days         Imaging Results (last 7 days)     Procedure Component Value Units Date/Time    XR Chest 2 View [[id number]] Collected:  02/02/17 1441     Updated:  02/02/17 1446    Narrative:       EXAMINATION: XR CHEST 2 VW- 2/2/2017 14:41 CST     HISTORY: Shortness of breath     Comparison: 07/10/2016     Findings:  Heart is magnified but felt to be mildly enlarged. There is diffuse  interstitial prominence and indistinctness of the pulmonary vasculature,  concerning for pulmonary edema. No pleural effusion or pneumothorax.  Lungs appear hyperinflated.       Impression:       Impression: Findings concerning for pulmonary edema. Alternatively,  viral etiologies could have this appearance. Correlate clinically.  This report was finalized on 02/02/2017 14:44 by Dr. Art Vasquez MD.    CT Abdomen Pelvis Without Contrast [[id number]] Collected:  02/04/17 0856     Updated:  02/04/17 0945    Narrative:       CT ABDOMEN AND PELVIS WITHOUT CONTRAST 2/4/2017 8:30 AM CST     HISTORY: Systolic heart failure, hematuria     COMPARISON: None      DLP: 1643 mGy cm     In order to have a CT radiation dose as low as reasonably achievable,  Automated Exposure Control was utilized for adjustment of the mA and/or  KV according to patient size.     TECHNIQUE: Noncontrast enhanced images of the abdomen and pelvis  obtained without oral contrast.      FINDINGS:   Small nodular area along a linear focus of density in the LEFT lung base  (axial image 11) measures up to 7 mm. Follow-up CT chest is recommended  in 6 months for reevaluation.      LIVER: No focal liver lesion.       BILIARY SYSTEM: The gallbladder is unremarkable. No intrahepatic or  extrahepatic ductal dilatation.      PANCREAS: No focal pancreatic lesion.      SPLEEN: Some calcifications in the spleen suggest old granulomatous  change.      KIDNEYS AND ADRENALS: The adrenal glands are unremarkable.  There are  innumerable nonobstructing nephroliths bilaterally. The largest on the  RIGHT is at the inferior pole and measures up to 9 mm. The largest on  the LEFT is also at the inferior pole and measures up to 7 mm. The  ureters are decompressed.     RETROPERITONEUM: No mass, lymphadenopathy or hemorrhage.      GI TRACT: There is diverticulosis without diverticulitis. There is mild  constipation. There is no evidence of bowel obstruction. The appendix  has a normal appearance. There is ingested material within the stomach.     OTHER: There is no mesenteric mass, lymphadenopathy or fluid collection.  Degenerative changes are seen throughout the lumbar spine. There is  transitional anatomy at the lumbosacral junction on the RIGHT.          PELVIS: No mass lesion, fluid collection or significant lymphadenopathy  is seen in the pelvis. The urinary bladder is normal in appearance.       Impression:       1. No evidence of obstructive uropathy. There are numerous bilateral  nonobstructing nephroliths.  2. There is a 7 mm nodular density at the LEFT lung base. Follow-up CT  scan in 6 months is recommended per the below attached guidelines.   3. Diverticulosis without diverticulitis.        Fleischner Society Recommendations for Management of SOLID Pulmonary  Nodules:     1.  If a nodule is less than or equal to 4 mm in size, low risk patients  require no follow up, and high risk patients require a follow-up CT of  the chest at 12 months.  2.  If a nodule is 5-6 mm in size, low risk patients require a follow-up  CT at 12 months, and high risk patients require follow up CT scans at  6-12 months and 18-24 months.  3.  If a nodule is  "7-8 mm in size, low risk patients requiring follow-up  at 6-12 months and 18-24 months, and high risk patients requiring  follow-up at 3-6 months, 9-12 months and 24 months.  4.  If a nodule is greater than 8 mm in size, ALL patients require  follow-up at 3, 9 and 24 months. PET/CT or biopsy should also be  considered.  This report was finalized on 02/04/2017 09:43 by Dr. Tyron Ravi MD.            Condition on Discharge:    Stable and back to baseline    Physical Exam on Discharge:  Visit Vitals   • /55 (BP Location: Right arm, Patient Position: Lying)   • Pulse 78   • Temp 97.9 °F (36.6 °C) (Temporal Artery )   • Resp 18   • Ht 71\" (180.3 cm)   • Wt (!) 371 lb 8 oz (169 kg)   • SpO2 92%   • BMI 51.81 kg/m2     Physical Exam  Constitutional: He is oriented to person, place, and time. He appears well-developed and well-nourished.   Up in bed, watching TV.  The patient's mother is at bedside.  Head: Normocephalic and atraumatic.   Eyes: Conjunctivae and EOM are normal. Pupils are equal, round, and reactive to light.   Neck: Neck supple. No JVD present. No thyromegaly present.   Cardiovascular: Normal rate, regular rhythm, normal heart sounds and intact distal pulses. Exam reveals no gallop and no friction rub.   No murmur heard.  Pulmonary/Chest: Effort normal. No respiratory distress. He has no wheezes. He has no rales today. He exhibits no tenderness.   Abdominal: Soft. Bowel sounds are normal. He exhibits improved distension. There is no tenderness. There is no rebound and no guarding.   Musculoskeletal: Normal range of motion. He exhibits edema continues to improve (now trace to 1+ of the bilateral lower extremities with chronic stasis dermatitis and dry skin). He exhibits no tenderness or deformity.   Neurological: He is alert and oriented to person, place, and time. He displays normal reflexes. No cranial nerve deficit. He exhibits normal muscle tone.   Skin: Skin is warm and dry. No rash noted. "   Bilateral lower extremity stasis dermatitis and very dry skin. Onychomycosis of the nails.   Psychiatric: He has a normal mood and affect. His behavior is normal. Judgment and thought content normal.    Discharge Disposition:  Home or Self Care    Discharge Medications:   Donte Bishop   Home Medication Instructions HONEY:[id number]    Printed on:02/07/17 0194   Medication Information                      acetaZOLAMIDE (DIAMOX) 500 MG capsule  Take 250 mg by mouth Daily.             budesonide-formoterol (SYMBICORT) 80-4.5 MCG/ACT inhaler  Inhale 2 puffs 2 (two) times a day.             bumetanide (BUMEX) 1 MG tablet  Take 1 tablet by mouth Daily.             carvedilol (COREG) 3.125 MG tablet  Take 1 tablet by mouth 2 (Two) Times a Day With Meals.             colchicine 0.6 MG tablet  Take 0.6 mg by mouth daily.             digoxin (LANOXIN) 125 MCG tablet  Take 250 mcg by mouth Daily. DOSE UNKNOWN                 lisinopril (PRINIVIL,ZESTRIL) 5 MG tablet  Take 5 mg by mouth daily.             magnesium oxide (MAGOX) 400 (241.3 MG) MG tablet tablet  Take 1 tablet by mouth Daily.             metFORMIN (GLUCOPHAGE) 500 MG tablet  Take 1 tablet by mouth 2 (Two) Times a Day With Meals.             potassium chloride (MICRO-K) 10 MEQ CR capsule  Take 2 capsules by mouth Daily.             rivaroxaban (XARELTO) 20 MG tablet  Take 20 mg by mouth daily.             spironolactone (ALDACTONE) 50 MG tablet  Take 50 mg by mouth daily.             tiotropium (SPIRIVA) 18 MCG per inhalation capsule  Place 1 capsule into inhaler and inhale 1 (one) time daily.                 Discharge Diet:   Diet Instructions     Diet: Regular, Consistent Carbohydrate; Thin Liquids, No Restrictions       Discharge Diet:   Regular  Consistent Carbohydrate      Fluid Consistency:  Thin Liquids, No Restrictions                 Discharge Care Plan / Instructions:   Discharge to home    Activity at Discharge:   Activity Instructions      Activity as Tolerated                     Follow-up Appointments:  Cardiology in 3 weeks  PCP in one week    Test Results Pending at Discharge:   None     Selvin Garcia DO  02/07/17  11:34 AM    Time: Discharge 35 min    Please note that portions of this note may have been completed with a voice recognition program. Efforts were made to edit the dictations, but occasionally words are mistranscribed.

## 2017-02-07 NOTE — PAYOR COMM NOTE
"[facility]  GWENDOLYN MCINTOSH RN [phone]  FAX [phone]  Shirley Bishop (51 y.o. Male) [id number]    Date of Birth Social Security Number Address Home Phone MRN    1965  [address] [phone] [id number]    [employer] Marital Status          [facility] Single       Admission Date Admission Type Admitting Provider Attending Provider Department, Room/Bed    2/2/17 Emergency Selvin Garcia DO Robinson, Maurice S, DO [facility] 4B, 408/1    Discharge Date Discharge Disposition Discharge Destination                      Attending Provider: Selvin Garcia DO     Allergies:  No Known Allergies    Isolation:  None   Infection:  None   Code Status:  FULL    Ht:  71\" (180.3 cm)   Wt:  371 lb 8 oz (169 kg)    Admission Cmt:  None   Principal Problem:  Acute on chronic combined systolic and diastolic congestive heart failure [I50.43]                 Active Insurance as of 2/2/2017     Primary Coverage     Payor Plan Insurance Group Employer/Plan Group    ANTH MEDICARE REPLACEMENT ANTHEM MEDICARE ADVANTAGE [id number]     Payor Plan Address Payor Plan Phone Number Effective From Effective To    [address] [phone] 1/1/2016     [place] [address]       Subscriber Name Subscriber Birth Date Member ID       SHIRLEY BISHOP 1965 [id number]                 Emergency Contacts      (Rel.) Home Phone Work Phone Mobile Phone    Anahy Bishop (Mother) [phone] -- --              Intake & Output (last 3 days)       02/04 0701 - 02/05 0700 02/05 0701 - 02/06 0700 02/06 0701 - 02/07 0700 02/07 0701 - 02/08 0700    P.O. 1680 2150 360     I.V. (mL/kg) 100 (0.6)       IV Piggyback 100       Total Intake(mL/kg) 1880 (10.9) 2150 (12.7) 360 (2.1)     Urine (mL/kg/hr) 8525 (2.1) 4675 (1.2) 2500 (0.6) 1500 (3.9)    Total Output 8525 4675 2500 1500    Net -6645 -2525 -6292 -3564                Lab Results (last 24 hours)     Procedure Component " Value Units Date/Time    POC Glucose Fingerstick [[id number]]  (Abnormal) Collected:  02/06/17 1143    Specimen:  Blood Updated:  02/06/17 1155     Glucose 188 (H) mg/dL       : [id number] [facility] ID: [id number]       POC Glucose Fingerstick [[id number]]  (Abnormal) Collected:  02/06/17 1619    Specimen:  Blood Updated:  02/06/17 1647     Glucose 198 (H) mg/dL       : [id number] [facility] ID: [id number]       Blood Culture [[id number]]  (Normal) Collected:  02/02/17 1842    Specimen:  Blood from Arm, Left Updated:  02/06/17 1901     Blood Culture No growth at 4 days     Blood Culture [[id number]]  (Normal) Collected:  02/02/17 1842    Specimen:  Blood from Arm, Right Updated:  02/06/17 1901     Blood Culture No growth at 4 days     POC Glucose Fingerstick [[id number]]  (Abnormal) Collected:  02/06/17 2105    Specimen:  Blood Updated:  02/06/17 2135     Glucose 215 (H) mg/dL       : [id number] [facility]  eter ID: [id number]       POC Glucose Fingerstick [[id number]]  (Abnormal) Collected:  02/07/17 0758    Specimen:  Blood Updated:  02/07/17 0813     Glucose 205 (H) mg/dL       : [id number] [facility] ID: [id number]           Imaging Results (last 72 hours)     Procedure Component Value Units Date/Time    CT Abdomen Pelvis Without Contrast [[id number]] Collected:  02/04/17 0856     Updated:  02/04/17 0945    Narrative:       CT ABDOMEN AND PELVIS WITHOUT CONTRAST 2/4/2017 8:30 AM CST     HISTORY: Systolic heart failure, hematuria     COMPARISON: None      DLP: 1643 mGy cm     In order to have a CT radiation dose as low as reasonably achievable,  Automated Exposure Control was utilized for adjustment of the mA and/or  KV according to patient size.     TECHNIQUE: Noncontrast enhanced images of the abdomen and pelvis  obtained without oral contrast.      FINDINGS:   Small nodular area along a linear focus of density in the LEFT lung base  (axial image 11) measures up to 7  mm. Follow-up CT chest is recommended  in 6 months for reevaluation.      LIVER: No focal liver lesion.      BILIARY SYSTEM: The gallbladder is unremarkable. No intrahepatic or  extrahepatic ductal dilatation.      PANCREAS: No focal pancreatic lesion.      SPLEEN: Some calcifications in the spleen suggest old granulomatous  change.      KIDNEYS AND ADRENALS: The adrenal glands are unremarkable.  There are  innumerable nonobstructing nephroliths bilaterally. The largest on the  RIGHT is at the inferior pole and measures up to 9 mm. The largest on  the LEFT is also at the inferior pole and measures up to 7 mm. The  ureters are decompressed.     RETROPERITONEUM: No mass, lymphadenopathy or hemorrhage.      GI TRACT: There is diverticulosis without diverticulitis. There is mild  constipation. There is no evidence of bowel obstruction. The appendix  has a normal appearance. There is ingested material within the stomach.     OTHER: There is no mesenteric mass, lymphadenopathy or fluid collection.  Degenerative changes are seen throughout the lumbar spine. There is  transitional anatomy at the lumbosacral junction on the RIGHT.          PELVIS: No mass lesion, fluid collection or significant lymphadenopathy  is seen in the pelvis. The urinary bladder is normal in appearance.       Impression:       1. No evidence of obstructive uropathy. There are numerous bilateral  nonobstructing nephroliths.  2. There is a 7 mm nodular density at the LEFT lung base. Follow-up CT  scan in 6 months is recommended per the below attached guidelines.   3. Diverticulosis without diverticulitis.        Fleischner Society Recommendations for Management of SOLID Pulmonary  Nodules:     1.  If a nodule is less than or equal to 4 mm in size, low risk patients  require no follow up, and high risk patients require a follow-up CT of  the chest at 12 months.  2.  If a nodule is 5-6 mm in size, low risk patients require a follow-up  CT at 12 months,  and high risk patients require follow up CT scans at  6-12 months and 18-24 months.  3.  If a nodule is 7-8 mm in size, low risk patients requiring follow-up  at 6-12 months and 18-24 months, and high risk patients requiring  follow-up at 3-6 months, 9-12 months and 24 months.  4.  If a nodule is greater than 8 mm in size, ALL patients require  follow-up at 3, 9 and 24 months. PET/CT or biopsy should also be  considered.  This report was finalized on 2017 09:43 by Dr. Tyron Ravi MD.          Orders (last 24 hrs)     Start     Ordered    17 0814  POC Glucose Fingerstick  Once      17 0813    17 2136  POC Glucose Fingerstick  Once      17 2135    17 1647  POC Glucose Fingerstick  Once      17 1646    17 1400  carvedilol (COREG) tablet 3.125 mg  2 Times Daily With Meals      17 1338    17 1345  potassium chloride 20 mEq in 100 mL IVPB  Once      17 1310    17 1221  PT Plan of Care Cert / Re-Cert  Once     Comments:  Physical Therapy Plan of Care  1 Time PT Eval  Certification Period: 2017 - 3/8/2017    Patient Name: Donte Bishop  : 1965    (I50.20) Systolic congestive heart failure, unspecified congestive heart failure chronicity  (primary encounter diagnosis)  (Z78.9) Decreased activities of daily living (ADL)  (R68.89) Decreased activity tolerance                  Assessment  PT Assessment  Criteria for Skilled Therapeutic Interventions Met: no, current level of function same as previous level of function, patient/family declined skilled intervention at this time                PT OP Goals       17 1213          Time Calculation    PT Goal Re-Cert Due Date 17  -EVANGELISTA (r) COLE (t) EVANGELISTA (c)        User Key  (r) = Recorded By, (t) = Taken By, (c) = Cosigned By    Initials Name Provider Type    EVANGELISTA Miller, PT DPT Physical Therapist    SM Shawna Mulvihill, PT Student PT Student            Plan  PT Plan  PT Frequency:  evaluation only  Predicted Duration of Therapy Intervention (days/wks): 1-time PT eval  Planned Therapy Interventions: other (see comments) (1-time PT eval)  Outcome Summary/Follow up Plan: PT eval completed. Pt. was independent with bed mobility and transfers. Pt. ambulated ~ 30 ft. with supervision to monitor SpO2. Pt. reported he is at baseline for mobility and denies the need for PT services at this time. Anticipated d/c to home with assist. Re-consult PT if needed.        Giancarlo Miller, PT DPT  2017            By cosigning this order, either electronically or physically, I certify that the therapy services are furnished while this patient is under my care, the services outline above are required by this patient, and will be reviewed every 30 days.        M.D.:__________________________________________ Date: ______________    17 1220    17 1156  POC Glucose Fingerstick  Once      17 1155    17 1146  OT Plan of Care Cert / Re-Cert  Once     Comments:  Occupational Therapy Plan of Care  Initial Certification  Certification Period: 2017 - 3/8/2017    Patient Name: Donte Bishop  : 1965    (I50.20) Systolic congestive heart failure, unspecified congestive heart failure chronicity  (primary encounter diagnosis)  (Z78.9) Decreased activities of daily living (ADL)                Assessment  OT Assessment  OT Diagnosis: Decreased ADL  Plans/Goals Discussed With: patient, agreed upon  Criteria for Skilled Therapeutic Interventions Met: no, current level of function same as previous level of function                  Plan    OT Plan  Therapy Frequency: evaluation only  Predicted Duration of Therapy Intervention (days/wks): One time visit  Outcome Summary/Follow up Plan: OT Eval completed. Pt is independent with all mobility and Modified independent with ADL tasks. He reports he is back to his baseline functional status with ADL and mobility. He denies the need for OT services  at this time. Anticipated d/c is home with family.       Kelli Moore, OTR/L  2/6/2017        By cosigning this order, either electronically or physically, I certify that the therapy services are furnished while this patient is under my care, the services outline above are required by this patient, and will be reviewed every 30 days.        M.D.:__________________________________________ Date: ______________    02/06/17 1145    02/06/17 0900  lisinopril (PRINIVIL,ZESTRIL) tablet 5 mg  Every 24 Hours Scheduled      02/05/17 1114    02/06/17 0700  bumetanide (BUMEX) tablet 1 mg  2 Times Daily      02/05/17 1127    02/05/17 0900  magnesium oxide (MAGOX) tablet 400 mg  2 Times Daily      02/05/17 0743    02/05/17 0815  potassium chloride (MICRO-K) CR capsule 40 mEq  2 Times Daily With Meals      02/05/17 0743    02/04/17 1152  HYDROcodone-acetaminophen (NORCO) 5-325 MG per tablet 1 tablet  Every 6 Hours PRN      02/04/17 1152    02/04/17 1145  metFORMIN (GLUCOPHAGE) tablet 500 mg  2 Times Daily With Meals      02/04/17 1107    02/03/17 2130  budesonide-formoterol (SYMBICORT) 160-4.5 MCG/ACT inhaler 2 puff  2 Times Daily - RT      02/03/17 1403    02/03/17 1800  acetaZOLAMIDE (DIAMOX) tablet 250 mg  2 Times Daily      02/03/17 1358    02/03/17 1730  insulin lispro (humaLOG) injection 2-7 Units  4 Times Daily Before Meals & Nightly      02/03/17 1421    02/03/17 1700  POC Glucose Fingerstick  4 Times Daily Before Meals & at Bedtime      02/03/17 1421    02/03/17 1420  dextrose (GLUTOSE) oral gel 15 g  Every 15 Minutes PRN      02/03/17 1421    02/03/17 1420  dextrose (D50W) solution 25 g  Every 15 Minutes PRN      02/03/17 1421    02/03/17 1420  glucagon (human recombinant) (GLUCAGEN DIAGNOSTIC) injection 1 mg  Every 15 Minutes PRN      02/03/17 1421    02/03/17 0900  colchicine tablet 0.6 mg  Daily      02/02/17 2017 02/03/17 0900  rivaroxaban (XARELTO) tablet 20 mg  Daily      02/02/17 2017 02/03/17 0900   "spironolactone (ALDACTONE) tablet 50 mg  Daily      02/02/17 2017 02/02/17 2100  tiotropium (SPIRIVA) 18 MCG per inhalation capsule 1 capsule  Daily - RT      02/02/17 2017 02/02/17 2011  acetaminophen (TYLENOL) tablet 650 mg  Every 4 Hours PRN      02/02/17 2011 02/02/17 2011  ondansetron (ZOFRAN) injection 4 mg  Every 6 Hours PRN      02/02/17 2011 02/02/17 2011  sodium chloride 0.9 % flush 1-10 mL  As Needed      02/02/17 2011 02/02/17 1329  sodium chloride 0.9 % flush 10 mL  As Needed      02/02/17 1329    Unscheduled  Oxygen Therapy- Nasal Cannula; 2 LPM; Titrate for spO2: equal to or greater than, 92%  As Needed      02/02/17 1513             Physician Progress Notes (last 72 hours) (Notes from 2/4/2017  9:19 AM through 2/7/2017  9:19 AM)      Will Galo DO at 2/4/2017 11:00 AM  Version 1 of 1             [facility] Medicine Services  INPATIENT PROGRESS NOTE    Length of Stay: 2  Date of Admission: 2/2/2017  Primary Care Physician: No Known Provider (was MATEO Mir)    Subjective   Chief Complaint: edema and dyspnea  HPI   5 liters negative over the last 24 hours with stable renal function. He says he is feeling much better with regards to breathing and edema. He says he was \"up peeing all night.\"     Complains of pain in his legs at times from being in bed. He is on Xarelto and we have not used SCDs because of his edema.     Review of Systems     All pertinent negatives and positives are as above. All other systems have been reviewed and are negative unless otherwise stated.     Objective    Temp:  [96.9 °F (36.1 °C)-98.7 °F (37.1 °C)] 96.9 °F (36.1 °C)  Heart Rate:  [64-73] 72  Resp:  [18-22] 20  BP: (117-144)/(55-74) 117/64  Physical Exam  Constitutional: He is oriented to person, place, and time. He appears well-developed and well-nourished.   Head: Normocephalic and atraumatic.   Eyes: Conjunctivae and EOM are normal. Pupils are equal, round, " and reactive to light.   Neck: Neck supple. No JVD present. No thyromegaly present.   Cardiovascular: Normal rate, regular rhythm, normal heart sounds and intact distal pulses. Exam reveals no gallop and no friction rub.   No murmur heard.  Pulmonary/Chest: Effort normal. No respiratory distress. He has no wheezes. He has rales. He exhibits no tenderness.   Abdominal: Soft. Bowel sounds are normal. He exhibits distension. There is no tenderness. There is no rebound and no guarding.   Musculoskeletal: Normal range of motion. He exhibits edema (1+ bilateral lower extremities with chronic stasis dermatitis and dry skin). He exhibits no tenderness or deformity.   Neurological: He is alert and oriented to person, place, and time. He displays normal reflexes. No cranial nerve deficit. He exhibits normal muscle tone.   Skin: Skin is warm and dry. No rash noted.   Bilateral lower extremity stasis dermatitis and very dry skin. On a mycosis of the nails.   Psychiatric: He has a normal mood and affect. His behavior is normal. Judgment and thought content normal.     Intake/Output Summary (Last 24 hours) at 02/04/17 1111  Last data filed at 02/04/17 1100   Gross per 24 hour   Intake   2360 ml   Output   7875 ml   Net  -5515 ml     Results Review:  I have reviewed the labs, radiology results, and diagnostic studies.    Laboratory Data:     Results from last 7 days  Lab Units 02/04/17  0727 02/03/17  0425 02/02/17  1347   SODIUM mmol/L 135 137 134*   POTASSIUM mmol/L 3.5 3.9 3.9   CHLORIDE mmol/L 94* 99 98   TOTAL CO2 mmol/L 33.0* 31.0 30.0   BUN mg/dL 21 27* 26*   CREATININE mg/dL 1.12 1.22 1.19   CALCIUM mg/dL 8.6 8.9 8.7   BILIRUBIN mg/dL  --   --  1.0   ALK PHOS U/L  --   --  59   ALT (SGPT) U/L  --   --  40   AST (SGOT) U/L  --   --  15   GLUCOSE mg/dL 175* 169* 150*     Radiology Data:   Imaging Results (last 24 hours)     Procedure Component Value Units Date/Time    CT Abdomen Pelvis Without Contrast [[id number]] Collected:   02/04/17 0856     Updated:  02/04/17 0945    Narrative:       CT ABDOMEN AND PELVIS WITHOUT CONTRAST 2/4/2017 8:30 AM CST     HISTORY: Systolic heart failure, hematuria     COMPARISON: None      DLP: 1643 mGy cm     In order to have a CT radiation dose as low as reasonably achievable,  Automated Exposure Control was utilized for adjustment of the mA and/or  KV according to patient size.     TECHNIQUE: Noncontrast enhanced images of the abdomen and pelvis  obtained without oral contrast.      FINDINGS:   Small nodular area along a linear focus of density in the LEFT lung base  (axial image 11) measures up to 7 mm. Follow-up CT chest is recommended  in 6 months for reevaluation.      LIVER: No focal liver lesion.      BILIARY SYSTEM: The gallbladder is unremarkable. No intrahepatic or  extrahepatic ductal dilatation.      PANCREAS: No focal pancreatic lesion.      SPLEEN: Some calcifications in the spleen suggest old granulomatous  change.      KIDNEYS AND ADRENALS: The adrenal glands are unremarkable.  There are  innumerable nonobstructing nephroliths bilaterally. The largest on the  RIGHT is at the inferior pole and measures up to 9 mm. The largest on  the LEFT is also at the inferior pole and measures up to 7 mm. The  ureters are decompressed.     RETROPERITONEUM: No mass, lymphadenopathy or hemorrhage.      GI TRACT: There is diverticulosis without diverticulitis. There is mild  constipation. There is no evidence of bowel obstruction. The appendix  has a normal appearance. There is ingested material within the stomach.     OTHER: There is no mesenteric mass, lymphadenopathy or fluid collection.  Degenerative changes are seen throughout the lumbar spine. There is  transitional anatomy at the lumbosacral junction on the RIGHT.          PELVIS: No mass lesion, fluid collection or significant lymphadenopathy  is seen in the pelvis. The urinary bladder is normal in appearance.       Impression:       1. No evidence of  obstructive uropathy. There are numerous bilateral  nonobstructing nephroliths.  2. There is a 7 mm nodular density at the LEFT lung base. Follow-up CT  scan in 6 months is recommended per the below attached guidelines.   3. Diverticulosis without diverticulitis.        Fleischner Society Recommendations for Management of SOLID Pulmonary  Nodules:     1.  If a nodule is less than or equal to 4 mm in size, low risk patients  require no follow up, and high risk patients require a follow-up CT of  the chest at 12 months.  2.  If a nodule is 5-6 mm in size, low risk patients require a follow-up  CT at 12 months, and high risk patients require follow up CT scans at  6-12 months and 18-24 months.  3.  If a nodule is 7-8 mm in size, low risk patients requiring follow-up  at 6-12 months and 18-24 months, and high risk patients requiring  follow-up at 3-6 months, 9-12 months and 24 months.  4.  If a nodule is greater than 8 mm in size, ALL patients require  follow-up at 3, 9 and 24 months. PET/CT or biopsy should also be  considered.  This report was finalized on 02/04/2017 09:43 by Dr. Tyron Ravi MD.      ECHO Interpretation Summary   · Left ventricular function is normal. Estimated EF = 55%.  · Left ventricular diastolic dysfunction (grade I) consistent with impaired relaxation.  · All left ventricular wall segments contract normally.  · Right ventricular cavity is borderline dilated. Normal function.  · Moderate pulmonary hypertension is present.     Most recent Accu-Cheks are 150, 169, 171, and 154.    I have reviewed the patient current medications.     Assessment/Plan   Assessment:   1. Acute on chronic diastolic heart failure, EF 55%.   2. Moderate pulmonary hypertension.   3. Lower extremity edema and dyspnea.  4. Paroxysmal atrial fibrillation on chronic anticoagulation with Xarelto.   5. Bradycardia, asymptomatic at present.  6. Relative hypotension, improved.  7. Microscopic hematuria with non-obstructing  nephrolithiasis.  8. 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   9. Ongoing tobacco abuse with underlying COPD.  10. Megaloblastosis.  11. Type II diabetes mellitus with a hemoglobin A1c of 8.8, new diagnosis.  12. Pulmonary nodule, will require outpatient followup.   13. Hypomagnesmia.      Plan:   Disontinued IV Lasix on 2/3 and placed on a Bumex drip. I would continue this for another 24-48 hours and monitor his status and renal function. Continue Aldactone.     Carvedilol and digoxin are on hold secondary to bradycardia and pauses at presentation.  He has been rate controlled atrial fibrillation on telemetry with no significant bradycardia recently.  However, he continues to have small pauses at times.     Back on a low-dose of lisinopril.    Replace magnesium IV. Place oral potassium.      Continue on Xarelto.     Start on metformin. Diabetic educator. Nutrition consult.     PT/OT consults.      Discharge Planning: I expect patient to be discharged to home in 2-3 days.    Will Galo DO   02/04/17   11:01 AM       Electronically signed by Will Galo DO at 2/4/2017 11:53 AM      MATEO Lo at 2/4/2017 11:07 AM  Version 1 of 1    Attestation signed by Bala Dudley MD at 2/4/2017  1:59 PM        I have personally reviewed EKG and telemetry which reveals afib with LBBB  Heart- IRRR  Lungs- Clear  Ext- significant edema    New problems that need Evaluation:  Triad of morbid obesity, sleep apnea, right heart failure    New problems that are stable:  afib  AODM  COPD  HLD    Cont diuresis    Medical Decision Making: High Complexity    I have seen and examined the patient and agree with the findings below                                   [facility] -  Progress Note     LOS: 2 days   Patient Care Team:  No Known Provider as PCP - General      Reason for consultation: CHF     Subjective     Interval History:   diuresis overnight  "since placed on Bumex gtt. \"I'm breathing so much better\".     Review of Systems:   Review of Systems   Constitution: Positive for weakness and malaise/fatigue. Negative for diaphoresis and fever.   Cardiovascular: Positive for dyspnea on exertion and leg swelling. Negative for chest pain, irregular heartbeat, near-syncope, palpitations, paroxysmal nocturnal dyspnea and syncope.   Respiratory: Positive for shortness of breath (improving) and sputum production (white). Negative for wheezing.    Skin: Negative for rash.   Musculoskeletal: Negative for falls.   Gastrointestinal: Negative for bloating, abdominal pain, nausea and vomiting.   Psychiatric/Behavioral: Negative for altered mental status.        Objective     Vital Sign Min/Max for last 24 hours  Temp  Min: 96.9 °F (36.1 °C)  Max: 98.7 °F (37.1 °C)   BP  Min: 117/64  Max: 144/74   Pulse  Min: 53  Max: 73   Resp  Min: 18  Max: 22   SpO2  Min: 91 %  Max: 93 %   Flow (L/min)  Min: 2  Max: 2   Weight  Min: 392 lb 4 oz (178 kg)  Max: 392 lb 4 oz (178 kg)     Last Weight    02/03/17 2000   Weight: (!) 392 lb 4 oz (178 kg) (standing)         Intake/Output Summary (Last 24 hours) at 02/04/17 1123  Last data filed at 02/04/17 1100   Gross per 24 hour   Intake   2360 ml   Output   7875 ml   Net  -5515 ml         Physical Exam:  Physical Exam   Constitutional: He is oriented to person, place, and time. He appears well-developed and well-nourished. He is cooperative. No distress. Nasal cannula in place.   Resting comfortably in bed. Obese.    HENT:   Head: Normocephalic and atraumatic.   Cardiovascular: Normal rate and intact distal pulses.  An irregularly irregular rhythm present.   Telemetry: A-Fib 61-85 BPM, 2 pauses overnight lasting 2.0 and 2.37 seconds    Musculoskeletal: He exhibits edema (3+BLE pitting edema).   Neurological: He is alert and oriented to person, place, and time.   Skin: Skin is warm and dry. No rash noted. He is not diaphoretic.   Psychiatric: He " has a normal mood and affect. His speech is normal and behavior is normal.        Results Review:   Lab Results   Component Value Date    WBC 5.85 02/03/2017    HGB 16.2 02/03/2017    HCT 52.1 (H) 02/03/2017    .7 (H) 02/03/2017     (L) 02/03/2017     Lab Results   Component Value Date    GLUCOSE 175 (H) 02/04/2017    CALCIUM 8.6 02/04/2017     02/04/2017    K 3.5 02/04/2017    CO2 33.0 (H) 02/04/2017    CL 94 (L) 02/04/2017    BUN 21 02/04/2017    CREATININE 1.12 02/04/2017    EGFRIFNONA 69 02/04/2017    BCR 18.8 02/04/2017    ANIONGAP 8.0 02/04/2017        Echo EF Estimated  Lab Results   Component Value Date    ECHOEFEST 55 02/03/2017       Medication Review: yes  Current Facility-Administered Medications   Medication Dose Route Frequency Provider Last Rate Last Dose   • acetaminophen (TYLENOL) tablet 650 mg  650 mg Oral Q4H PRN Will Galo DO       • acetaZOLAMIDE (DIAMOX) tablet 250 mg  250 mg Oral BID Will Galo DO   250 mg at 02/04/17 0958   • budesonide-formoterol (SYMBICORT) 160-4.5 MCG/ACT inhaler 2 puff  2 puff Inhalation BID - RT Will Galo DO   2 puff at 02/04/17 1006   • bumetanide (BUMEX) 0.1 mg/mL in sodium chloride 0.9 % 100 mL infusion  0.5 mg/hr Intravenous Continuous Will Galo DO 5 mL/hr at 02/04/17 0959 0.5 mg/hr at 02/04/17 0959   • colchicine tablet 0.6 mg  0.6 mg Oral Daily Will Galo DO   0.6 mg at 02/04/17 0958   • dextrose (D50W) solution 25 g  25 g Intravenous Q15 Min PRN Will Galo DO       • dextrose (GLUTOSE) oral gel 15 g  15 g Oral Q15 Min PRN Will Galo DO       • glucagon (human recombinant) (GLUCAGEN DIAGNOSTIC) injection 1 mg  1 mg Subcutaneous Q15 Min PRN Will Galo DO       • insulin lispro (humaLOG) injection 2-7 Units  2-7 Units Subcutaneous 4x Daily AC & at Bedtime Will Galo DO   2 Units at 02/04/17 0958   • lisinopril (PRINIVIL,ZESTRIL) tablet 2.5 mg  2.5 mg Oral Q24H Will Galo DO   2.5 mg at 02/04/17  0958   • metFORMIN (GLUCOPHAGE) tablet 500 mg  500 mg Oral BID With Meals Will Galo DO       • ondansetron (ZOFRAN) injection 4 mg  4 mg Intravenous Q6H PRN Wlil Galo DO       • potassium chloride (MICRO-K) CR capsule 20 mEq  20 mEq Oral BID With Meals Will Galo DO       • rivaroxaban (XARELTO) tablet 20 mg  20 mg Oral Daily Will Galo DO   20 mg at 02/04/17 0958   • sodium chloride 0.9 % flush 1-10 mL  1-10 mL Intravenous PRN Will Galo DO       • sodium chloride 0.9 % flush 10 mL  10 mL Intravenous PRN MATEO Carbajal       • spironolactone (ALDACTONE) tablet 50 mg  50 mg Oral Daily Will Galo DO   50 mg at 02/04/17 0958   • tiotropium (SPIRIVA) 18 MCG per inhalation capsule 1 capsule  1 capsule Inhalation Daily - RT Will Galo DO   1 capsule at 02/04/17 1006         Assessment&#47;Plan     Principal Problem:    Acute on chronic combined systolic and diastolic congestive heart failure- significant diuresis since starting Bumex gtt.     Active Problems:    Paroxysmal atrial fibrillation    Nonischemic cardiomyopathy    Chronic anticoagulation    Microscopic hematuria    Bradycardia    New onset type 2 diabetes mellitus    COPD (chronic obstructive pulmonary disease)    Dyslipidemia    Obstructive sleep apnea syndrome    Morbid obesity    Tobacco abuse    Megaloblastic anemia    History of alcohol abuse      Plan   1. Strict I&O, daily weight, low sodium diet  2. Bumex gtt- plans to continue for another day  3. Anticoagulated with Xarelto   4. Monitor telemetry   5. BMP in am       MATEO Lo  02/04/17  11:23 AM                 Electronically signed by Bala Dudley MD at 2/4/2017  1:59 PM      MATEO Lo at 2/5/2017  8:30 AM  Version 1 of 1    Attestation signed by Bala Dudley MD at 2/5/2017 11:42 AM        I have reviewed the documentation above and agree.                                 [facility] -  Progress  "Note     LOS: 3 days   Patient Care Team:  No Known Provider as PCP - General      Reason for consultation: CHF     Subjective     Interval History:     \"My breathing is a lot better today. My legs are still swollen. I can't sleep bc I'm peeing non stop but I don't want the Bumex gtt off until all this fluid is off\".     Review of Systems:   Review of Systems   Constitution: Positive for weakness and malaise/fatigue. Negative for diaphoresis and fever.   Cardiovascular: Positive for dyspnea on exertion and leg swelling. Negative for chest pain, irregular heartbeat, near-syncope, palpitations, paroxysmal nocturnal dyspnea and syncope.   Respiratory: Positive for shortness of breath (improving) and sputum production (white). Negative for wheezing.    Skin: Negative for rash.   Musculoskeletal: Negative for falls.   Gastrointestinal: Negative for bloating, abdominal pain, nausea and vomiting.   Psychiatric/Behavioral: Negative for altered mental status.        Objective     Vital Sign Min/Max for last 24 hours  Temp  Min: 97.3 °F (36.3 °C)  Max: 98.1 °F (36.7 °C)   BP  Min: 105/58  Max: 135/62   Pulse  Min: 53  Max: 83   Resp  Min: 18  Max: 20   SpO2  Min: 91 %  Max: 93 %   Flow (L/min)  Min: 2  Max: 2   Weight  Min: 379 lb 14.4 oz (172 kg)  Max: 379 lb 14.4 oz (172 kg)     Last Weight    02/04/17 1954   Weight: (!) 379 lb 14.4 oz (172 kg)         Intake/Output Summary (Last 24 hours) at 02/05/17 0842  Last data filed at 02/05/17 0744   Gross per 24 hour   Intake   1880 ml   Output   8775 ml   Net  -6895 ml         Physical Exam:  Physical Exam   Constitutional: He is oriented to person, place, and time. He appears well-developed and well-nourished. He is cooperative. No distress. Nasal cannula in place.   Resting comfortably in bed. Obese.    HENT:   Head: Normocephalic and atraumatic.   Cardiovascular: Normal rate and intact distal pulses.  An irregularly irregular rhythm present.   Telemetry: A-Flutter 59-92 BPM, " no pauses overnight or this morning    Pulmonary/Chest: Effort normal and breath sounds normal. No accessory muscle usage. No respiratory distress.   Abdominal: Soft. Bowel sounds are normal. He exhibits no distension. There is no tenderness.   Musculoskeletal: He exhibits edema (2+ BLE pitting edema- improved compared to yesterday).   Neurological: He is alert and oriented to person, place, and time.   Skin: Skin is warm and dry. No rash noted. He is not diaphoretic.   Psychiatric: He has a normal mood and affect. His speech is normal and behavior is normal.        Results Review:   Lab Results   Component Value Date    WBC 5.85 02/03/2017    HGB 16.2 02/03/2017    HCT 52.1 (H) 02/03/2017    .7 (H) 02/03/2017     (L) 02/03/2017     Lab Results   Component Value Date    GLUCOSE 180 (H) 02/05/2017    CALCIUM 8.4 02/05/2017     (L) 02/05/2017    K 3.4 (L) 02/05/2017    CO2 39.0 (H) 02/05/2017    CL 87 (L) 02/05/2017    BUN 21 02/05/2017    CREATININE 1.11 02/05/2017    EGFRIFNONA 69 02/05/2017    BCR 18.9 02/05/2017    ANIONGAP 7.0 02/05/2017        Echo EF Estimated  Lab Results   Component Value Date    ECHOEFEST 55 02/03/2017       Medication Review: yes  Current Facility-Administered Medications   Medication Dose Route Frequency Provider Last Rate Last Dose   • acetaminophen (TYLENOL) tablet 650 mg  650 mg Oral Q4H PRN Will Galo DO       • acetaZOLAMIDE (DIAMOX) tablet 250 mg  250 mg Oral BID Will Galo DO   250 mg at 02/04/17 1712   • budesonide-formoterol (SYMBICORT) 160-4.5 MCG/ACT inhaler 2 puff  2 puff Inhalation BID - RT Will Galo DO   2 puff at 02/05/17 0806   • bumetanide (BUMEX) 0.1 mg/mL in sodium chloride 0.9 % 100 mL infusion  0.5 mg/hr Intravenous Continuous Will Galo DO 5 mL/hr at 02/05/17 0606 0.5 mg/hr at 02/05/17 8248   • colchicine tablet 0.6 mg  0.6 mg Oral Daily Will Galo DO   0.6 mg at 02/04/17 7840   • dextrose (D50W) solution 25 g  25 g  Intravenous Q15 Min PRN Will Galo DO       • dextrose (GLUTOSE) oral gel 15 g  15 g Oral Q15 Min PRN Will Galo DO       • glucagon (human recombinant) (GLUCAGEN DIAGNOSTIC) injection 1 mg  1 mg Subcutaneous Q15 Min PRN Will Galo DO       • HYDROcodone-acetaminophen (NORCO) 5-325 MG per tablet 1 tablet  1 tablet Oral Q6H PRN Will Galo DO   1 tablet at 02/05/17 0058   • insulin lispro (humaLOG) injection 2-7 Units  2-7 Units Subcutaneous 4x Daily AC & at Bedtime Will Galo DO   2 Units at 02/05/17 0832   • lisinopril (PRINIVIL,ZESTRIL) tablet 2.5 mg  2.5 mg Oral Q24H Will Galo DO   2.5 mg at 02/05/17 0833   • magnesium oxide (MAGOX) tablet 400 mg  400 mg Oral BID Will Galo DO   400 mg at 02/05/17 0833   • magnesium sulfate 2 g in dextrose (D5W) 5 % 100 mL IVPB  2 g Intravenous Once Will Galo DO       • metFORMIN (GLUCOPHAGE) tablet 500 mg  500 mg Oral BID With Meals Will Galo DO   500 mg at 02/04/17 1711   • ondansetron (ZOFRAN) injection 4 mg  4 mg Intravenous Q6H PRN Will Galo DO       • potassium chloride (MICRO-K) CR capsule 40 mEq  40 mEq Oral BID With Meals Will Galo DO       • rivaroxaban (XARELTO) tablet 20 mg  20 mg Oral Daily Will Galo DO   20 mg at 02/04/17 0958   • sodium chloride 0.9 % flush 1-10 mL  1-10 mL Intravenous PRN Will Galo DO       • sodium chloride 0.9 % flush 10 mL  10 mL Intravenous PRN MATEO Carbajal       • spironolactone (ALDACTONE) tablet 50 mg  50 mg Oral Daily Will Galo DO   50 mg at 02/04/17 0958   • tiotropium (SPIRIVA) 18 MCG per inhalation capsule 1 capsule  1 capsule Inhalation Daily - RT Will Galo DO   1 capsule at 02/05/17 0805         Assessment&#47;Plan     Principal Problem:    Acute on chronic combined systolic and diastolic congestive heart failure- significant diuresis since starting Bumex gtt. 13lb weight loss since admission.     Active Problems:    Paroxysmal atrial  "fibrillation, rate controlled. No pauses overnight.     Nonischemic cardiomyopathy    Chronic anticoagulation with Xarelto     Microscopic hematuria    Bradycardia    New onset type 2 diabetes mellitus    COPD (chronic obstructive pulmonary disease)    Dyslipidemia    Obstructive sleep apnea syndrome- compliant with home CPAP     Morbid obesity    Tobacco abuse    Megaloblastic anemia    History of alcohol abuse    Hyponatremia    Hypokalemia       Plan   1. Strict I&O, daily weight, low sodium diet  2. Bumex gtt- will likely d/c today- defer to primary who started the gtt  3. Anticoagulated with Xarelto   4. Monitor telemetry   5. BMP in am   6. K+ replacement  7. Continue Lisinopril. BB on hold due to bradycardia and pauses.       Nila Bianchi, APRN  02/05/17  8:42 AM                 Electronically signed by Bala Dudley MD at 2/5/2017 11:42 AM      Will Galo DO at 2/5/2017 11:04 AM  Version 2 of 2             [facility]  INPATIENT PROGRESS NOTE    Length of Stay: 3  Date of Admission: 2/2/2017  Primary Care Physician: No Known Provider (was Juliette Chun, APRN)    Subjective   Chief Complaint: shortness of breath, edema   HPI   He is down 11 liters over the last 48 hours and his renal function is tolerating it without any issue.     He feels considerably better. \"I have legs and can breathe now.\"     He would really like to stop the drip if he can.  He is having quite a bit of trouble from getting up and down so much to urinate at night.  He claims he needs some rest.  I discussed this with his nurse, Angi, and we will discontinue the drip at 6 o'clock tonight and start oral Bumex in the morning.    Review of Systems     All pertinent negatives and positives are as above. All other systems have been reviewed and are negative unless otherwise stated.     Objective    Temp:  [97.3 °F (36.3 °C)-98.1 °F (36.7 °C)] 97.5 °F (36.4 °C)  Heart Rate:  " [68-83] 77  Resp:  [18-20] 18  BP: (105-133)/(58-77) 127/65 Telemetry shows atrial flutter from 59-92, no pauses overnight or this morning.   Physical Exam  Constitutional: He is oriented to person, place, and time. He appears well-developed and well-nourished.   Up in bed, watching TV. Discussed with his nurse, Angi.   Head: Normocephalic and atraumatic.   Eyes: Conjunctivae and EOM are normal. Pupils are equal, round, and reactive to light.   Neck: Neck supple. No JVD present. No thyromegaly present.   Cardiovascular: Normal rate, regular rhythm, normal heart sounds and intact distal pulses. Exam reveals no gallop and no friction rub.   No murmur heard.  Pulmonary/Chest: Effort normal. No respiratory distress. He has no wheezes. He has no rales today. He exhibits no tenderness.   Abdominal: Soft. Bowel sounds are normal. He exhibits improved distension. There is no tenderness. There is no rebound and no guarding.   Musculoskeletal: Normal range of motion. He exhibits edema continues to improve (now trace to 1+ of the bilateral lower extremities with chronic stasis dermatitis and dry skin). He exhibits no tenderness or deformity.   Neurological: He is alert and oriented to person, place, and time. He displays normal reflexes. No cranial nerve deficit. He exhibits normal muscle tone.   Skin: Skin is warm and dry. No rash noted.   Bilateral lower extremity stasis dermatitis and very dry skin. Onychomycosis of the nails.   Psychiatric: He has a normal mood and affect. His behavior is normal. Judgment and thought content normal.     Intake/Output Summary (Last 24 hours) at 02/05/17 1108  Last data filed at 02/05/17 0852   Gross per 24 hour   Intake   1880 ml   Output   8100 ml   Net  -6220 ml     Results Review:  I have reviewed the labs, radiology results, and diagnostic studies.    Laboratory Data:     Results from last 7 days  Lab Units 02/05/17  0659 02/04/17  0727 02/03/17  0425 02/02/17  1347   SODIUM mmol/L  133* 135 137 134*   POTASSIUM mmol/L 3.4* 3.5 3.9 3.9   CHLORIDE mmol/L 87* 94* 99 98   TOTAL CO2 mmol/L 39.0* 33.0* 31.0 30.0   BUN mg/dL 21 21 27* 26*   CREATININE mg/dL 1.11 1.12 1.22 1.19   CALCIUM mg/dL 8.4 8.6 8.9 8.7   BILIRUBIN mg/dL  --   --   --  1.0   ALK PHOS U/L  --   --   --  59   ALT (SGPT) U/L  --   --   --  40   AST (SGOT) U/L  --   --   --  15   GLUCOSE mg/dL 180* 175* 169* 150*     Magnesium 0.9 again.     Most recent Accu-Cheks are 179, 163, 180, 176.    I have reviewed the patient current medications.     Assessment/Plan   Assessment:   1. Acute on chronic diastolic heart failure, EF 55%.   2. Moderate pulmonary hypertension.   3. Lower extremity edema and dyspnea.  4. Paroxysmal atrial fibrillation on chronic anticoagulation with Xarelto.   5. Bradycardia, asymptomatic at present.  6. Relative hypotension, improved.  7. Microscopic hematuria with non-obstructing nephrolithiasis.  8. 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   9. Ongoing tobacco abuse with underlying COPD.  10. DONTE on home CPAP.   11. Type II diabetes mellitus with a hemoglobin A1c of 8.8, new diagnosis.  12. Pulmonary nodule, will require outpatient followup.   13. Hypomagnesmia.   14. Slight hypokalemia.   15. Slight hyponatremia.       Plan:   Disontinued IV Lasix on 2/3 and placed on a Bumex drip. I would continue this until 1800 tonight and then start oral Bumex in the morning. Monitor renal function and electrolytes.        Carvedilol and digoxin have been held secondary to bradycardia and pauses at presentation.  He has been rate controlled atrial fibrillation/flutter on telemetry with no significant bradycardia or pauses recently. I will defer to cardiology on restart of these.       Back on a low-dose of lisinopril and will increase to 5 mg po daily.      Replace magnesium IV and orally. Continue oral potassium.       Continue on Xarelto.      Started on metformin on 2/4. Diabetic  "educator. Nutrition consult.      PT/OT consults.       Discharge Planning: I expect patient to be discharged to home in 1-2 days.    Will Galo DO   02/05/17   11:04 AM       Electronically signed by Will Galo DO at 2/5/2017  1:28 PM      Will Galo DO at 2/5/2017 11:04 AM  Version 1 of 2             [facility]  INPATIENT PROGRESS NOTE    Length of Stay: 3  Date of Admission: 2/2/2017  Primary Care Physician: No Known Provider (was MATEO Mir)    Subjective   Chief Complaint: shortness of breath, edema   HPI   He is down 11 liters over the last 48 hours and his renal function is tolerating it without any issue.     He feels considerably better. \"I have legs and can breathe now.\"     He would really like to stop the drip if he can.  He is having quite a bit of trouble from getting up and down so much to urinate at night.  He claims he needs some rest.  I discussed this with his nurse, Angi, and we will discontinue the drip at 6 o'clock tonight and start oral Bumex in the morning.    Review of Systems     All pertinent negatives and positives are as above. All other systems have been reviewed and are negative unless otherwise stated.     Objective    Temp:  [97.3 °F (36.3 °C)-98.1 °F (36.7 °C)] 97.5 °F (36.4 °C)  Heart Rate:  [68-83] 77  Resp:  [18-20] 18  BP: (105-133)/(58-77) 127/65 Telemetry shows atrial flutter from 59-92, no pauses overnight or this morning.   Physical Exam  Constitutional: He is oriented to person, place, and time. He appears well-developed and well-nourished.   Up in bed, watching TV. Discussed with his nurse, Angi.   Head: Normocephalic and atraumatic.   Eyes: Conjunctivae and EOM are normal. Pupils are equal, round, and reactive to light.   Neck: Neck supple. No JVD present. No thyromegaly present.   Cardiovascular: Normal rate, regular rhythm, normal heart sounds and intact distal pulses. Exam reveals no " gallop and no friction rub.   No murmur heard.  Pulmonary/Chest: Effort normal. No respiratory distress. He has no wheezes. He has no rales today. He exhibits no tenderness.   Abdominal: Soft. Bowel sounds are normal. He exhibits improved distension. There is no tenderness. There is no rebound and no guarding.   Musculoskeletal: Normal range of motion. He exhibits edema continues to improve (now trace to 1+ of the bilateral lower extremities with chronic stasis dermatitis and dry skin). He exhibits no tenderness or deformity.   Neurological: He is alert and oriented to person, place, and time. He displays normal reflexes. No cranial nerve deficit. He exhibits normal muscle tone.   Skin: Skin is warm and dry. No rash noted.   Bilateral lower extremity stasis dermatitis and very dry skin. Onychomycosis of the nails.   Psychiatric: He has a normal mood and affect. His behavior is normal. Judgment and thought content normal.     Intake/Output Summary (Last 24 hours) at 02/05/17 1108  Last data filed at 02/05/17 0852   Gross per 24 hour   Intake   1880 ml   Output   8100 ml   Net  -6220 ml     Results Review:  I have reviewed the labs, radiology results, and diagnostic studies.    Laboratory Data:     Results from last 7 days  Lab Units 02/05/17  0659 02/04/17  0727 02/03/17  0425 02/02/17  1347   SODIUM mmol/L 133* 135 137 134*   POTASSIUM mmol/L 3.4* 3.5 3.9 3.9   CHLORIDE mmol/L 87* 94* 99 98   TOTAL CO2 mmol/L 39.0* 33.0* 31.0 30.0   BUN mg/dL 21 21 27* 26*   CREATININE mg/dL 1.11 1.12 1.22 1.19   CALCIUM mg/dL 8.4 8.6 8.9 8.7   BILIRUBIN mg/dL  --   --   --  1.0   ALK PHOS U/L  --   --   --  59   ALT (SGPT) U/L  --   --   --  40   AST (SGOT) U/L  --   --   --  15   GLUCOSE mg/dL 180* 175* 169* 150*     Magnesium 0.9 again.     Most recent Accu-Cheks are 179, 163, 180, 176.    I have reviewed the patient current medications.     Assessment/Plan   Assessment:   1. Acute on chronic diastolic heart failure, EF 55%.   2.  Moderate pulmonary hypertension.   3. Lower extremity edema and dyspnea.  4. Paroxysmal atrial fibrillation on chronic anticoagulation with Xarelto.   5. Bradycardia, asymptomatic at present.  6. Relative hypotension, improved.  7. Microscopic hematuria with non-obstructing nephrolithiasis.  8. 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   9. Ongoing tobacco abuse with underlying COPD.  10. Megaloblastosis.  11. Type II diabetes mellitus with a hemoglobin A1c of 8.8, new diagnosis.  12. Pulmonary nodule, will require outpatient followup.   13. Hypomagnesmia.   14. Slight hypokalemia.   15. Slight hyponatremia.       Plan:   Disontinued IV Lasix on 2/3 and placed on a Bumex drip. I would continue this until 1800 tonight and then start oral Bumex in the morning. Monitor renal function and electrolytes.        Carvedilol and digoxin have been held secondary to bradycardia and pauses at presentation.  He has been rate controlled atrial fibrillation/flutter on telemetry with no significant bradycardia or pauses recently. I will defer to cardiology on restart of these.       Back on a low-dose of lisinopril and will increase to 5 mg po daily.      Replace magnesium IV and orally. Continue oral potassium.       Continue on Xarelto.      Started on metformin on 2/4. Diabetic educator. Nutrition consult.      PT/OT consults.       Discharge Planning: I expect patient to be discharged to home in 1-2 days.    Will Galo DO   02/05/17   11:04 AM       Electronically signed by Will Galo DO at 2/5/2017 11:26 AM      Selvin Garcia DO at 2/6/2017  1:08 PM  Version 1 of 1             [facility] Medicine Services  INPATIENT PROGRESS NOTE    Length of Stay: 4  Date of Admission: 2/2/2017  Primary Care Physician: No Known Provider    Subjective     Chief Complaint:     Shortness of breath, edema.    HPI     The patient has had an additional 1.5 L  out over the last 24 hours.  He is breathing much better and is no longer short of breath with exertion.  He has transitioned to oral diuretics without difficulty.      Review of Systems       All pertinent negatives and positives are as above. All other systems have been reviewed and are negative unless otherwise stated.     Objective    Temp:  [97 °F (36.1 °C)-98.5 °F (36.9 °C)] 97.1 °F (36.2 °C)  Heart Rate:  [70-91] 91  Resp:  [16-20] 18  BP: (110-139)/(52-91) 124/60    Lab Results (last 24 hours)     Procedure Component Value Units Date/Time    POC Glucose Fingerstick [[id number]]  (Abnormal) Collected:  02/05/17 1607    Specimen:  Blood Updated:  02/05/17 1624     Glucose 176 (H) mg/dL       : [id number] Kenny LisaMeter ID: [id number]       Blood Culture [[id number]]  (Normal) Collected:  02/02/17 1842    Specimen:  Blood from Arm, Left Updated:  02/05/17 1901     Blood Culture No growth at 3 days     Blood Culture [[id number]]  (Normal) Collected:  02/02/17 1842    Specimen:  Blood from Arm, Right Updated:  02/05/17 1901     Blood Culture No growth at 3 days     POC Glucose Fingerstick [[id number]]  (Abnormal) Collected:  02/05/17 1943    Specimen:  Blood Updated:  02/05/17 2013     Glucose 195 (H) mg/dL       : [id number] Doug LisaMeter ID: [id number]       Basic Metabolic Panel [[id number]]  (Abnormal) Collected:  02/06/17 0310    Specimen:  Blood Updated:  02/06/17 0453     Glucose 198 (H) mg/dL      BUN 24 (H) mg/dL      Creatinine 1.21 mg/dL      Sodium 131 (L) mmol/L      Potassium 3.1 (L) mmol/L      Chloride 85 (L) mmol/L      CO2 38.0 (H) mmol/L      Calcium 8.7 mg/dL      eGFR Non African Amer 63 mL/min/1.73      BUN/Creatinine Ratio 19.8      Anion Gap 8.0 mmol/L     Narrative:       GFR Normal >60  Chronic Kidney Disease <60  Kidney Failure <15    Magnesium [[id number]]  (Abnormal) Collected:  02/06/17 0310    Specimen:  Blood Updated:  02/06/17 0453     Magnesium 1.2 (L) mg/dL     POC Glucose  Fingerstick [[id number]]  (Abnormal) Collected:  02/06/17 1143    Specimen:  Blood Updated:  02/06/17 1155     Glucose 188 (H) mg/dL       : [id number] Artem Leyva ID: [id number]             Imaging Results (last 24 hours)     ** No results found for the last 24 hours. **             Intake/Output Summary (Last 24 hours) at 02/06/17 1308  Last data filed at 02/06/17 1139   Gross per 24 hour   Intake   1820 ml   Output   3375 ml   Net  -1555 ml       Physical Exam    Constitutional: He is oriented to person, place, and time. He appears well-developed and well-nourished.   Up in bed, watching TV.  The patient's mother is at bedside.  Head: Normocephalic and atraumatic.   Eyes: Conjunctivae and EOM are normal. Pupils are equal, round, and reactive to light.   Neck: Neck supple. No JVD present. No thyromegaly present.   Cardiovascular: Normal rate, regular rhythm, normal heart sounds and intact distal pulses. Exam reveals no gallop and no friction rub.   No murmur heard.  Pulmonary/Chest: Effort normal. No respiratory distress. He has no wheezes. He has no rales today. He exhibits no tenderness.   Abdominal: Soft. Bowel sounds are normal. He exhibits improved distension. There is no tenderness. There is no rebound and no guarding.   Musculoskeletal: Normal range of motion. He exhibits edema continues to improve (now trace to 1+ of the bilateral lower extremities with chronic stasis dermatitis and dry skin). He exhibits no tenderness or deformity.   Neurological: He is alert and oriented to person, place, and time. He displays normal reflexes. No cranial nerve deficit. He exhibits normal muscle tone.   Skin: Skin is warm and dry. No rash noted.   Bilateral lower extremity stasis dermatitis and very dry skin. Onychomycosis of the nails.   Psychiatric: He has a normal mood and affect. His behavior is normal. Judgment and thought content normal.      Results Review:  I have reviewed the labs, radiology results,  and diagnostic studies since my last progress note and made treatment changes reflective of the results.   I have reviewed the current medications.    Assessment/Plan     Hospital Problem List     * (Principal)Acute on chronic combined systolic and diastolic congestive heart failure    COPD (chronic obstructive pulmonary disease)    Dyslipidemia    Obstructive sleep apnea syndrome    Morbid obesity    Chronic anticoagulation    Overview Signed 2/3/2017  9:15 AM by MATEO Lo     Xarelto         Tobacco abuse    Paroxysmal atrial fibrillation    Megaloblastic anemia    History of alcohol abuse    Microscopic hematuria    Bradycardia    Nonischemic cardiomyopathy    Overview Signed 2/3/2017  9:39 AM by MATEO Lo     Last [facility] 2014          New onset type 2 diabetes mellitus      1. Acute on chronic diastolic heart failure, EF 55%.   2. Moderate pulmonary hypertension.   3. Lower extremity edema and dyspnea.  4. Paroxysmal atrial fibrillation on chronic anticoagulation with Xarelto.   5. Bradycardia, asymptomatic at present.  6. Relative hypotension, improved.  7. Microscopic hematuria with non-obstructing nephrolithiasis.  8. 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   9. Ongoing tobacco abuse with underlying COPD.  10. DONTE on home CPAP.   11. Type II diabetes mellitus with a hemoglobin A1c of 8.8, new diagnosis.  12. Pulmonary nodule, will require outpatient followup.   13. Hypomagnesmia.   14. Slight hypokalemia.   15. Slight hyponatremia.     PLAN:    Probable discharge in a.m.  Continue oral potassium supplementation  One IV run potassium 20 mEq    Selvin Garcia DO   02/06/17   1:08 PM       Electronically signed by Selvin Garcia DO at 2/6/2017  1:12 PM      MATEO Lo at 2/6/2017  1:09 PM  Version 1 of 1    Attestation signed by Curtis Ca MD at 2/6/2017  9:49 PM        I have seen and evaluated this patient personally.  I agree with the findings, assessment and plan as outlined by mid level provider. In addition, I have the following to add.    Face to face encounter:      LOS: 4 days   Patient Care Team:  No Known Provider as PCP - General    Chief Complaint: Shortness of breath  Subjective  Feeling  better  No chest pain   No excessive shortness of breath  No new complaints    Interval History: Improved overall    Patient Complaints:   Denies chest pain currently. Denies excessive shortness of breath. Denies abdominal pain, nausea vomiting or diarrhoea.    Telemetry: no malignant arrhythmia. No significant pauses.    Review of Systems:    The following systems were reviewed and negative; ENT, respiratory,  gastrointestinal, integument, hematologic / lymphatic, neurological, behavioral/psych and allergies / immunologic    Labs:    WBC No results found for: WBC   HGB No results found for: HGB   HCT No results found for: HCT   Platlets No results found for: PLT   MCV No results found for: MCV     Results from last 7 days  Lab Units 02/06/17  0310 02/05/17  0659 02/04/17  0727  02/02/17  1347   SODIUM mmol/L 131* 133* 135  < > 134*   POTASSIUM mmol/L 3.1* 3.4* 3.5  < > 3.9   CHLORIDE mmol/L 85* 87* 94*  < > 98   TOTAL CO2 mmol/L 38.0* 39.0* 33.0*  < > 30.0   BUN mg/dL 24* 21 21  < > 26*   CREATININE mg/dL 1.21 1.11 1.12  < > 1.19   CALCIUM mg/dL 8.7 8.4 8.6  < > 8.7   BILIRUBIN mg/dL  --   --   --   --  1.0   ALK PHOS U/L  --   --   --   --  59   ALT (SGPT) U/L  --   --   --   --  40   AST (SGOT) U/L  --   --   --   --  15   GLUCOSE mg/dL 198* 180* 175*  < > 150*   < > = values in this interval not displayed.  Lab Results   Component Value Date    CKMB 0.70 07/12/2014    TROPONINI 0.029 07/12/2014     PT/INR:  No results found for: PROTIME/No results found for: INR    Imaging Results (last 72 hours)     Procedure Component Value Units Date/Time    CT Abdomen Pelvis Without Contrast [[id number]] Collected:  02/04/17 0856     Updated:   02/04/17 0945    Narrative:       CT ABDOMEN AND PELVIS WITHOUT CONTRAST 2/4/2017 8:30 AM CST     HISTORY: Systolic heart failure, hematuria     COMPARISON: None      DLP: 1643 mGy cm     In order to have a CT radiation dose as low as reasonably achievable,  Automated Exposure Control was utilized for adjustment of the mA and/or  KV according to patient size.     TECHNIQUE: Noncontrast enhanced images of the abdomen and pelvis  obtained without oral contrast.      FINDINGS:   Small nodular area along a linear focus of density in the LEFT lung base  (axial image 11) measures up to 7 mm. Follow-up CT chest is recommended  in 6 months for reevaluation.      LIVER: No focal liver lesion.      BILIARY SYSTEM: The gallbladder is unremarkable. No intrahepatic or  extrahepatic ductal dilatation.      PANCREAS: No focal pancreatic lesion.      SPLEEN: Some calcifications in the spleen suggest old granulomatous  change.      KIDNEYS AND ADRENALS: The adrenal glands are unremarkable.  There are  innumerable nonobstructing nephroliths bilaterally. The largest on the  RIGHT is at the inferior pole and measures up to 9 mm. The largest on  the LEFT is also at the inferior pole and measures up to 7 mm. The  ureters are decompressed.     RETROPERITONEUM: No mass, lymphadenopathy or hemorrhage.      GI TRACT: There is diverticulosis without diverticulitis. There is mild  constipation. There is no evidence of bowel obstruction. The appendix  has a normal appearance. There is ingested material within the stomach.     OTHER: There is no mesenteric mass, lymphadenopathy or fluid collection.  Degenerative changes are seen throughout the lumbar spine. There is  transitional anatomy at the lumbosacral junction on the RIGHT.          PELVIS: No mass lesion, fluid collection or significant lymphadenopathy  is seen in the pelvis. The urinary bladder is normal in appearance.       Impression:       1. No evidence of obstructive uropathy. There  are numerous bilateral  nonobstructing nephroliths.  2. There is a 7 mm nodular density at the LEFT lung base. Follow-up CT  scan in 6 months is recommended per the below attached guidelines.   3. Diverticulosis without diverticulitis.        Fleischner Society Recommendations for Management of SOLID Pulmonary  Nodules:     1.  If a nodule is less than or equal to 4 mm in size, low risk patients  require no follow up, and high risk patients require a follow-up CT of  the chest at 12 months.  2.  If a nodule is 5-6 mm in size, low risk patients require a follow-up  CT at 12 months, and high risk patients require follow up CT scans at  6-12 months and 18-24 months.  3.  If a nodule is 7-8 mm in size, low risk patients requiring follow-up  at 6-12 months and 18-24 months, and high risk patients requiring  follow-up at 3-6 months, 9-12 months and 24 months.  4.  If a nodule is greater than 8 mm in size, ALL patients require  follow-up at 3, 9 and 24 months. PET/CT or biopsy should also be  considered.  This report was finalized on 02/04/2017 09:43 by Dr. Tyron Ravi MD.          Objective     Medication Review:   Current Facility-Administered Medications   Medication Dose Route Frequency Provider Last Rate Last Dose   • acetaminophen (TYLENOL) tablet 650 mg  650 mg Oral Q4H PRN Will Galo DO       • acetaZOLAMIDE (DIAMOX) tablet 250 mg  250 mg Oral BID Will Galo DO   250 mg at 02/06/17 1849   • budesonide-formoterol (SYMBICORT) 160-4.5 MCG/ACT inhaler 2 puff  2 puff Inhalation BID - RT Will Galo DO   2 puff at 02/06/17 2005   • bumetanide (BUMEX) tablet 1 mg  1 mg Oral BID Will Galo DO   1 mg at 02/06/17 1849   • carvedilol (COREG) tablet 3.125 mg  3.125 mg Oral BID With Meals MATEO Lo   3.125 mg at 02/06/17 1605   • colchicine tablet 0.6 mg  0.6 mg Oral Daily Will Galo DO   0.6 mg at 02/06/17 0908   • dextrose (D50W) solution 25 g  25 g Intravenous Q15 Min PRN Will C  "Clover DO       • dextrose (GLUTOSE) oral gel 15 g  15 g Oral Q15 Min PRN Will Galo DO       • glucagon (human recombinant) (GLUCAGEN DIAGNOSTIC) injection 1 mg  1 mg Subcutaneous Q15 Min PRN Will Galo DO       • HYDROcodone-acetaminophen (NORCO) 5-325 MG per tablet 1 tablet  1 tablet Oral Q6H PRN Will Galo DO   1 tablet at 02/05/17 1712   • insulin lispro (humaLOG) injection 2-7 Units  2-7 Units Subcutaneous 4x Daily AC & at Bedtime Will Galo DO   3 Units at 02/06/17 2115   • lisinopril (PRINIVIL,ZESTRIL) tablet 5 mg  5 mg Oral Q24H Will Galo DO   5 mg at 02/06/17 0908   • magnesium oxide (MAGOX) tablet 400 mg  400 mg Oral BID Will Galo DO   400 mg at 02/06/17 1851   • metFORMIN (GLUCOPHAGE) tablet 500 mg  500 mg Oral BID With Meals Will Galo DO   500 mg at 02/06/17 1849   • ondansetron (ZOFRAN) injection 4 mg  4 mg Intravenous Q6H PRN Will Galo DO       • potassium chloride (MICRO-K) CR capsule 40 mEq  40 mEq Oral BID With Meals Will Galo DO   40 mEq at 02/06/17 1849   • rivaroxaban (XARELTO) tablet 20 mg  20 mg Oral Daily Will Galo DO   20 mg at 02/06/17 0908   • sodium chloride 0.9 % flush 1-10 mL  1-10 mL Intravenous PRN Will Galo DO       • sodium chloride 0.9 % flush 10 mL  10 mL Intravenous PRN MATEO Carbajal       • spironolactone (ALDACTONE) tablet 50 mg  50 mg Oral Daily Will Galo DO   50 mg at 02/06/17 1034   • tiotropium (SPIRIVA) 18 MCG per inhalation capsule 1 capsule  1 capsule Inhalation Daily - RT Will Galo DO   1 capsule at 02/06/17 0652       Vital Sign Min/Max for last 24 hours  Temp  Min: 97 °F (36.1 °C)  Max: 98.5 °F (36.9 °C)   BP  Min: 94/48  Max: 130/60   Pulse  Min: 70  Max: 98   Resp  Min: 16  Max: 20   SpO2  Min: 91 %  Max: 92 %   Flow (L/min)  Min: 2  Max: 3   Weight  Min: 372 lb (169 kg)  Max: 372 lb (169 kg)     Flowsheet Rows         First Filed Value    Admission Height  71\" (180.3 cm) " Documented at 02/02/2017 1253    Admission Weight  (!)  380 lb (172 kg) Documented at 02/02/2017 1253          Physical Exam:  Ears ears appear intact with no abnormalities noted  Nose nares normal, septum midline, mucosa normal and no drainage  Neck suppple, trachea midline, no thyromegaly, no carotid bruit and no JVD  Back no kyphosis present, no scoliosis present, no skin lesions, erythema, or scars, no tenderness to percussion or palpation and range of motion normal  Lungs respirations regular, respirations even and respirations unlabored  Heart normal S1, S2,  2/6 pansystolic murmur in the left sternal border,   no rub and no click  Abdomen normal bowel sounds, no masses, no hepatomegaly, no splenomegaly, no guarding and no rebound tenderness  Skin no bleeding, bruising or rash     Results Review:   I reviewed the patient's new clinical results.  I reviewed the patient's new imaging results and agree with the interpretation.  I reviewed the patient's other test results and agree with the interpretation  I personally viewed and interpreted the patient's EKG/Telemetry data  Discussed with patient    Medication Review: Performed    Assessment/Plan     Principal Problem:    Acute on chronic combined systolic and diastolic congestive heart failure  Active Problems:    COPD (chronic obstructive pulmonary disease)    Dyslipidemia    Obstructive sleep apnea syndrome    Morbid obesity    Chronic anticoagulation    Tobacco abuse    Paroxysmal atrial fibrillation    Megaloblastic anemia    History of alcohol abuse    Microscopic hematuria    Bradycardia    Nonischemic cardiomyopathy (cath 2014 EF 20% with no obstructive CAD) Recent echo normal LVEF LVDD    New onset type 2 diabetes mellitus    Plan  Treat and monitor hypoglycemia  CPAP  Ongoing diuresis  Same treatment  Supportive care  Telemetry  Optimal medical therapy  Deep vein thrombosis prophylaxis/precautions  Low salt cardiac diet  Avoid NSAIDS  OK to discharge    See me in 1 month    Curtis Ca MD  02/06/17  9:46 PM                                     [facility] -  Progress Note     LOS: 4 days   Patient Care Team:  No Known Provider as PCP - General      Reason for consultation: CHF     Subjective     Interval History:   feels that he needs some rest- he has not been able to rest since starting the Bumex gtt due to frequent urination. He is feeling significantly better since start Bumex gtt.      Review of Systems:   Review of Systems   Constitution: Positive for weakness and malaise/fatigue. Negative for diaphoresis and fever.   Cardiovascular: Positive for dyspnea on exertion and leg swelling (improving). Negative for chest pain, irregular heartbeat, near-syncope, palpitations, paroxysmal nocturnal dyspnea and syncope.   Respiratory: Positive for shortness of breath (improving) and sputum production (white). Negative for wheezing.    Skin: Negative for rash.   Musculoskeletal: Negative for falls.   Gastrointestinal: Negative for bloating, abdominal pain, nausea and vomiting.   Psychiatric/Behavioral: Negative for altered mental status.        Objective     Vital Sign Min/Max for last 24 hours  Temp  Min: 97 °F (36.1 °C)  Max: 98.5 °F (36.9 °C)   BP  Min: 110/52  Max: 139/83   Pulse  Min: 70  Max: 91   Resp  Min: 16  Max: 20   SpO2  Min: 91 %  Max: 92 %   Flow (L/min)  Min: 2  Max: 3   Weight  Min: 372 lb (169 kg)  Max: 372 lb 9.6 oz (169 kg)     Last Weight    02/06/17  0938   Weight: (!) 372 lb (169 kg)         Intake/Output Summary (Last 24 hours) at 02/06/17 1312  Last data filed at 02/06/17 1139   Gross per 24 hour   Intake   1820 ml   Output   3375 ml   Net  -1555 ml         Physical Exam:  Physical Exam   Constitutional: He is oriented to person, place, and time. He appears well-developed and well-nourished. He is cooperative. No distress. Nasal cannula in place.   Resting comfortably in bed. Obese.    HENT:   Head: Normocephalic and  atraumatic.   Cardiovascular: Normal rate, S2 normal and intact distal pulses.  An irregularly irregular rhythm present. Frequent extrasystoles are present. Exam reveals distant heart sounds.  murmur heard.  Telemetry: A-Fib up to107 BPM, no pauses or bradycardia  overnight or this morning, multifocal PVCs, couplets, trigeminy.    Pulmonary/Chest: Effort normal and breath sounds normal. No accessory muscle usage. No respiratory distress.   Abdominal: Soft. Bowel sounds are normal. He exhibits no distension. There is no tenderness.   Musculoskeletal: He exhibits edema (2+ BLE pitting edema- improved compared to yesterday).   Neurological: He is alert and oriented to person, place, and time.   Skin: Skin is warm and dry. No rash noted. He is not diaphoretic.   Psychiatric: He has a normal mood and affect. His speech is normal and behavior is normal. reviewed.       Results Review:   Lab Results   Component Value Date    WBC 5.85 02/03/2017    HGB 16.2 02/03/2017    HCT 52.1 (H) 02/03/2017    .7 (H) 02/03/2017     (L) 02/03/2017     Lab Results   Component Value Date    GLUCOSE 198 (H) 02/06/2017    CALCIUM 8.7 02/06/2017     (L) 02/06/2017    K 3.1 (L) 02/06/2017    CO2 38.0 (H) 02/06/2017    CL 85 (L) 02/06/2017    BUN 24 (H) 02/06/2017    CREATININE 1.21 02/06/2017    EGFRIFNONA 63 02/06/2017    BCR 19.8 02/06/2017    ANIONGAP 8.0 02/06/2017        Echo EF Estimated  Lab Results   Component Value Date    ECHOEFEST 55 02/03/2017       Medication Review: yes  Current Facility-Administered Medications   Medication Dose Route Frequency Provider Last Rate Last Dose   • acetaminophen (TYLENOL) tablet 650 mg  650 mg Oral Q4H PRN Will Galo DO       • acetaZOLAMIDE (DIAMOX) tablet 250 mg  250 mg Oral BID Will Galo DO   250 mg at 02/06/17 0908   • budesonide-formoterol (SYMBICORT) 160-4.5 MCG/ACT inhaler 2 puff  2 puff Inhalation BID - RT Will Galo DO   2 puff at 02/06/17 0659   •  bumetanide (BUMEX) tablet 1 mg  1 mg Oral BID Will Galo DO   1 mg at 02/06/17 0908   • colchicine tablet 0.6 mg  0.6 mg Oral Daily Will Galo DO   0.6 mg at 02/06/17 0908   • dextrose (D50W) solution 25 g  25 g Intravenous Q15 Min PRN Will Galo DO       • dextrose (GLUTOSE) oral gel 15 g  15 g Oral Q15 Min PRN Will Galo DO       • glucagon (human recombinant) (GLUCAGEN DIAGNOSTIC) injection 1 mg  1 mg Subcutaneous Q15 Min PRN Will Galo DO       • HYDROcodone-acetaminophen (NORCO) 5-325 MG per tablet 1 tablet  1 tablet Oral Q6H PRN Will Galo DO   1 tablet at 02/05/17 1712   • insulin lispro (humaLOG) injection 2-7 Units  2-7 Units Subcutaneous 4x Daily AC & at Bedtime Will Galo DO   2 Units at 02/06/17 0907   • lisinopril (PRINIVIL,ZESTRIL) tablet 5 mg  5 mg Oral Q24H Will Galo DO   5 mg at 02/06/17 0908   • magnesium oxide (MAGOX) tablet 400 mg  400 mg Oral BID Will Galo DO   400 mg at 02/06/17 0908   • metFORMIN (GLUCOPHAGE) tablet 500 mg  500 mg Oral BID With Meals Will Galo DO   500 mg at 02/06/17 0908   • ondansetron (ZOFRAN) injection 4 mg  4 mg Intravenous Q6H PRN Will Galo DO       • potassium chloride (MICRO-K) CR capsule 40 mEq  40 mEq Oral BID With Meals Will Galo DO   40 mEq at 02/06/17 0908   • potassium chloride 20 mEq in 100 mL IVPB  20 mEq Intravenous Once Selvin Garcia, DO       • rivaroxaban (XARELTO) tablet 20 mg  20 mg Oral Daily Will Galo DO   20 mg at 02/06/17 0908   • sodium chloride 0.9 % flush 1-10 mL  1-10 mL Intravenous PRN Will Galo DO       • sodium chloride 0.9 % flush 10 mL  10 mL Intravenous PRN Kim M Barnard, APRN       • spironolactone (ALDACTONE) tablet 50 mg  50 mg Oral Daily Will Galo DO   50 mg at 02/06/17 1034   • tiotropium (SPIRIVA) 18 MCG per inhalation capsule 1 capsule  1 capsule Inhalation Daily - RT Will Galo DO   1 capsule at 02/06/17 0652         Assessment&#47;Plan      Principal Problem:    Acute on chronic combined systolic and diastolic congestive heart failure- significant diuresis since starting Bumex gtt.     Active Problems:    Paroxysmal atrial fibrillation, rate controlled. No pauses or bradycardia overnight.     Nonischemic cardiomyopathy    Chronic anticoagulation with Xarelto     Microscopic hematuria    Bradycardia    New onset type 2 diabetes mellitus    COPD (chronic obstructive pulmonary disease)    Dyslipidemia    Obstructive sleep apnea syndrome- compliant with home CPAP     Morbid obesity    Tobacco abuse    Megaloblastic anemia    History of alcohol abuse    Hyponatremia    Hypokalemia       Plan   1. Strict I&O, daily weight, low sodium diet  2. Bumex gtt discontinued by primary   3. Anticoagulated with Xarelto- monitor for abnormal bleedings. Avoid NSAIDS.   4. Monitor telemetry   5. BMP in am   6. K+ and Mag replacement per primary   7. Continue Lisinopril.  Restart low dose Coreg   9. Will need close CHF follow up at discharge.       Nila Bianchi, MATEO  02/06/17  1:12 PM                 Electronically signed by Curtis Ca MD at 2/6/2017  9:49 PM

## 2017-02-08 NOTE — PROGRESS NOTES
Face to face encounter: 11: 45 AM     LOS: 5 days   Patient Care Team:  No Known Provider as PCP - General    Chief Complaint: Shortness of breath  Subjective  Feeling  better  No chest pain   No excessive shortness of breath  No new complaints  Leg swelling much better    Interval History: Improved overall    Patient Complaints:   Denies chest pain currently. Denies excessive shortness of breath. Denies abdominal pain, nausea vomiting or diarrhoea.    Telemetry: no malignant arrhythmia. No significant pauses.    Review of Systems:    The following systems were reviewed and negative; ENT, respiratory,  gastrointestinal, integument, hematologic / lymphatic, neurological, behavioral/psych and allergies / immunologic    Labs:    WBC No results found for: WBC   HGB No results found for: HGB   HCT No results found for: HCT   Platlets No results found for: PLT   MCV No results found for: MCV     Results from last 7 days  Lab Units 02/06/17  0310 02/05/17  0659 02/04/17  0727  02/02/17  1347   SODIUM mmol/L 131* 133* 135  < > 134*   POTASSIUM mmol/L 3.1* 3.4* 3.5  < > 3.9   CHLORIDE mmol/L 85* 87* 94*  < > 98   TOTAL CO2 mmol/L 38.0* 39.0* 33.0*  < > 30.0   BUN mg/dL 24* 21 21  < > 26*   CREATININE mg/dL 1.21 1.11 1.12  < > 1.19   CALCIUM mg/dL 8.7 8.4 8.6  < > 8.7   BILIRUBIN mg/dL  --   --   --   --  1.0   ALK PHOS U/L  --   --   --   --  59   ALT (SGPT) U/L  --   --   --   --  40   AST (SGOT) U/L  --   --   --   --  15   GLUCOSE mg/dL 198* 180* 175*  < > 150*   < > = values in this interval not displayed.  Lab Results   Component Value Date    CKMB 0.70 07/12/2014    TROPONINI 0.029 07/12/2014     PT/INR:  No results found for: PROTIME/No results found for: INR    Imaging Results (last 72 hours)     ** No results found for the last 72 hours. **          Objective     Medication Review:   No current facility-administered medications for this encounter.      Current Outpatient Prescriptions   Medication Sig Dispense  "Refill   • acetaZOLAMIDE (DIAMOX) 500 MG capsule Take 250 mg by mouth Daily.     • colchicine 0.6 MG tablet Take 0.6 mg by mouth daily.     • digoxin (LANOXIN) 125 MCG tablet Take 250 mcg by mouth Daily. DOSE UNKNOWN         • rivaroxaban (XARELTO) 20 MG tablet Take 20 mg by mouth daily.     • budesonide-formoterol (SYMBICORT) 80-4.5 MCG/ACT inhaler Inhale 2 puffs 2 (two) times a day.     • [START ON 2/8/2017] bumetanide (BUMEX) 1 MG tablet Take 1 tablet by mouth Daily. 30 tablet 0   • carvedilol (COREG) 3.125 MG tablet Take 1 tablet by mouth 2 (Two) Times a Day With Meals. 60 tablet 0   • lisinopril (PRINIVIL,ZESTRIL) 5 MG tablet Take 5 mg by mouth daily.     • magnesium oxide (MAGOX) 400 (241.3 MG) MG tablet tablet Take 1 tablet by mouth Daily. 30 each 0   • metFORMIN (GLUCOPHAGE) 500 MG tablet Take 1 tablet by mouth 2 (Two) Times a Day With Meals. 60 tablet 0   • potassium chloride (MICRO-K) 10 MEQ CR capsule Take 2 capsules by mouth Daily. 60 capsule 0   • spironolactone (ALDACTONE) 50 MG tablet Take 50 mg by mouth daily.     • tiotropium (SPIRIVA) 18 MCG per inhalation capsule Place 1 capsule into inhaler and inhale 1 (one) time daily.         Vital Sign Min/Max for last 24 hours  Temp  Min: 97.8 °F (36.6 °C)  Max: 98.4 °F (36.9 °C)   BP  Min: 107/63  Max: 121/55   Pulse  Min: 72  Max: 80   Resp  Min: 18  Max: 18   SpO2  Min: 90 %  Max: 94 %   Flow (L/min)  Min: 2  Max: 2   Weight  Min: 371 lb 8 oz (169 kg)  Max: 371 lb 8 oz (169 kg)     Flowsheet Rows         First Filed Value    Admission Height  71\" (180.3 cm) Documented at 02/02/2017 1253    Admission Weight  (!)  380 lb (172 kg) Documented at 02/02/2017 1253          Physical Exam:  Ears ears appear intact with no abnormalities noted  Nose nares normal, septum midline, mucosa normal and no drainage  Neck suppple, trachea midline, no thyromegaly, no carotid bruit and no JVD  Back no kyphosis present, no scoliosis present, no skin lesions, erythema, or " scars, no tenderness to percussion or palpation and range of motion normal  Lungs respirations regular, respirations even and respirations unlabored  Heart normal S1, S2,  2/6 pansystolic murmur in the left sternal border,   no rub and no click  Abdomen normal bowel sounds, no masses, no hepatomegaly, no splenomegaly, no guarding and no rebound tenderness  Skin no bleeding, bruising or rash  LE 1 plus pitting edema     Results Review:   I reviewed the patient's new clinical results.  I reviewed the patient's new imaging results and agree with the interpretation.  I reviewed the patient's other test results and agree with the interpretation  I personally viewed and interpreted the patient's EKG/Telemetry data  Discussed with patient    Medication Review: Performed    Assessment/Plan     Principal Problem:    Acute on chronic combined systolic and diastolic congestive heart failure  Active Problems:    COPD (chronic obstructive pulmonary disease)    Dyslipidemia    Obstructive sleep apnea syndrome    Morbid obesity    Chronic anticoagulation    Tobacco abuse    Paroxysmal atrial fibrillation    Megaloblastic anemia    History of alcohol abuse    Microscopic hematuria    Bradycardia    Nonischemic cardiomyopathy    New onset type 2 diabetes mellitus    Plan  OK to discharge  Monitor outpatient electrolytes  Optimal medical therapy  Deep vein thrombosis /precautions, ambulate  Counseled regarding disease appropriate diet, fluid, sodium, caffeine, stimulants intake   Stressed compliance to diet and medications   Avoid NSAIDS  Keep f/u with me  Curtis Ca MD  02/07/17  10:29 PM

## 2017-02-14 ENCOUNTER — OFFICE VISIT (OUTPATIENT)
Dept: INTERNAL MEDICINE | Facility: CLINIC | Age: 52
End: 2017-02-14

## 2017-02-14 VITALS
HEART RATE: 95 BPM | BODY MASS INDEX: 44.1 KG/M2 | HEIGHT: 71 IN | RESPIRATION RATE: 16 BRPM | WEIGHT: 315 LBS | OXYGEN SATURATION: 98 % | SYSTOLIC BLOOD PRESSURE: 138 MMHG | DIASTOLIC BLOOD PRESSURE: 88 MMHG

## 2017-02-14 DIAGNOSIS — E11.42 TYPE 2 DIABETES MELLITUS WITH DIABETIC POLYNEUROPATHY, WITHOUT LONG-TERM CURRENT USE OF INSULIN (HCC): ICD-10-CM

## 2017-02-14 DIAGNOSIS — I48.0 PAROXYSMAL ATRIAL FIBRILLATION (HCC): ICD-10-CM

## 2017-02-14 DIAGNOSIS — E66.01 MORBID OBESITY DUE TO EXCESS CALORIES (HCC): ICD-10-CM

## 2017-02-14 DIAGNOSIS — Z72.0 TOBACCO ABUSE: ICD-10-CM

## 2017-02-14 DIAGNOSIS — E78.2 MIXED HYPERLIPIDEMIA: ICD-10-CM

## 2017-02-14 DIAGNOSIS — I50.42 CHRONIC COMBINED SYSTOLIC AND DIASTOLIC CONGESTIVE HEART FAILURE (HCC): Primary | ICD-10-CM

## 2017-02-14 DIAGNOSIS — J44.9 CHRONIC OBSTRUCTIVE PULMONARY DISEASE, UNSPECIFIED COPD TYPE (HCC): ICD-10-CM

## 2017-02-14 PROCEDURE — 99205 OFFICE O/P NEW HI 60 MIN: CPT | Performed by: INTERNAL MEDICINE

## 2017-02-14 RX ORDER — CARVEDILOL 3.12 MG/1
3.12 TABLET ORAL 2 TIMES DAILY WITH MEALS
Qty: 60 TABLET | Refills: 0 | Status: SHIPPED | OUTPATIENT
Start: 2017-02-14

## 2017-02-14 RX ORDER — DIGOXIN 125 MCG
250 TABLET ORAL
Qty: 60 TABLET | Refills: 5 | Status: SHIPPED | OUTPATIENT
Start: 2017-02-14

## 2017-02-14 RX ORDER — GABAPENTIN 300 MG/1
300 CAPSULE ORAL 3 TIMES DAILY
Qty: 90 CAPSULE | Refills: 3 | Status: SHIPPED | OUTPATIENT
Start: 2017-02-14

## 2017-02-14 RX ORDER — BUDESONIDE AND FORMOTEROL FUMARATE DIHYDRATE 80; 4.5 UG/1; UG/1
2 AEROSOL RESPIRATORY (INHALATION)
Qty: 10.2 G | Refills: 11 | Status: SHIPPED | OUTPATIENT
Start: 2017-02-14

## 2017-02-14 RX ORDER — POTASSIUM CHLORIDE 750 MG/1
20 CAPSULE, EXTENDED RELEASE ORAL DAILY
Qty: 60 CAPSULE | Refills: 0 | Status: SHIPPED | OUTPATIENT
Start: 2017-02-14

## 2017-02-14 RX ORDER — BUMETANIDE 1 MG/1
1 TABLET ORAL DAILY
Qty: 30 TABLET | Refills: 5 | Status: SHIPPED | OUTPATIENT
Start: 2017-02-14

## 2017-02-14 RX ORDER — LISINOPRIL 5 MG/1
5 TABLET ORAL DAILY
Qty: 30 TABLET | Refills: 5 | Status: SHIPPED | OUTPATIENT
Start: 2017-02-14

## 2017-02-14 RX ORDER — SPIRONOLACTONE 50 MG/1
50 TABLET, FILM COATED ORAL DAILY
Qty: 30 TABLET | Refills: 5 | Status: SHIPPED | OUTPATIENT
Start: 2017-02-14

## 2017-02-14 NOTE — PROGRESS NOTES
CC: hospital follow-up related to heart failure exacerbation    History:  Donte Bishop is a 51 y.o. male who presents today for evaluation of the above problems.  He reports he has been doing very well and has been recovering from recent hospitalization. Records were reviewed and summarized: Pt has an extended history of nonischemic cardiomyopathy with a documented EF as low as ~30% in 2014. However, he has been optimally controlled on medical therapy under the care of Dr. Ca. He was admitted with worsening dyspnea and he had hypotension that necessitated stopping several of his medications for a short time. He was successfully diuresed and Echo during admission showed normal EF with only mild diastolic dysfunction. He was restarted on his meds and has been doing very well since discharge. He has no side effects on his medication. Echo, EKG showing a fib with bradycardia, negative duplex BLE and laboratories were reviewed by me. BNP was >5000 on admission. A1c was 8.8% while admitted. Hyponatremia >130 was present, but stable.     He reports his sugars have been between 180 and 230 since being at home and he has been avoiding carbohydrates as tolerated and has also been taking metformin, which was just started on discharge. He has been on medications in the past, but was able to get off of them in the past. He has significant neuropathy in the BLE and requests Rx for diabetic shoes. He reports his breathing has been well controlled and he has continued on Xarelto for his a fib without significant bleeding events. He denies any anginal symptoms. He does continue on inhaler therapies for his COPD and is pleased with the control on these. He is not on O2 therapy chronically.    ROS:  Review of Systems   Constitutional: Negative for chills and fever.   HENT: Negative for congestion and sore throat.    Eyes: Negative for visual disturbance.   Respiratory: Negative for cough and shortness of breath.     Cardiovascular: Positive for leg swelling. Negative for chest pain and palpitations.   Gastrointestinal: Negative for abdominal pain, constipation and nausea.   Endocrine: Negative for cold intolerance and heat intolerance.   Genitourinary: Negative for difficulty urinating and frequency.   Musculoskeletal: Negative for arthralgias and back pain.   Skin: Positive for rash.   Neurological: Positive for numbness. Negative for dizziness and headaches.   Psychiatric/Behavioral: Negative for dysphoric mood. The patient is not nervous/anxious.        No Known Allergies  Past Medical History   Diagnosis Date   • Alcohol abuse, in remission      Quit 12/2016   • Anemia    • Arthritis    • Atrial fibrillation    • Cardiomegaly    • CHF (NYHA class I, ACC/AHA stage B)    • Chronic anticoagulation      Xarelto    • COPD (chronic obstructive pulmonary disease)    • DVT (deep venous thrombosis)    • New onset type 2 diabetes mellitus 2/3/2017   • Obesity    • S/P cardiac cath    • Severe left ventricular systolic dysfunction    • Sleep apnea    • Tobacco abuse      1/2 PPD      Past Surgical History   Procedure Laterality Date   • Eye surgery     • Fracture surgery     • Fracture surgery Left      left arm. has screws  and pins   • Cardiac catheterization  2014     Nonischemic Cardiomyopathy      Family History   Problem Relation Age of Onset   • Heart disease Mother    • Heart disease Father    • Heart disease Maternal Grandmother    • Heart disease Maternal Grandfather    • Heart disease Paternal Grandmother    • Heart disease Paternal Grandfather       reports that he has been smoking.  He has been smoking about 0.50 packs per day. He does not have any smokeless tobacco history on file. He reports that he does not drink alcohol or use illicit drugs.      Current Outpatient Prescriptions:   •  acetaZOLAMIDE (DIAMOX) 500 MG capsule, Take 250 mg by mouth Daily., Disp: , Rfl:   •  budesonide-formoterol (SYMBICORT) 80-4.5  "MCG/ACT inhaler, Inhale 2 puffs 2 (two) times a day., Disp: , Rfl:   •  bumetanide (BUMEX) 1 MG tablet, Take 1 tablet by mouth Daily., Disp: 30 tablet, Rfl: 0  •  carvedilol (COREG) 3.125 MG tablet, Take 1 tablet by mouth 2 (Two) Times a Day With Meals., Disp: 60 tablet, Rfl: 0  •  colchicine 0.6 MG tablet, Take 0.6 mg by mouth daily., Disp: , Rfl:   •  digoxin (LANOXIN) 125 MCG tablet, Take 250 mcg by mouth Daily. DOSE UNKNOWN   , Disp: , Rfl:   •  lisinopril (PRINIVIL,ZESTRIL) 5 MG tablet, Take 5 mg by mouth daily., Disp: , Rfl:   •  magnesium oxide (MAGOX) 400 (241.3 MG) MG tablet tablet, Take 1 tablet by mouth Daily., Disp: 30 each, Rfl: 0  •  metFORMIN (GLUCOPHAGE) 500 MG tablet, Take 1 tablet by mouth 2 (Two) Times a Day With Meals., Disp: 60 tablet, Rfl: 0  •  potassium chloride (MICRO-K) 10 MEQ CR capsule, Take 2 capsules by mouth Daily., Disp: 60 capsule, Rfl: 0  •  rivaroxaban (XARELTO) 20 MG tablet, Take 20 mg by mouth daily., Disp: , Rfl:   •  spironolactone (ALDACTONE) 50 MG tablet, Take 50 mg by mouth daily., Disp: , Rfl:   •  tiotropium (SPIRIVA) 18 MCG per inhalation capsule, Place 1 capsule into inhaler and inhale 1 (one) time daily., Disp: , Rfl:     OBJECTIVE:  Visit Vitals   • /88 (BP Location: Left arm, Patient Position: Sitting, Cuff Size: Adult)   • Pulse 95   • Resp 16   • Ht 71\" (180.3 cm)   • Wt (!) 368 lb (167 kg)   • SpO2 98%   • BMI 51.33 kg/m2      Physical Exam   Constitutional: He is oriented to person, place, and time. He appears well-developed and well-nourished. No distress.   HENT:   Head: Normocephalic and atraumatic.   Right Ear: External ear normal.   Left Ear: External ear normal.   Nose: Nose normal.   Mouth/Throat: Oropharynx is clear and moist. No oropharyngeal exudate.   Eyes: EOM are normal. No scleral icterus.   Neck: Normal range of motion. Neck supple. No tracheal deviation present.   Cardiovascular: Normal rate and regular rhythm.  Exam reveals distant heart " sounds. Exam reveals no gallop.    No murmur heard.  Pulmonary/Chest: Effort normal and breath sounds normal. No accessory muscle usage. No respiratory distress. He has no wheezes.   Abdominal: Soft. Bowel sounds are normal. He exhibits no distension. There is no tenderness.   Musculoskeletal: Normal range of motion.    Donte had a diabetic foot exam performed today.   During the foot exam he had a monofilament test performed.    Neurological Sensory Findings -  Right ankle/foot altered proprioception (diffuse and total loss of proprioception to monofilamenit in the soles and toes with intact sensation in the dorsal foot and superior) and left ankle/foot altered proprioception (diffuse and total loss of proprioception to monofilamenit in the soles and toes with intact sensation in the dorsal foot and superior).    Vascular Status -  His exam exhibits right foot vasculature normal and right foot edema (1+). His exam exhibits left foot vasculature normal.Left foot edema: 1+   Skin Integrity  -  His right foot skin is intact.     Donte 's left foot skin is intact. .  Lymphadenopathy:     He has no cervical adenopathy.   Neurological: He is alert and oriented to person, place, and time. Coordination and gait normal.   Skin: Skin is warm and dry. No cyanosis. Nails show no clubbing.   No jaundice   Psychiatric: He has a normal mood and affect. His mood appears not anxious. He does not exhibit a depressed mood.       Assessment/Plan    Diagnoses and all orders for this visit:    Chronic combined systolic and diastolic congestive heart failure  -     magnesium oxide (MAGOX) 400 (241.3 MG) MG tablet tablet; Take 1 tablet by mouth Daily.  -     bumetanide (BUMEX) 1 MG tablet; Take 1 tablet by mouth Daily.  -     carvedilol (COREG) 3.125 MG tablet; Take 1 tablet by mouth 2 (Two) Times a Day With Meals.  -     digoxin (LANOXIN) 125 MCG tablet; Take 2 tablets by mouth Daily.  -     lisinopril (PRINIVIL,ZESTRIL) 5 MG tablet;  Take 1 tablet by mouth Daily.  -     spironolactone (ALDACTONE) 50 MG tablet; Take 1 tablet by mouth Daily.  -     potassium chloride (MICRO-K) 10 MEQ CR capsule; Take 2 capsules by mouth Daily.  Continue medications as above. This represents excellent medical management for his systolic and diastolic dysfunction with history of EF ~30%, though recent echo shows much better control. He is currently euvolemic and without evidence for exacerbation. He reports significant UOP with Bumex.      Morbid obesity due to excess calories  Recommended portion control and being careful of the types and timing of his meals. He would be especially mindful of carb, Na+ and fluid intake.    Chronic obstructive pulmonary disease, unspecified COPD type  -     Tiotropium Bromide Monohydrate 1.25 MCG/ACT aerosol solution; Inhale 2 puffs Daily.  -     budesonide-formoterol (SYMBICORT) 80-4.5 MCG/ACT inhaler; Inhale 2 puffs 2 (Two) Times a Day.  Stable and will continue inhalers as above.    Tobacco abuse  Recommended complete cessation, which he acknowledges the need for.    Mixed hyperlipidemia  Will obtain lipid panel at future appointment and use 10 year ASCVD risk to guide the need for pharmacologic therapy.    Type 2 diabetes mellitus with diabetic polyneuropathy, without long-term current use of insulin  -     Miscellaneous DME  -     metFORMIN (GLUCOPHAGE) 500 MG tablet; Take 1 tablet by mouth 2 (Two) Times a Day With Meals.  -     glucose blood test strip; Use as instructed BID. One touch ultra to match patient's meter.  -     gabapentin (NEURONTIN) 300 MG capsule; Take 1 capsule by mouth 3 (Three) Times a Day.  We will continue Metformin alone at this time. I suspect he will likely need additional agents in the near future, but this was only recently started, so we will monitor clinically and plan for A1c at his next visit. We will also initiate therapy with Gabapentin for his neuropathy. Foot exam performed as above and Rx  given for diabetic shoes. I recommended eval by Podiatry, but he deferred at this time. On therapy with ACE. Not on statin as his HF is nonischemic. We will plan on lipid panel at future appt to guide therapy per ACC/AHA guidelines. Declines pneumococcal vaccination.    Atrial Fibrillation  Rate controlled on Coreg and Digoxin. Anticoagulated on Xarelto.        An After Visit Summary was printed and given to the patient at discharge.  Return in about 3 months (around 5/14/2017) for Recheck.         Leo Hernandez D.O. 2/14/2017

## 2017-02-15 ENCOUNTER — OFFICE VISIT (OUTPATIENT)
Dept: CARDIOLOGY | Facility: CLINIC | Age: 52
End: 2017-02-15

## 2017-02-15 VITALS
HEART RATE: 81 BPM | BODY MASS INDEX: 44.1 KG/M2 | SYSTOLIC BLOOD PRESSURE: 120 MMHG | HEIGHT: 71 IN | WEIGHT: 315 LBS | DIASTOLIC BLOOD PRESSURE: 70 MMHG

## 2017-02-15 DIAGNOSIS — I42.8 NONISCHEMIC CARDIOMYOPATHY (HCC): ICD-10-CM

## 2017-02-15 DIAGNOSIS — I50.42 CHRONIC COMBINED SYSTOLIC AND DIASTOLIC CONGESTIVE HEART FAILURE (HCC): Primary | ICD-10-CM

## 2017-02-15 DIAGNOSIS — Z72.0 TOBACCO ABUSE: ICD-10-CM

## 2017-02-15 DIAGNOSIS — E78.2 MIXED HYPERLIPIDEMIA: ICD-10-CM

## 2017-02-15 DIAGNOSIS — Z79.01 CHRONIC ANTICOAGULATION: ICD-10-CM

## 2017-02-15 DIAGNOSIS — G47.33 OBSTRUCTIVE SLEEP APNEA SYNDROME: ICD-10-CM

## 2017-02-15 DIAGNOSIS — I48.0 PAROXYSMAL ATRIAL FIBRILLATION (HCC): ICD-10-CM

## 2017-02-15 DIAGNOSIS — R31.29 MICROSCOPIC HEMATURIA: ICD-10-CM

## 2017-02-15 DIAGNOSIS — J44.9 CHRONIC OBSTRUCTIVE PULMONARY DISEASE, UNSPECIFIED COPD TYPE (HCC): ICD-10-CM

## 2017-02-15 DIAGNOSIS — R00.1 BRADYCARDIA: ICD-10-CM

## 2017-02-15 PROBLEM — I48.20 CHRONIC A-FIB (HCC): Status: ACTIVE | Noted: 2017-02-15

## 2017-02-15 PROCEDURE — 93000 ELECTROCARDIOGRAM COMPLETE: CPT | Performed by: INTERNAL MEDICINE

## 2017-02-15 PROCEDURE — 99214 OFFICE O/P EST MOD 30 MIN: CPT | Performed by: INTERNAL MEDICINE

## 2017-02-15 NOTE — PROGRESS NOTES
Donte Bishop  [id number]  1965  51 y.o.  male    Referring Provider: Leo Hernandez DO    Reason for Follow-up Visit: Chronic AF, CHF  Markedly better since last discharge from hospital  Overall doing well  Denies any chest pain  No excessive shortness of breath  Compliant with medications    History of present illness:  Donte Bishop is a 51 y.o. yo male with history of chronic AF who presents today for   Chief Complaint   Patient presents with   • Atrial Fibrillation     2wk d/c   .    History  Past Medical History   Diagnosis Date   • Alcohol abuse, in remission      Quit 12/2016   • Anemia    • Arthritis    • Atrial fibrillation    • Cardiomegaly    • CHF (NYHA class I, ACC/AHA stage B)    • Chronic anticoagulation      Xarelto    • COPD (chronic obstructive pulmonary disease)    • DVT (deep venous thrombosis)    • New onset type 2 diabetes mellitus 2/3/2017   • Obesity    • S/P cardiac cath    • Severe left ventricular systolic dysfunction    • Sleep apnea    • Tobacco abuse      1/2 PPD    ,   Past Surgical History   Procedure Laterality Date   • Eye surgery     • Fracture surgery     • Fracture surgery Left      left arm. has screws  and pins   • Cardiac catheterization  2014     Nonischemic Cardiomyopathy    ,   Family History   Problem Relation Age of Onset   • Heart disease Mother    • Heart disease Father    • Heart disease Maternal Grandmother    • Heart disease Maternal Grandfather    • Heart disease Paternal Grandmother    • Heart disease Paternal Grandfather    ,   Social History   Substance Use Topics   • Smoking status: Current Every Day Smoker     Packs/day: 0.50     Types: Cigarettes   • Smokeless tobacco: Never Used   • Alcohol use No      Comment: was occassional..none for two months   ,     Medications  Current Outpatient Prescriptions   Medication Sig Dispense Refill   • acetaZOLAMIDE (DIAMOX) 500 MG capsule Take 250 mg by mouth Daily.     • budesonide-formoterol (SYMBICORT)  80-4.5 MCG/ACT inhaler Inhale 2 puffs 2 (Two) Times a Day. 10.2 g 11   • bumetanide (BUMEX) 1 MG tablet Take 1 tablet by mouth Daily. 30 tablet 5   • carvedilol (COREG) 3.125 MG tablet Take 1 tablet by mouth 2 (Two) Times a Day With Meals. 60 tablet 0   • colchicine 0.6 MG tablet Take 0.6 mg by mouth daily.     • digoxin (LANOXIN) 125 MCG tablet Take 2 tablets by mouth Daily. 60 tablet 5   • gabapentin (NEURONTIN) 300 MG capsule Take 1 capsule by mouth 3 (Three) Times a Day. 90 capsule 3   • glucose blood test strip Use as instructed BID. One touch ultra to match patient's meter. 100 each 12   • lisinopril (PRINIVIL,ZESTRIL) 5 MG tablet Take 1 tablet by mouth Daily. 30 tablet 5   • magnesium oxide (MAGOX) 400 (241.3 MG) MG tablet tablet Take 1 tablet by mouth Daily. 30 each 5   • metFORMIN (GLUCOPHAGE) 500 MG tablet Take 1 tablet by mouth 2 (Two) Times a Day With Meals. 60 tablet 5   • potassium chloride (MICRO-K) 10 MEQ CR capsule Take 2 capsules by mouth Daily. 60 capsule 0   • rivaroxaban (XARELTO) 20 MG tablet Take 20 mg by mouth daily.     • spironolactone (ALDACTONE) 50 MG tablet Take 1 tablet by mouth Daily. 30 tablet 5   • Tiotropium Bromide Monohydrate 1.25 MCG/ACT aerosol solution Inhale 2 puffs Daily. 4 g 11     No current facility-administered medications for this visit.        Allergies:  Review of patient's allergies indicates no known allergies.    Review of Systems  Review of Systems   Constitution: Positive for weakness and weight loss.   HENT: Negative.    Eyes: Negative.    Cardiovascular: Positive for dyspnea on exertion. Negative for chest pain, claudication, cyanosis, irregular heartbeat, leg swelling, near-syncope, orthopnea, palpitations, paroxysmal nocturnal dyspnea and syncope.   Respiratory: Negative.    Endocrine: Negative.    Hematologic/Lymphatic: Negative.    Skin: Negative.    Gastrointestinal: Negative for anorexia.   Genitourinary: Negative.    Psychiatric/Behavioral: Negative.   "      Objective     Physical Exam:  Visit Vitals   • /70   • Pulse 81   • Ht 71\" (180.3 cm)   • Wt (!) 368 lb (167 kg)   • BMI 51.33 kg/m2     Physical Exam   Constitutional: He appears well-developed.   HENT:   Head: Normocephalic.   Neck: Normal carotid pulses and no JVD present. No tracheal tenderness present. Carotid bruit is not present. No tracheal deviation and no edema present.   Cardiovascular: Normal pulses.  An irregularly irregular rhythm present.   Murmur heard.   Systolic murmur is present with a grade of 2/6   Pulmonary/Chest: Effort normal. No stridor.   Abdominal: Soft.   Neurological: He is alert. He has normal strength. No cranial nerve deficit or sensory deficit.   Skin: Skin is warm.   Psychiatric: He has a normal mood and affect. His speech is normal and behavior is normal.       Results Review:       ECG 12 Lead  Date/Time: 2/15/2017 9:20 AM  Performed by: ANNA SHERIFF  Authorized by: ANNA SHERIFF   Comparison: compared with previous ECG from 2/2/2017  Comparison to previous ECG: VENTRICULAR RATE INCREASED FROM 42 TO 67 BPM  Rhythm: atrial fibrillation  Rate: normal  QRS axis: normal              Assessment/Plan   Patient Active Problem List   Diagnosis   • COPD (chronic obstructive pulmonary disease)   • Chronic combined systolic and diastolic congestive heart failure   • Mixed hyperlipidemia   • Obstructive sleep apnea syndrome   • Morbid obesity   • Chronic anticoagulation   • Tobacco abuse   • Megaloblastic anemia   • History of alcohol abuse   • Microscopic hematuria   • Bradycardia   • Nonischemic cardiomyopathy   • Type 2 diabetes mellitus with diabetic polyneuropathy, without long-term current use of insulin   • Chronic a-fib       No palpitations. No significant pedal edema. Compliant with medications and diet. Latest labs and medications reviewed.    Plan:  Much better  Close follow up with you as scheduled.  Intensive factor modifications.  See order list.    Counseled " regarding disease appropriate diet, fluid, caffeine, stimulants and sodium intake as well as importance of compliance to diet, exercise and regular follow up.  Avoid NSAIDS and COX2 inhibitors. Use Acetaminophen PRN.    Return Keep F/U appointment in August.